# Patient Record
Sex: MALE | Race: OTHER | HISPANIC OR LATINO | Employment: FULL TIME | URBAN - METROPOLITAN AREA
[De-identification: names, ages, dates, MRNs, and addresses within clinical notes are randomized per-mention and may not be internally consistent; named-entity substitution may affect disease eponyms.]

---

## 2017-01-23 ENCOUNTER — ALLSCRIPTS OFFICE VISIT (OUTPATIENT)
Dept: OTHER | Facility: OTHER | Age: 52
End: 2017-01-23

## 2017-07-14 ENCOUNTER — ALLSCRIPTS OFFICE VISIT (OUTPATIENT)
Dept: OTHER | Facility: OTHER | Age: 52
End: 2017-07-14

## 2017-07-14 DIAGNOSIS — Z12.5 ENCOUNTER FOR SCREENING FOR MALIGNANT NEOPLASM OF PROSTATE: ICD-10-CM

## 2017-07-14 DIAGNOSIS — R73.09 OTHER ABNORMAL GLUCOSE: ICD-10-CM

## 2017-07-14 DIAGNOSIS — L82.1 OTHER SEBORRHEIC KERATOSIS: ICD-10-CM

## 2017-07-14 DIAGNOSIS — R06.83 SNORING: ICD-10-CM

## 2017-07-14 DIAGNOSIS — E78.5 HYPERLIPIDEMIA: ICD-10-CM

## 2017-07-14 DIAGNOSIS — E03.9 HYPOTHYROIDISM: ICD-10-CM

## 2017-07-14 DIAGNOSIS — Z00.00 ENCOUNTER FOR GENERAL ADULT MEDICAL EXAMINATION WITHOUT ABNORMAL FINDINGS: ICD-10-CM

## 2017-07-14 DIAGNOSIS — Z12.11 ENCOUNTER FOR SCREENING FOR MALIGNANT NEOPLASM OF COLON: ICD-10-CM

## 2017-07-25 ENCOUNTER — TRANSCRIBE ORDERS (OUTPATIENT)
Dept: ADMINISTRATIVE | Age: 52
End: 2017-07-25

## 2017-07-25 ENCOUNTER — APPOINTMENT (OUTPATIENT)
Dept: LAB | Age: 52
End: 2017-07-25
Payer: COMMERCIAL

## 2017-07-25 DIAGNOSIS — R06.83 SNORING: ICD-10-CM

## 2017-07-25 DIAGNOSIS — E78.5 HYPERLIPIDEMIA: ICD-10-CM

## 2017-07-25 DIAGNOSIS — R73.09 OTHER ABNORMAL GLUCOSE: ICD-10-CM

## 2017-07-25 DIAGNOSIS — Z12.5 ENCOUNTER FOR SCREENING FOR MALIGNANT NEOPLASM OF PROSTATE: ICD-10-CM

## 2017-07-25 DIAGNOSIS — E03.9 HYPOTHYROIDISM: ICD-10-CM

## 2017-07-25 DIAGNOSIS — Z12.11 ENCOUNTER FOR SCREENING FOR MALIGNANT NEOPLASM OF COLON: ICD-10-CM

## 2017-07-25 DIAGNOSIS — Z00.00 ENCOUNTER FOR GENERAL ADULT MEDICAL EXAMINATION WITHOUT ABNORMAL FINDINGS: ICD-10-CM

## 2017-07-25 DIAGNOSIS — L82.1 OTHER SEBORRHEIC KERATOSIS: ICD-10-CM

## 2017-07-25 LAB
ALBUMIN SERPL BCP-MCNC: 3.9 G/DL (ref 3.5–5)
ALP SERPL-CCNC: 60 U/L (ref 46–116)
ALT SERPL W P-5'-P-CCNC: 79 U/L (ref 12–78)
ANION GAP SERPL CALCULATED.3IONS-SCNC: 5 MMOL/L (ref 4–13)
AST SERPL W P-5'-P-CCNC: 102 U/L (ref 5–45)
BASOPHILS # BLD AUTO: 0.02 THOUSANDS/ΜL (ref 0–0.1)
BASOPHILS NFR BLD AUTO: 0 % (ref 0–1)
BILIRUB SERPL-MCNC: 0.51 MG/DL (ref 0.2–1)
BUN SERPL-MCNC: 20 MG/DL (ref 5–25)
CALCIUM SERPL-MCNC: 9.2 MG/DL (ref 8.3–10.1)
CHLORIDE SERPL-SCNC: 104 MMOL/L (ref 100–108)
CHOLEST SERPL-MCNC: 210 MG/DL (ref 50–200)
CO2 SERPL-SCNC: 26 MMOL/L (ref 21–32)
CREAT SERPL-MCNC: 1.05 MG/DL (ref 0.6–1.3)
EOSINOPHIL # BLD AUTO: 0.09 THOUSAND/ΜL (ref 0–0.61)
EOSINOPHIL NFR BLD AUTO: 2 % (ref 0–6)
ERYTHROCYTE [DISTWIDTH] IN BLOOD BY AUTOMATED COUNT: 13.9 % (ref 11.6–15.1)
EST. AVERAGE GLUCOSE BLD GHB EST-MCNC: 123 MG/DL
GFR SERPL CREATININE-BSD FRML MDRD: 81 ML/MIN/1.73SQ M
GLUCOSE P FAST SERPL-MCNC: 121 MG/DL (ref 65–99)
HBA1C MFR BLD: 5.9 % (ref 4.2–6.3)
HCT VFR BLD AUTO: 44 % (ref 36.5–49.3)
HDLC SERPL-MCNC: 34 MG/DL (ref 40–60)
HGB BLD-MCNC: 15.3 G/DL (ref 12–17)
LDLC SERPL CALC-MCNC: 138 MG/DL (ref 0–100)
LYMPHOCYTES # BLD AUTO: 1.6 THOUSANDS/ΜL (ref 0.6–4.47)
LYMPHOCYTES NFR BLD AUTO: 33 % (ref 14–44)
MCH RBC QN AUTO: 30.8 PG (ref 26.8–34.3)
MCHC RBC AUTO-ENTMCNC: 34.8 G/DL (ref 31.4–37.4)
MCV RBC AUTO: 89 FL (ref 82–98)
MONOCYTES # BLD AUTO: 0.58 THOUSAND/ΜL (ref 0.17–1.22)
MONOCYTES NFR BLD AUTO: 12 % (ref 4–12)
NEUTROPHILS # BLD AUTO: 2.58 THOUSANDS/ΜL (ref 1.85–7.62)
NEUTS SEG NFR BLD AUTO: 53 % (ref 43–75)
NRBC BLD AUTO-RTO: 0 /100 WBCS
PLATELET # BLD AUTO: 294 THOUSANDS/UL (ref 149–390)
PMV BLD AUTO: 8.8 FL (ref 8.9–12.7)
POTASSIUM SERPL-SCNC: 4.8 MMOL/L (ref 3.5–5.3)
PROT SERPL-MCNC: 8.1 G/DL (ref 6.4–8.2)
RBC # BLD AUTO: 4.97 MILLION/UL (ref 3.88–5.62)
SODIUM SERPL-SCNC: 135 MMOL/L (ref 136–145)
TRIGL SERPL-MCNC: 191 MG/DL
WBC # BLD AUTO: 4.88 THOUSAND/UL (ref 4.31–10.16)

## 2017-07-25 PROCEDURE — 80061 LIPID PANEL: CPT

## 2017-07-25 PROCEDURE — 85025 COMPLETE CBC W/AUTO DIFF WBC: CPT

## 2017-07-25 PROCEDURE — 83036 HEMOGLOBIN GLYCOSYLATED A1C: CPT

## 2017-07-25 PROCEDURE — 80053 COMPREHEN METABOLIC PANEL: CPT

## 2017-07-25 PROCEDURE — 36415 COLL VENOUS BLD VENIPUNCTURE: CPT

## 2017-07-30 ENCOUNTER — GENERIC CONVERSION - ENCOUNTER (OUTPATIENT)
Dept: OTHER | Facility: OTHER | Age: 52
End: 2017-07-30

## 2017-08-31 ENCOUNTER — ALLSCRIPTS OFFICE VISIT (OUTPATIENT)
Dept: OTHER | Facility: OTHER | Age: 52
End: 2017-08-31

## 2017-08-31 DIAGNOSIS — L82.1 OTHER SEBORRHEIC KERATOSIS: ICD-10-CM

## 2017-08-31 PROCEDURE — 88305 TISSUE EXAM BY PATHOLOGIST: CPT | Performed by: FAMILY MEDICINE

## 2017-09-01 ENCOUNTER — LAB REQUISITION (OUTPATIENT)
Dept: LAB | Facility: HOSPITAL | Age: 52
End: 2017-09-01
Payer: COMMERCIAL

## 2017-09-01 DIAGNOSIS — L82.1 OTHER SEBORRHEIC KERATOSIS: ICD-10-CM

## 2017-09-10 ENCOUNTER — GENERIC CONVERSION - ENCOUNTER (OUTPATIENT)
Dept: OTHER | Facility: OTHER | Age: 52
End: 2017-09-10

## 2017-11-29 ENCOUNTER — TRANSCRIBE ORDERS (OUTPATIENT)
Dept: ADMINISTRATIVE | Facility: HOSPITAL | Age: 52
End: 2017-11-29

## 2017-11-29 DIAGNOSIS — G47.30 SLEEP APNEA IN ADULT: Primary | ICD-10-CM

## 2018-01-10 NOTE — MISCELLANEOUS
Message  Return to work or school:  04/25/2016   He is able to return to work on  04/26/2016       Dr Mercedes Goodman DO/ JIA  Signatures   Electronically signed by : Clara Clayton DO;  Apr 25 2016 10:38AM EST                       (Author)

## 2018-01-10 NOTE — PROGRESS NOTES
Chief Complaint  Patient presents to office for administration of an influenza vaccine  Active Problems    1  Anxiety disorder (300 00) (F41 9)   2  Colon cancer screening (V76 51) (Z12 11)   3  GERD (gastroesophageal reflux disease) (530 81) (K21 9)   4  Hyperlipidemia (272 4) (E78 5)   5  Hypothyroidism (244 9) (E03 9)   6  Insomnia (780 52) (G47 00)   7  Need for prophylactic vaccination and inoculation against influenza (V04 81) (Z23)   8  Need for vaccination for DTP (V06 1) (Z23)   9  Prediabetes (790 29) (R73 09)   10  Prostate cancer screening (V76 44) (Z12 5)   11  Snoring (786 09) (R06 83)   12  Umbilical hernia, recurrence not specified   13  Well adult on routine health check (V70 0) (Z00 00)    Current Meds   1  ALPRAZolam 0 25 MG Oral Tablet; 1 5 TABS QD; Therapy: 22LLH2294 to (Evaluate:75Atg7733); Last Rx:19Jan2017 Ordered   2  Aspirin 81 MG TABS; TAKE 1 TABLET DAILY; Therapy: 24DSM3067 to (Evaluate:31Owb2756) Recorded   3  Centrum Silver Oral Tablet; TAKE 1 TABLET DAILY; Therapy: 58GKW2360 to Recorded   4  Levothyroxine Sodium 50 MCG Oral Tablet; take 1 tablet by mouth once daily; Therapy: 89LPZ0751 to ((05) 2020-1054)  Requested for: 10AST7755; Last   Rx:03Nov2016 Ordered   5  Omeprazole 40 MG Oral Capsule Delayed Release; TAKE ONE CAPSULE BY MOUTH   ONCE DAILY; Therapy: 16Xck9862 to (Last Rx:19Jan2017)  Requested for: 34BSR2029 Ordered    Allergies    1  NexIUM 24HR CPDR    2  No Known Environmental Allergies   3   No Known Food Allergies    Plan  Need for prophylactic vaccination and inoculation against influenza    · Fluzone Quadrivalent 0 5 ML Intramuscular Suspension Prefilled Syringe    Future Appointments    Date/Time Provider Specialty Site   05/01/2017 10:00 AM Sarah Saldaña DO [facility] [address]   Electronically signed by : Janna Pierce DO; Jan 24 2017  7:43AM EST                       (Author)

## 2018-01-12 NOTE — PROGRESS NOTES
Assessment    1  Encounter for preventive health examination (V70 0) (Z00 00)   2  Colon cancer screening (V76 51) (Z12 11)   3  Prostate cancer screening (V76 44) (Z12 5)   4  Hyperlipidemia (272 4) (E78 5)   5  Hypothyroidism (244 9) (E03 9)   6  Abnormal glucose (790 29) (R73 09)   7  Snoring (786 09) (R06 83)   8  Seborrheic keratosis (702 19) (L82 1)    Plan  Abnormal glucose, Colon cancer screening, Health Maintenance, Hyperlipidemia,  Hypothyroidism, Prostate cancer screening, Seborrheic keratosis, Snoring    · (1) CBC/PLT/DIFF; Status:Active; Requested for:73Ncr0554;    · (1) COMPREHENSIVE METABOLIC PANEL; Status:Active; Requested for:27Wtu5057;    · (1) HEMOGLOBIN A1C; Status:Active; Requested for:48Dbj3439;    · (1) LIPID PANEL, FASTING; Status:Active; Requested for:11Pgy4340;    · COLONOSCOPY; Status:Active; Requested for:62Efd5629;    · 1 - Tin Horan MD (Gastroenterology) Co-Management  * 46 yo male with need of colon  cancer screening  Bladimir Jean-Baptiste DO  Status: Active  Requested for: [address] Summary provided  : Yes   · 2 - Teodora Lancaster MD  (Sleep Specialist) Co-Management  *  46 yo male with snoring  and daytime somnolence  Thanks  Bladimir Jean-Baptiste DO  Status: Hold For - Scheduling   Requested for: [address] Summary provided  : Yes   · Follow-up visit in 1 month Evaluation and Treatment  Follow-up  Status: Hold For -  Scheduling  Requested for: 58GEB9553  Hypothyroidism    · (1) TSH WITH FT4 REFLEX; Status:Active; Requested for:16Dwn9410;     Discussion/Summary  Impression: health maintenance visit, healthy adult male  Currently, he eats a healthy diet and has an adequate exercise regimen  Prostate cancer screening: the risks and benefits of prostate cancer screening were discussed, prostate cancer screening is current and PSA was ordered   Testicular cancer screening: the risks and benefits of testicular cancer screening were discussed, self testicular exam technique was taught, monthly self testicular exam was advised, testicular cancer screening is current and clinical testicular exam was done today  Colorectal cancer screening: the risks and benefits of colorectal cancer screening were discussed, colonoscopy has been ordered and Pt referred to Dr Ciara Rosario of GI  Screening lab work includes glucose, lipid profile and A1c, CBC, PSA, and TSH  The risks and benefits of immunizations were discussed and immunizations are up to date  He was advised to be evaluated by Pt was referred to Dr Alejandra Parrish of Sleep Medicine - likely will need sleep study  Advice and education were given regarding nutrition, aerobic exercise, tobacco cessation and We discussed the spot on the left anterior tibial region - likely a benign SK; given though that he just recently noticed it, and that it is not round, biopsy is indicated  Patient discussion: discussed with the patient, Valeriy Roche is healthy on exam  He is to f/u in 1 month for punch biopsy of the spot on the left leg, or sooner PRN  Chief Complaint  PT is being seen today for a  visit  PT would also like to have a script for sleep study, colonoscopy and bw for [employer]  History of Present Illness  HM, Adult Male: The patient is being seen for a health maintenance evaluation  The last health maintenance visit was 1 year(s) ago  General Health: The patient's health since the last visit is described as good   Needs coloscopy, BW, and concerns over snoring  Vaelriy Roche is prior [employer], with also over 36 year practicing [employer]  He has regular dental visits  He denies vision problems  He denies hearing loss  Immunizations status: up to date  Lifestyle:  He consumes a diverse and healthy diet  He does not have any weight concerns  He exercises regularly  Exercise includes running/jogging and strength training  He uses tobacco  The patient has never smoked cigarettes, is a current cigar smoker and Occ cigar  He consumes alcohol   He reports occasional alcohol use  He denies drug use  Reproductive health:  the patient is sexually active  He denies erectile dysfunction  Screening: cancer screening reviewed and current  metabolic screening reviewed and current  risk screening reviewed and current  Additional History:  +Snoring - significant; +/-Daytime somnolence  No CP/SOB/GIRALDO  No abd pain or rectal bleeding  Urine flow is fine  No ED  No anxiety or depression  HPI: As above  Review of Systems    Constitutional: as noted in HPI  Cardiovascular: as noted in HPI  Respiratory: as noted in HPI  Gastrointestinal: as noted in HPI  Genitourinary: as noted in HPI  Psychiatric: as noted in HPI  Active Problems    1  Abnormal glucose (790 29) (R73 09)   2  Anxiety disorder (300 00) (F41 9)   3  Colon cancer screening (V76 51) (Z12 11)   4  GERD (gastroesophageal reflux disease) (530 81) (K21 9)   5  Hyperlipidemia (272 4) (E78 5)   6  Hypothyroidism (244 9) (E03 9)   7  Insomnia (780 52) (G47 00)   8  Need for prophylactic vaccination and inoculation against influenza (V04 81) (Z23)   9  Need for vaccination for DTP (V06 1) (Z23)   10  Prostate cancer screening (V76 44) (Z12 5)   11  Snoring (786 09) (R06 83)   12  Umbilical hernia, recurrence not specified   13   Well adult on routine health check (V70 0) (Z00 00)    Past Medical History    · Need for prophylactic vaccination and inoculation against influenza (V04 81) (Z23)   · Need for vaccination for DTP (V06 1) (Z23)   · History of Otalgia, unspecified laterality (388 70) (H92 09)   · History of Pulmonary Embolism (V12 51)    Surgical History    · History of Tonsillectomy With Adenoidectomy    Family History  Mother    · Family history of Diabetes Mellitus (V18 0)   · Family history of hypothyroidism (V18 19) (Z83 49)   · Family history of Glaucoma   · Family history of Hypertension (V17 49)  Sister    · Family history of Alcohol Abuse   · Family history of Drug Dependence   · Family history of hypothyroidism (V18 19) (Z83 49)  Maternal Grandmother    · Family history of Lung Cancer (V16 1)  Family History    · Family history of Family Health Status 5  Children Living    Social History    · Being A Social Drinker   · OCC   · Caffeine Use   · 1 CUP PER DAY   · Former smoker (V15 82) (G21 027)   · QUIT 2007   · Marital History - Currently    · 3 children ages 11-16   · Occupation:   ·  IN A [employer]   · Recreational Activities [employer]ial Arts    Current Meds   1  ALPRAZolam 0 25 MG Oral Tablet; 1 5 TABS QD; Therapy: 87IBI4256 to (Evaluate:10Llt5985); Last Rx:19Jan2017 Ordered   2  Aspirin 81 MG TABS; TAKE 1 TABLET DAILY; Therapy: 52SHY2932 to (Evaluate:76Wbx5256) Recorded   3  Centrum Silver Oral Tablet; TAKE 1 TABLET DAILY; Therapy: 55TAR6730 to Recorded   4  Levothyroxine Sodium 50 MCG Oral Tablet; take 1 tablet by mouth once daily; Therapy: 56MDV8803 to ()  Requested for: 08JOZ8052; Last   Rx:03Nov2016 Ordered   5  Omeprazole 40 MG Oral Capsule Delayed Release; TAKE ONE CAPSULE BY MOUTH   ONCE DAILY; Therapy: 19Gib4518 to (Last Rx:19Jan2017)  Requested for: 24OFT8459 Ordered    Allergies    1  NexIUM 24HR CPDR    2  No Known Environmental Allergies   3  No Known Food Allergies    Vitals   Recorded: 21Ybl9315 08:41AM   Heart Rate 60   Respiration 16   Systolic 580   Diastolic 82   Height 5 ft 10 in   Weight 233 lb 4 oz   BMI Calculated 33 47   BSA Calculated 2 23     Physical Exam    Constitutional   General appearance: No acute distress, well appearing and well nourished  NAD; VSS; pleasant  Head and Face   Head and face: Normal     Eyes   Conjunctiva and lids: No erythema, swelling or discharge  Ears, Nose, Mouth, and Throat   External inspection of ears and nose: Normal     Otoscopic examination: Tympanic membranes translucent with normal light reflex  Canals patent without erythema      Hearing: Normal     Lips, teeth, and gums: Normal, good dentition  Oropharynx: Normal with no erythema, edema, exudate or lesions  Neck   Neck: Supple, symmetric, trachea midline, no masses  Pulmonary   Respiratory effort: No increased work of breathing or signs of respiratory distress  Auscultation of lungs: Clear to auscultation  Cardiovascular   Auscultation of heart: Normal rate and rhythm, normal S1 and S2, no murmurs  Examination of extremities for edema and/or varicosities: Normal     Abdomen   Abdomen: Non-tender, no masses  Liver and spleen: No hepatomegaly or splenomegaly  Examination for hernias: No hernias appreciated  Anus, perineum, and rectum: Normal sphincter tone, no masses, no prolapse  Stool sample for occult blood: Negative  The stool showed no gross blood  Genitourinary   Scrotal contents: Normal testes, no masses  Penis: Normal, no lesions  Digital rectal exam of prostate: Normal size, no masses  Lymphatic   Palpation of lymph nodes in neck: No lymphadenopathy  Musculoskeletal   Gait and station: Normal     Skin   Skin and subcutaneous tissue: Normal without rashes or lesions  Pt with a small - 0 75cm - raised slightly, dry, heterogeneously pigmented, almost triangular lesion to the mid-left anterior tibial region  Has more of a "stuck on" appearance - most c/w a benign seborrheic keratosis  Psychiatric   Judgment and insight: Normal     Orientation to person, place and time: Normal     Recent and remote memory: Intact  Mood and affect: Normal        Results/Data  PHQ-2 Adult Depression Screening 92GFT3753 08:44AM User, s     Test Name Result Flag Reference   PHQ-2 Adult Depression Score 0     Over the last two weeks, how often have you been bothered by any of the following problems?   Little interest or pleasure in doing things: Not at all - 0  Feeling down, depressed, or hopeless: Not at all - 0   PHQ-2 Adult Depression Screening Negative         Signatures Electronically signed by : Lauren Honeycutt DO; Jul 14 2017  9:24AM EST                       (Author)

## 2018-01-12 NOTE — RESULT NOTES
Verified Results  (1) TISSUE EXAM 89TNA9863 04:02PM Natividad Garcia     Test Name Result Flag Reference   LAB AP CASE REPORT (Report)     Surgical Pathology Report             Case: [id number]                   Authorizing Provider: Lala Martinez DO    Collected:      08/31/2017           Pathologist:      Isis Lo MD        Received:      09/02/2017 0913        Specimen:  Skin, Other, Left mid-anterior tibial region   LAB AP FINAL DIAGNOSIS (Report)     A  Skin, Left mid-anterior tibial region, punch biopsy:  - Consistent with pigmented keratosis; see note  Note: Sections show hyperpigmented basal keratinocytes  Interpretation performed at [facility], [address]        Electronically signed by Isis Lo MD on 9/8/2017 at 4:02 PM   LAB AP SURGICAL ADDITIONAL INFORMATION (Report)     All controls performed with the immunohistochemical stains reported above   reacted appropriately  These tests were developed and their performance   characteristics determined by [facility] or   [facility]  They may not be cleared or approved by the [place]  Food and Drug Administration  The FDA has determined that such clearance   or approval is not necessary  These tests are used for clinical purposes  They should not be regarded as investigational or for research  This   laboratory has been approved by [facility], designated as a high-complexity   laboratory and is qualified to perform these tests  LAB AP GROSS DESCRIPTION (Report)     A  The specimen is received in formalin, labeled with the patient's name   and hospital number, and is designated skin punch biopsy left mid   anterior tibial lesion  The specimen consists of a tan portion of skin   measuring 0 2 cm in diameter, excised to a depth of 0 1 cm  The skin   surface appears somewhat keratotic  The margin of resection is inked   green  The skin surface is inked red   Entirely submitted  One cassette  Note: The estimated total formalin fixation time based upon information   provided by the submitting clinician and the standard processing schedule   is over 72 hours       MAC

## 2018-01-13 VITALS
HEART RATE: 60 BPM | BODY MASS INDEX: 34.07 KG/M2 | RESPIRATION RATE: 16 BRPM | DIASTOLIC BLOOD PRESSURE: 86 MMHG | WEIGHT: 238 LBS | SYSTOLIC BLOOD PRESSURE: 120 MMHG | HEIGHT: 70 IN

## 2018-01-13 NOTE — RESULT NOTES
Verified Results  (1) CBC/PLT/DIFF 63BZK1280 06:40AM Asad Garcia     Test Name Result Flag Reference   WBC COUNT 4 88 Thousand/uL  4 31-10 16   RBC COUNT 4 97 Million/uL  3 88-5 62   HEMOGLOBIN 15 3 g/dL  12 0-17 0   HEMATOCRIT 44 0 %  36 5-49 3   MCV 89 fL  82-98   MCH 30 8 pg  26 8-34 3   MCHC 34 8 g/dL  31 4-37 4   RDW 13 9 %  11 6-15 1   MPV 8 8 fL L 8 9-12 7   PLATELET COUNT 134 Thousands/uL  149-390   nRBC AUTOMATED 0 /100 WBCs     NEUTROPHILS RELATIVE PERCENT 53 %  43-75   LYMPHOCYTES RELATIVE PERCENT 33 %  14-44   MONOCYTES RELATIVE PERCENT 12 %  4-12   EOSINOPHILS RELATIVE PERCENT 2 %  0-6   BASOPHILS RELATIVE PERCENT 0 %  0-1   NEUTROPHILS ABSOLUTE COUNT 2 58 Thousands/? ??L  1 85-7 62   LYMPHOCYTES ABSOLUTE COUNT 1 60 Thousands/? ??L  0 60-4 47   MONOCYTES ABSOLUTE COUNT 0 58 Thousand/? ??L  0 17-1 22   EOSINOPHILS ABSOLUTE COUNT 0 09 Thousand/? ??L  0 00-0 61   BASOPHILS ABSOLUTE COUNT 0 02 Thousands/? ??L  0 00-0 10     (1) COMPREHENSIVE METABOLIC PANEL 26EDK6081 24:33LS Asad Garcia     Test Name Result Flag Reference   SODIUM 135 mmol/L L 136-145   POTASSIUM 4 8 mmol/L  3 5-5 3   CHLORIDE 104 mmol/L  100-108   CARBON DIOXIDE 26 mmol/L  21-32   ANION GAP (CALC) 5 mmol/L  4-13   BLOOD UREA NITROGEN 20 mg/dL  5-25   CREATININE 1 05 mg/dL  0 60-1 30   Standardized to IDMS reference method   CALCIUM 9 2 mg/dL  8 3-10 1   BILI, TOTAL 0 51 mg/dL  0 20-1 00   ALK PHOSPHATAS 60 U/L     ALT (SGPT) 79 U/L H 12-78   AST(SGOT) 102 U/L H 5-45   ALBUMIN 3 9 g/dL  3 5-5 0   TOTAL PROTEIN 8 1 g/dL  6 4-8 2   eGFR 81 ml/min/1 73sq m     National Kidney Disease Education Program recommendations are as follows:  GFR calculation is accurate only with a steady state creatinine  Chronic Kidney disease less than 60 ml/min/1 73 sq  meters  Kidney failure less than 15 ml/min/1 73 sq  meters     GLUCOSE FASTING 121 mg/dL H 65-99     (1) HEMOGLOBIN A1C 30DNZ2076 06:40AM Asad Garcia     Test Name Result Flag Reference   HEMOGLOBIN A1C 5 9 %  4 2-6 3   EST  AVG  GLUCOSE 123 mg/dl       (1) LIPID PANEL, FASTING 82MTE0626 06:40AM Asad Garcia     Test Name Result Flag Reference   CHOLESTEROL 210 mg/dL H    HDL,DIRECT 34 mg/dL L 40-60   Specimen collection should occur prior to Metamizole administration due to the potential for falsely depressed results  LDL CHOLESTEROL CALCULATED 138 mg/dL H 0-100   Triglyceride:         Normal              <150 mg/dl       Borderline High    150-199 mg/dl       High               200-499 mg/dl       Very High          >499 mg/dl  Cholesterol:         Desirable        <200 mg/dl      Borderline High  200-239 mg/dl      High             >239 mg/dl  HDL Cholesterol:        High    >59 mg/dL      Low     <41 mg/dL  LDL CALCULATED:    This screening LDL is a calculated result  It does not have the accuracy of the Direct Measured LDL in the monitoring of patients with hyperlipidemia and/or statin therapy  Direct Measure LDL (QAQ076) must be ordered separately in these patients  TRIGLYCERIDES 191 mg/dL H <=150   Specimen collection should occur prior to N-Acetylcysteine or Metamizole administration due to the potential for falsely depressed results

## 2018-01-13 NOTE — RESULT NOTES
Verified Results  (1) HEMOGLOBIN A1C 02Jun2016 07:01AM [email or web address] Order Number: [id number]  TW Order Number: [id number]     Test Name Result Flag Reference   HEMOGLOBIN A1C 6 0 %  4 2-6 3   EST  AVG  GLUCOSE 126 mg/dl       (1) LIPID PANEL, FASTING 02Jun2016 07:01AM [email or web address] Order Number: [id number]  TW Order Number: [id number] Order Number: [id number] Order Number: [id number]     Test Name Result Flag Reference   CHOLESTEROL 202 mg/dL H    HDL,DIRECT 29 mg/dL L 40-60   Specimen collection should occur prior to Metamizole administration due to the potential for falsely depressed results  LDL CHOLESTEROL CALCULATED 109 mg/dL H 0-100   Triglyceride:         Normal              <150 mg/dl       Borderline High    150-199 mg/dl       High               200-499 mg/dl       Very High          >499 mg/dl  Cholesterol:         Desirable        <200 mg/dl      Borderline High  200-239 mg/dl      High             >239 mg/dl  HDL Cholesterol:        High    >59 mg/dL      Low     <41 mg/dL  LDL CALCULATED:    This screening LDL is a calculated result  It does not have the accuracy of the Direct Measured LDL in the monitoring of patients with hyperlipidemia and/or statin therapy  Direct Measure LDL (OTH973) must be ordered separately in these patients  TRIGLYCERIDES 321 mg/dL H <=150   Specimen collection should occur prior to N-Acetylcysteine or Metamizole administration due to the potential for falsely depressed results  (1) COMPREHENSIVE METABOLIC PANEL 63RGC9926 96:03TZ [email or web address] Order Number: [id number]  TW Order Number: [id number] Order Number: [id number] Order Number: [id number]     Test Name Result Flag Reference   GLUCOSE,RANDM 108 mg/dL     If the patient is fasting, the ADA then defines impaired fasting glucose as > 100 mg/dL and diabetes as > or equal to 123 mg/dL     SODIUM 138 mmol/L  136-145   POTASSIUM 3 9 mmol/L  3 5-5 3   CHLORIDE 104 mmol/L  100-108   CARBON DIOXIDE 28 mmol/L  21-32   ANION GAP (CALC) 6 mmol/L  4-13   BLOOD UREA NITROGEN 23 mg/dL  5-25   CREATININE 0 90 mg/dL  0 60-1 30   Standardized to IDMS reference method   CALCIUM 8 3 mg/dL  8 3-10 1   BILI, TOTAL 0 50 mg/dL  0 20-1 00   ALK PHOSPHATAS 63 U/L     ALT (SGPT) 39 U/L  12-78   AST(SGOT) 15 U/L  5-45   ALBUMIN 3 8 g/dL  3 5-5 0   TOTAL PROTEIN 7 5 g/dL  6 4-8 2   eGFR Non-African American      >60 0 ml/min/1 73sq [facility] Disease Education Program recommendations are as follows:  GFR calculation is accurate only with a steady state creatinine  Chronic Kidney disease less than 60 ml/min/1 73 sq  meters  Kidney failure less than 15 ml/min/1 73 sq  meters  (1) TSH WITH FT4 REFLEX 02Jun2016 07:01AM [employer] Order Number: [id number]  TW Order Number: [id number]     Test Name Result Flag Reference   TSH 13 600 uIU/mL H 0 358-3 740   T4,FREE 0 73 ng/dL L 0 76-1 46     (1) PSA (SCREEN) (Dx V76 44 Screen for Prostate Cancer) 02Jun2016 07:01AM [employer] Order Number: [id number]  TW Order Number: [id number]     Test Name Result Flag Reference   PROSTATE SPECIFIC ANTIGEN 1 2 ng/mL  0 0-4 0       Discussion/Summary   THYROID LEVEL IS ABNORMAL   CHOLESTEROL IS HIGHER    [facility]

## 2018-01-14 VITALS
DIASTOLIC BLOOD PRESSURE: 82 MMHG | RESPIRATION RATE: 16 BRPM | SYSTOLIC BLOOD PRESSURE: 118 MMHG | WEIGHT: 233.25 LBS | HEART RATE: 60 BPM | HEIGHT: 70 IN | BODY MASS INDEX: 33.39 KG/M2

## 2018-01-15 NOTE — RESULT NOTES
Verified Results  (1) TSH WITH FT4 REFLEX 20Oct2016 07:20AM Stacy Yadira    Order Number: [id number]     Test Name Result Flag Reference   TSH 6 130 uIU/mL H 0 358-3 740   Patients undergoing fluorescein dye angiography may retain small amounts of fluorescein in the body for 48-72 hours post procedure  Samples containing fluorescein can produce falsely depressed TSH values  If the patient had this procedure,a specimen should be resubmitted post fluorescein clearance  T4,FREE 0 95 ng/dL  0 76-1 46       Plan  Hypothyroidism    · From  Levothyroxine Sodium 25 MCG Oral Tablet TAKE 1 TABLET DAILY To  Levothyroxine Sodium 50 MCG Oral Tablet take 1 tablet by mouth once daily   · (1) TSH WITH FT4 REFLEX; Status:Active;  Requested for:35Mih6913;

## 2018-01-15 NOTE — PROGRESS NOTES
Assessment    1  Well adult on routine health check (V70 0) (Z00 00)   2  Prostate cancer screening (V76 44) (Z12 5)   3  Snoring (786 09) (R06 83)   4  Colon cancer screening (V76 51) (Z12 11)    Plan  Anxiety disorder    · ALPRAZolam 0 25 MG Oral Tablet (Xanax); 1 5 TABS QD  Colon cancer screening    · COLONOSCOPY; Status:Active; Requested for:77Bot6821;    · 2 - Kalia Nguyen MD, [facility]  Colorectal Surgery, Gastroenterology Physician Referral   Consult  Status: Hold For - Scheduling  Requested for: 16Kmr0214  Care Summary provided  : Yes  Hyperlipidemia, Prediabetes, Prostate cancer screening    · (1) COMPREHENSIVE METABOLIC PANEL; Status:Active; Requested for:02Lbg0785;    · (1) HEMOGLOBIN A1C; Status:Active; Requested for:04Syi7379;    · (1) LIPID PANEL, FASTING; Status:Active; Requested for:73Jlo7172;    · (1) TSH WITH FT4 REFLEX; Status:Active; Requested for:21Zbh6632;   Prostate cancer screening    · (1) PSA (SCREEN) (Dx V76 44 Screen for Prostate Cancer); Status:Active; Requested  for:15Vti9234;   Snoring    · *Polysomnography, Sleep Study, Diagnostic; Status:Need Information - Financial  Authorization; Requested for:[id number];   there any other medical conditions or medications that would explain these      symptoms? : No  is the sleep disturbance affecting the patient's ability to function? : NO  History/Symptoms: : SNORING, WITNESSED APNEW  Study Only or Consult : Sleep Study and Consult and F/U with Sleep Specialist    Discussion/Summary  Impression: health maintenance visit  Currently, he eats a healthy diet and has an adequate exercise regimen  Prostate cancer screening: PSA was ordered  Testicular cancer screening: monthly self testicular exam was advised  Colorectal cancer screening: colonoscopy has been ordered  The immunizations are up to date  Advice and education were given regarding aerobic exercise and vitamin D supplements  Patient discussion: discussed with the patient       WIFE VIDEO TAPED SLEEPING- PERIODS WHERE BREATHING STOPS AND LOUD SNORING  WILL ORDER SLEEP STUDY  Chief Complaint  Patient here for Annual Wellness exam      History of Present Illness  HM, Adult Male: The patient is being seen for a health maintenance evaluation  General Health: The patient's health since the last visit is described as good  He has regular dental visits  He denies vision problems  He denies hearing loss  Immunizations status: up to date  Lifestyle:  He consumes a diverse and healthy diet  He does not have any weight concerns  He exercises regularly  He does not use tobacco  He denies alcohol use  He denies drug use  Screening: cancer screening reviewed and updated  Prostate cancer screening includes prostate-specific antigen testing last year  Testicular cancer screening includes monthly self testicular examinations  Colorectal cancer screening includes no previous screening  metabolic screening reviewed and updated  Metabolic screening includes lipid profile performed within the past five years, glucose screening performed last year and thyroid function test performed last year  Review of Systems    Constitutional: No fever or chills, feels well, no tiredness, no recent weight gain or weight loss  Eyes: No complaints of eye pain, no red eyes, no discharge from eyes, no itchy eyes  ENT: no complaints of earache, no hearing loss, no nosebleeds, no nasal discharge, no sore throat, no hoarseness  Cardiovascular: No complaints of slow heart rate, no fast heart rate, no chest pain, no palpitations, no leg claudication, no lower extremity  Respiratory: No complaints of shortness of breath, no wheezing, no cough, no SOB on exertion, no orthopnea or PND  Gastrointestinal: No complaints of abdominal pain, no constipation, no nausea or vomiting, no diarrhea or bloody stools     Genitourinary: No complaints of dysuria, no incontinence, no hesitancy, no nocturia, no genital lesion, no testicular pain    Musculoskeletal: No complaints of arthralgia, no myalgias, no joint swelling or stiffness, no limb pain or swelling  Integumentary: No complaints of skin rash or skin lesions, no itching, no skin wound, no dry skin  Neurological: No compliants of headache, no confusion, no convulsions, no numbness or tingling, no dizziness or fainting, no limb weakness, no difficulty walking  Psychiatric: Is not suicidal, no sleep disturbances, no anxiety or depression, no change in personality, no emotional problems  Endocrine: No complaints of proptosis, no hot flashes, no muscle weakness, no erectile dysfunction, no deepening of the voice, no feelings of weakness  Hematologic/Lymphatic: No complaints of swollen glands, no swollen glands in the neck, does not bleed easily, no easy bruising  Active Problems    1  Anxiety disorder (300 00) (F41 9)   2  GERD (gastroesophageal reflux disease) (530 81) (K21 9)   3  Hyperlipidemia (272 4) (E78 5)   4  Insomnia (780 52) (G47 00)   5  Need for prophylactic vaccination and inoculation against influenza (V04 81) (Z23)   6  Need for vaccination for DTP (V06 1) (Z23)   7  Prediabetes (790 29) (R73 09)   8   Prostate cancer screening (V76 44) (Z12 5)    Past Medical History    · Need for prophylactic vaccination and inoculation against influenza (V04 81) (Z23)   · Need for vaccination for DTP (V06 1) (Z23)   · History of Otalgia, unspecified laterality (388 70) (H92 09)   · History of Pulmonary Embolism (V12 51)    Surgical History    · History of Tonsillectomy With Adenoidectomy    Family History  Mother    · Family history of Diabetes Mellitus (V18 0)   · Family history of Glaucoma   · Family history of Hypertension (V17 49)  Sister    · Family history of Alcohol Abuse   · Family history of Drug Dependence  Maternal Grandmother    · Family history of Lung Cancer (V16 1)  Family History    · Family history of Family Health Status 5  Children Living    Social History    · Being A Social Drinker   · OCC   · Caffeine Use   · 1 CUP PER DAY   · Former smoker (V15 82) (M66 675)   · QUIT 2007   · Marital History - Currently    · 3 children ages 11-16   · Occupation:   ·  IN A [employer]   · Recreational Activities Martial Arts    Current Meds   1  ALPRAZolam 0 25 MG Oral Tablet; 1 5 TABS QD; Therapy: 81IPK2450 to (Evaluate:01Apr2016); Last Rx:76Htw8620 Ordered   2  Aspirin 81 MG TABS; TAKE 1 TABLET DAILY; Therapy: 95RQY9009 to (Evaluate:62Fdx6522) Recorded   3  Centrum Silver Oral Tablet; TAKE 1 TABLET DAILY; Therapy: 50FLK2260 to Recorded   4  Omeprazole 40 MG Oral Capsule Delayed Release; TAKE ONE CAPSULE BY MOUTH   ONCE DAILY; Therapy: 50Sxr6436 to (Evaluate:24Eig4710)  Requested for: 12Jan2016; Last   Rx:12Jan2016 Ordered    Allergies    1  NexIUM 24HR CPDR    2  No Known Environmental Allergies   3  No Known Food Allergies    Vitals   Recorded: 25Apr2016 10:00AM   Heart Rate 74   Respiration 20   Systolic 453   Diastolic 72   Height 5 ft 10 2 in   Weight 231 lb 2 oz   BMI Calculated 32 97   BSA Calculated 2 22     Physical Exam    Constitutional   General appearance: No acute distress, well appearing and well nourished  Head and Face   Head and face: Normal     Eyes   Conjunctiva and lids: No erythema, swelling or discharge  Pupils and irises: Equal, round, reactive to light  Ears, Nose, Mouth, and Throat   External inspection of ears and nose: Normal     Otoscopic examination: Tympanic membranes translucent with normal light reflex  Canals patent without erythema  Hearing: Normal     Nasal mucosa, septum, and turbinates: Normal without edema or erythema  Lips, teeth, and gums: Normal, good dentition  Oropharynx: Normal with no erythema, edema, exudate or lesions  Neck   Neck: Supple, symmetric, trachea midline, no masses  Thyroid: Normal, no thyromegaly      Pulmonary   Respiratory effort: No increased work of breathing or signs of respiratory distress  Auscultation of lungs: Clear to auscultation  Cardiovascular   Auscultation of heart: Normal rate and rhythm, normal S1 and S2, no murmurs  Examination of extremities for edema and/or varicosities: Normal     Abdomen   Abdomen: Non-tender, no masses  Liver and spleen: No hepatomegaly or splenomegaly  Lymphatic   Palpation of lymph nodes in neck: No lymphadenopathy  Palpation of lymph nodes in axillae: No lymphadenopathy  Musculoskeletal   Gait and station: Normal     Inspection/palpation of digits and nails: Normal without clubbing or cyanosis  Inspection/palpation of joints, bones, and muscles: Normal     Range of motion: Normal     Stability: Normal     Muscle strength/tone: Normal     Skin   Skin and subcutaneous tissue: Normal without rashes or lesions  Palpation of skin and subcutaneous tissue: Normal turgor  Neurologic   Cranial nerves: Cranial nerves 2-12 intact  Cortical function: Normal mental status  Reflexes: 2+ and symmetric  Sensation: No sensory loss  Coordination: Normal finger to nose and heel to shin  Psychiatric   Judgment and insight: Normal     Orientation to person, place and time: Normal     Recent and remote memory: Intact  Mood and affect: Normal        Signatures   Electronically signed by : Dannielle Guzman DO;  Apr 25 2016 10:19AM EST                       (Author)

## 2018-01-17 NOTE — MISCELLANEOUS
Message  Return to work or school:   Oly Angeles is under my professional care  He was seen in my office on 07/14/2017   He is able to return to work on  07/17/2017       DR RUTHANN REDDY/RAINA        Signatures   Electronically signed by : Dom Ramos, ; Jul 14 2017  9:26AM EST                       (Author)

## 2018-01-18 ENCOUNTER — HOSPITAL ENCOUNTER (OUTPATIENT)
Dept: SLEEP CENTER | Facility: CLINIC | Age: 53
Discharge: HOME | End: 2018-01-18
Payer: COMMERCIAL

## 2018-01-18 ENCOUNTER — TRANSCRIBE ORDERS (OUTPATIENT)
Dept: SLEEP CENTER | Facility: CLINIC | Age: 53
End: 2018-01-18

## 2018-01-18 DIAGNOSIS — G47.30 SLEEP APNEA IN ADULT: ICD-10-CM

## 2018-01-18 DIAGNOSIS — G47.33 OSA (OBSTRUCTIVE SLEEP APNEA): Primary | ICD-10-CM

## 2018-01-18 NOTE — PROGRESS NOTES
Consultation - [address] [facility] [address] : 1965  MRN: [id number]      Assessment:  The patient's snoring and witnessed apneas are consistent with obstructive sleep apnea, despite his denying any significant daytime sleepiness  He has no history of cardiovascular disease  Plan:  I will obtain a home sleep test   If there is not a significant degree of obstructive sleep apnea, no further evaluation is required  If there is significant CRICKET, I will initiate APAP  Follow up: After testing    History of Present Illness:  47 y o male with a longstanding history of loud snoring and witnessed apneas according to his wife  His only past medical history is pulmonary embolism, which was of unclear cause  He denies any hypertension or other cardiovascular disease  He denies daytime sleepiness, but is too active to fall asleep  On occasion, when he does watch television, he will nod off  Review of Systems:  Headache, bruxism, lower extremity discomfort, anxiety  The patient denies nocturnal GERD, nocturia, nocturnal eating, abnormal behavior during sleep  Historical Information    Past Medical History:  Pulmonary emboli, GERD    Past Surgical History: Tonsillectomy (childhood)    Social History  History   Alcohol use: Not on file     History   Drug Use Not on file     History   Smoking Status    Not on file   Smokeless Tobacco    Not on file     Family History: non-contributory     Sleep History: See above     Sleep Schedule: The patient gets up at 3:00 a m  to go to work and sleeps in until 6:00 a m  on weekends  He averages 7 hours per night       Snoring/Apnea:  Snoring but no apnea    Medications/Allergies:  See chart    Objective:    Vital Signs:   See Chart    [place] Sleepiness Scale:  7    Physical Exam:    General: Alert, appropriate, cooperative, overweight    Head: NC/AT, mild retrognathia    Nose: No septal deviation, nares partially obstructed, mucosa normal    Throat: Airway lumen narrowed, tongue base thickened, no tonsils visualized    Neck: Normal, no thyromegaly or lymphadenopathy, no JVD    Heart: RR, normal S1 and S2, no murmurs    Chest: Clear bilaterally    Extremity: No clubbing, cyanosis, no edema    Skin: Warm, dry    Neuro: No motor abnormalities, cranial nerves appear intact        Counseling / Coordination of Care  Total clinic time spent today 25 minutes  Greater than 50% of total time was spent with the patient and / or family counseling and / or coordination of care  A description of the counseling / coordination of care: We discussed the pathophysiology of obstructive sleep apnea as well as the possible treatment options  We also discussed the rationale for positive airway pressure therapy  JILL Weathers    Board Certified Sleep Specialist

## 2018-01-19 ENCOUNTER — HOSPITAL ENCOUNTER (OUTPATIENT)
Dept: SLEEP CENTER | Facility: CLINIC | Age: 53
Discharge: HOME | End: 2018-01-19
Payer: COMMERCIAL

## 2018-01-19 DIAGNOSIS — G47.33 OSA (OBSTRUCTIVE SLEEP APNEA): ICD-10-CM

## 2018-01-19 PROCEDURE — G0399 HOME SLEEP TEST/TYPE 3 PORTA: HCPCS

## 2018-01-26 ENCOUNTER — TELEPHONE (OUTPATIENT)
Dept: FAMILY MEDICINE CLINIC | Facility: CLINIC | Age: 53
End: 2018-01-26

## 2018-01-26 DIAGNOSIS — E03.9 HYPOTHYROIDISM, UNSPECIFIED TYPE: Primary | ICD-10-CM

## 2018-01-26 RX ORDER — LEVOTHYROXINE SODIUM 0.05 MG/1
50 TABLET ORAL DAILY
Qty: 90 TABLET | Refills: 3 | Status: SHIPPED | OUTPATIENT
Start: 2018-01-26 | End: 2018-06-11 | Stop reason: SDUPTHER

## 2018-01-26 RX ORDER — LEVOTHYROXINE SODIUM 0.05 MG/1
1 TABLET ORAL DAILY
COMMUNITY
Start: 2016-07-07 | End: 2018-01-26 | Stop reason: SDUPTHER

## 2018-03-01 DIAGNOSIS — R12 HEARTBURN: Primary | ICD-10-CM

## 2018-03-01 DIAGNOSIS — F41.9 ANXIETY: ICD-10-CM

## 2018-03-01 RX ORDER — OMEPRAZOLE 40 MG/1
1 CAPSULE, DELAYED RELEASE ORAL DAILY
COMMUNITY
Start: 2014-09-12 | End: 2018-03-01 | Stop reason: SDUPTHER

## 2018-03-01 RX ORDER — ALPRAZOLAM 0.25 MG/1
TABLET ORAL
COMMUNITY
Start: 2013-01-10 | End: 2018-03-01 | Stop reason: SDUPTHER

## 2018-03-02 RX ORDER — ALPRAZOLAM 0.25 MG/1
0.25 TABLET ORAL 2 TIMES DAILY PRN
Qty: 30 TABLET | Refills: 0 | OUTPATIENT
Start: 2018-03-02 | End: 2018-04-20 | Stop reason: SDUPTHER

## 2018-03-02 RX ORDER — OMEPRAZOLE 40 MG/1
40 CAPSULE, DELAYED RELEASE ORAL DAILY
Qty: 30 CAPSULE | Refills: 5 | Status: SHIPPED | OUTPATIENT
Start: 2018-03-02 | End: 2018-06-11 | Stop reason: SDUPTHER

## 2018-04-20 DIAGNOSIS — F41.9 ANXIETY: ICD-10-CM

## 2018-04-20 RX ORDER — ALPRAZOLAM 0.25 MG/1
0.25 TABLET ORAL 2 TIMES DAILY PRN
Qty: 30 TABLET | Refills: 0 | OUTPATIENT
Start: 2018-04-20 | End: 2018-04-25 | Stop reason: SDUPTHER

## 2018-04-25 DIAGNOSIS — F41.9 ANXIETY: ICD-10-CM

## 2018-04-25 RX ORDER — ALPRAZOLAM 0.25 MG/1
TABLET ORAL
Qty: 45 TABLET | Refills: 5 | Status: SHIPPED | OUTPATIENT
Start: 2018-04-25 | End: 2018-10-30 | Stop reason: SDUPTHER

## 2018-06-11 DIAGNOSIS — R12 HEARTBURN: ICD-10-CM

## 2018-06-11 DIAGNOSIS — E03.9 HYPOTHYROIDISM, UNSPECIFIED TYPE: ICD-10-CM

## 2018-06-11 RX ORDER — LEVOTHYROXINE SODIUM 0.05 MG/1
50 TABLET ORAL DAILY
Qty: 90 TABLET | Refills: 3 | Status: SHIPPED | OUTPATIENT
Start: 2018-06-11 | End: 2018-07-30 | Stop reason: SDUPTHER

## 2018-06-11 RX ORDER — OMEPRAZOLE 40 MG/1
40 CAPSULE, DELAYED RELEASE ORAL DAILY
Qty: 90 CAPSULE | Refills: 3 | Status: SHIPPED | OUTPATIENT
Start: 2018-06-11 | End: 2019-07-18 | Stop reason: SDUPTHER

## 2018-07-06 PROBLEM — L82.1 SEBORRHEIC KERATOSIS: Status: ACTIVE | Noted: 2017-07-14

## 2018-07-10 ENCOUNTER — TELEPHONE (OUTPATIENT)
Dept: FAMILY MEDICINE CLINIC | Facility: CLINIC | Age: 53
End: 2018-07-10

## 2018-07-10 ENCOUNTER — OFFICE VISIT (OUTPATIENT)
Dept: FAMILY MEDICINE CLINIC | Facility: CLINIC | Age: 53
End: 2018-07-10
Payer: COMMERCIAL

## 2018-07-10 VITALS
BODY MASS INDEX: 32.16 KG/M2 | SYSTOLIC BLOOD PRESSURE: 102 MMHG | DIASTOLIC BLOOD PRESSURE: 70 MMHG | HEIGHT: 70 IN | RESPIRATION RATE: 14 BRPM | WEIGHT: 224.6 LBS | HEART RATE: 64 BPM

## 2018-07-10 DIAGNOSIS — Z13.1 SCREENING FOR DIABETES MELLITUS: ICD-10-CM

## 2018-07-10 DIAGNOSIS — Z13.6 SCREENING FOR CARDIOVASCULAR CONDITION: ICD-10-CM

## 2018-07-10 DIAGNOSIS — E78.2 MIXED HYPERLIPIDEMIA: ICD-10-CM

## 2018-07-10 DIAGNOSIS — Z12.5 SCREENING FOR PROSTATE CANCER: ICD-10-CM

## 2018-07-10 DIAGNOSIS — E03.9 HYPOTHYROIDISM, UNSPECIFIED TYPE: ICD-10-CM

## 2018-07-10 DIAGNOSIS — R73.09 ABNORMAL GLUCOSE: ICD-10-CM

## 2018-07-10 DIAGNOSIS — Z00.00 WELL ADULT EXAM: Primary | ICD-10-CM

## 2018-07-10 DIAGNOSIS — Z12.11 SCREENING FOR COLON CANCER: ICD-10-CM

## 2018-07-10 PROCEDURE — 99396 PREV VISIT EST AGE 40-64: CPT | Performed by: FAMILY MEDICINE

## 2018-07-10 NOTE — PROGRESS NOTES
FAMILY [facility] HEALTH MAINTENANCE OFFICE VISIT  [facility] [place]    NAME: Aminata Morel  AGE: 46 y o  SEX: male  : 1965     DATE: 7/10/2018    Assessment and Plan     Problem List Items Addressed This Visit     Abnormal glucose    Relevant Orders    Hemoglobin A1C    Hyperlipidemia    Relevant Orders    Comprehensive metabolic panel    Lipid Panel with Direct LDL reflex    Hypothyroidism    Relevant Orders    TSH, 3rd generation with Free T4 reflex    Well adult exam - Primary     Needs colonoscopy         Screening for cardiovascular condition    Relevant Orders    Lipid Panel with Direct LDL reflex    Screening for diabetes mellitus    Relevant Orders    Hemoglobin A1C    Screening for colon cancer    Relevant Orders    Ambulatory referral to Gastroenterology    Screening for prostate cancer    Relevant Orders    PSA, Total Screen            · Patient Counseling:   · Nutrition: Stressed importance of a well balanced diet, moderation of sodium/saturated fat, caloric balance and sufficient intake of fiber  · Exercise: Stressed the importance of regular exercise with a goal of 150 minutes per week  · Dental Health: Discussed daily flossing and brushing and regular dental visits   · Alcohol Use:  Recommended moderation of alcohol intake  · Injury Prevention: Discussed Safety Belts, Safety Helmets, and Smoke Detectors    · Immunizations reviewed up to date  · Discussed benefits of screening up to date  · Discussed the patient's BMI with him  The BMI is in the acceptable range     Return in 1 year (on 7/10/2019)          Chief Complaint     Chief Complaint   Patient presents with    Physical Exam     Patient here for annual wellness exam        History of Present Illness     Here for wellness  Recent job change stressful        Well Adult Physical   Patient here for a comprehensive physical exam       Diet and Physical Activity  Diet: well balanced diet  Weight concerns: None, patient's BMI is between 18 5-24 9  Exercise: daily      Depression Screen  PHQ-9 Depression Screening    PHQ-9:    Frequency of the following problems over the past two weeks:       Little interest or pleasure in doing things:  0 - not at all  Feeling down, depressed, or hopeless:  0 - not at all  PHQ-2 Score:  0          General Health  Hearing: Normal:  bilateral  Vision: goes for regular eye exams and wears glasses  Dental: regular dental visits and brushes teeth twice daily    Reproductive Health  Up to date  Needs colonoscopy      The following portions of the patient's history were reviewed and updated as appropriate: allergies, current medications, past family history, past medical history, past social history, past surgical history and problem list     Review of Systems     Review of Systems   Constitutional: Negative  HENT: Negative  Eyes: Negative  Respiratory: Negative  Cardiovascular: Negative  Gastrointestinal: Negative  Endocrine: Negative  Genitourinary: Negative  Musculoskeletal: Negative  Skin: Negative  Allergic/Immunologic: Negative  Neurological: Negative  Hematological: Negative  Psychiatric/Behavioral: Negative          Past Medical History     Past Medical History:   Diagnosis Date    [facility] )     last assessed 1/10/2013       Past Surgical History     Past Surgical History:   Procedure Laterality Date    TONSILLECTOMY AND ADENOIDECTOMY         Social History     Social History     Social History    Marital status: /Civil Union     Spouse name: N/A    Number of children: 3    Years of education: N/A     Occupational History          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      Social History Main Topics    Smoking status: Former Smoker     Quit date: 2007    Smokeless tobacco: Never Used    Alcohol use Yes      Comment: occ    Drug use: No    Sexual activity: Yes     Partners: Female Other Topics Concern    None     Social History Narrative    Recreational activities martial arts        Family History     Family History   Problem Relation Age of Onset    Diabetes Mother         DM    Hypothyroidism Mother    Evonne Akins Glaucoma Mother     Hypertension Mother     Alcohol abuse Sister     Drug abuse Sister     Hypothyroidism Sister     Lung cancer Maternal Grandmother        Current Medications       Current Outpatient Prescriptions:     ALPRAZolam (XANAX) 0 25 mg tablet, Take 1 5 tablets daily at bedtime as needed for anxiety  , Disp: 45 tablet, Rfl: 5    aspirin 81 MG tablet, Take 1 tablet by mouth daily, Disp: , Rfl:     levothyroxine 50 mcg tablet, Take 1 tablet (50 mcg total) by mouth daily, Disp: 90 tablet, Rfl: 3    Multiple Vitamins-Minerals (CENTRUM SILVER 50+MEN PO), Take 1 tablet by mouth daily, Disp: , Rfl:     omeprazole (PriLOSEC) 40 MG capsule, Take 1 capsule (40 mg total) by mouth daily, Disp: 90 capsule, Rfl: 3     Allergies     Allergies   Allergen Reactions    Esomeprazole        Objective     /70   Pulse 64   Resp 14   Ht 5' 10 12" (1 781 m)   Wt 102 kg (224 lb 9 6 oz)   BMI 32 12 kg/m²      Physical Exam   Constitutional: He is oriented to person, place, and time  Vital signs are normal  He appears well-developed and well-nourished  HENT:   Head: Normocephalic and atraumatic  Right Ear: External ear normal    Left Ear: External ear normal    Nose: Nose normal    Mouth/Throat: Oropharynx is clear and moist    Eyes: Conjunctivae, EOM and lids are normal  Pupils are equal, round, and reactive to light  Neck: Normal range of motion  Neck supple  Cardiovascular: Normal rate, regular rhythm, S1 normal, S2 normal, normal heart sounds, intact distal pulses and normal pulses  Pulmonary/Chest: Effort normal and breath sounds normal    Abdominal: Soft  Normal appearance and bowel sounds are normal    Musculoskeletal: Normal range of motion     Neurological: He is alert and oriented to person, place, and time  He has normal strength and normal reflexes  Skin: Skin is warm and dry  Psychiatric: He has a normal mood and affect  His speech is normal and behavior is normal  Judgment and thought content normal  Cognition and memory are normal    Nursing note and vitals reviewed          No exam data present    Health Maintenance     Health Maintenance   Topic Date Due    HIV SCREENING  1965    Hepatitis C Screening  1965    CRC Screening: Colonoscopy  1965    PNEUMOCOCCAL POLYSACCHARIDE VACCINE X 2 AGE 11-64 YEARS IMMUNOCOMPROMISED (1) 02/24/1975    INFLUENZA VACCINE  09/01/2018    Depression Screening PHQ-9  07/10/2019    DTaP,Tdap,and Td Vaccines (3 - Td) 03/12/2024     Immunization History   Administered Date(s) Administered    Influenza 10/31/2014, 01/23/2017, 12/04/2017    Influenza Quadrivalent Preservative Free 3 years and older IM 10/30/2015, 01/23/2017, 12/04/2017    Influenza TIV (IM) 12/12/2013    Tdap 09/27/2010, 03/12/2014       Waylan Lung, DO  [address] [facility]

## 2018-07-10 NOTE — TELEPHONE ENCOUNTER
Patient forgot to mention he needs a doctor's note for today's visit, patient is able to come back to work tomorrow  Please advise if I can write that up for him  Thank you!

## 2018-07-10 NOTE — PATIENT INSTRUCTIONS
Thank you for enrolling in [facility]  Please follow the instructions below to securely access your online medical record  [employer] allows you to send messages to your doctor, view your test results, renew your prescriptions, schedule appointments, and more  [address] uses Single Sign on (SSO) Technology for you to log in and access our [facility] SPECIALTY [facility], including [employer]  No more remembering multiple user names and passwords! We are going to guide you through, step by step, to help you set up your [employer] account which will provide access to your [employer] account  How Do I Sign Up? 1  In your Internet browser, go to [email or web address]       2  Click on the [facility] patient account and then click Dont have an                 Account? Create one now      3  Enter your demographic information and chose a user name (email address) and password  Think of one that is secure and easy to remember  Enter a Referral code if you have one (this is not your [employer] code ) Accept the Terms and Conditions and the Privacy Policy  4  Select your security questions that you will use to reset your password should you forget it  Click Submit  5  Enter your [employer] Activation Code exactly as it appears below  You will not need to use this code after you have completed the sign-up process  If you do not sign up before the expiration date, you must request a new code  [employer] Activation Code: [email or web address]  Expires: 7/24/2018  1:37 PM    6  Confirm your email address  An email confirmation was sent to you  Please open that email and click Confirm your Email   You should then be redirected to our [employer] Single sign on page, where you will log on with the user name and password you created! Proceed to the [employer] Icon to view your personal health information          Additional Information  If you have questions, you can e-mail patient  [email or web address] or call [phone] to talk to our customer support staff  Remember, MyChart is NOT to be used for urgent needs  For medical emergencies, dial 911

## 2018-07-24 ENCOUNTER — APPOINTMENT (OUTPATIENT)
Dept: LAB | Age: 53
End: 2018-07-24
Payer: COMMERCIAL

## 2018-07-24 DIAGNOSIS — E78.2 MIXED HYPERLIPIDEMIA: ICD-10-CM

## 2018-07-24 DIAGNOSIS — E03.9 HYPOTHYROIDISM, UNSPECIFIED TYPE: ICD-10-CM

## 2018-07-24 DIAGNOSIS — R73.09 ABNORMAL GLUCOSE: ICD-10-CM

## 2018-07-24 DIAGNOSIS — Z12.5 SCREENING FOR PROSTATE CANCER: ICD-10-CM

## 2018-07-24 DIAGNOSIS — Z13.1 SCREENING FOR DIABETES MELLITUS: ICD-10-CM

## 2018-07-24 DIAGNOSIS — Z13.6 SCREENING FOR CARDIOVASCULAR CONDITION: ICD-10-CM

## 2018-07-24 LAB
ALBUMIN SERPL BCP-MCNC: 4 G/DL (ref 3.5–5)
ALP SERPL-CCNC: 62 U/L (ref 46–116)
ALT SERPL W P-5'-P-CCNC: 45 U/L (ref 12–78)
ANION GAP SERPL CALCULATED.3IONS-SCNC: 4 MMOL/L (ref 4–13)
AST SERPL W P-5'-P-CCNC: 37 U/L (ref 5–45)
BILIRUB SERPL-MCNC: 0.71 MG/DL (ref 0.2–1)
BUN SERPL-MCNC: 21 MG/DL (ref 5–25)
CALCIUM SERPL-MCNC: 8.5 MG/DL (ref 8.3–10.1)
CHLORIDE SERPL-SCNC: 105 MMOL/L (ref 100–108)
CHOLEST SERPL-MCNC: 205 MG/DL (ref 50–200)
CO2 SERPL-SCNC: 26 MMOL/L (ref 21–32)
CREAT SERPL-MCNC: 1 MG/DL (ref 0.6–1.3)
EST. AVERAGE GLUCOSE BLD GHB EST-MCNC: 126 MG/DL
GFR SERPL CREATININE-BSD FRML MDRD: 85 ML/MIN/1.73SQ M
GLUCOSE P FAST SERPL-MCNC: 109 MG/DL (ref 65–99)
HBA1C MFR BLD: 6 % (ref 4.2–6.3)
HDLC SERPL-MCNC: 36 MG/DL (ref 40–60)
LDLC SERPL CALC-MCNC: 126 MG/DL (ref 0–100)
POTASSIUM SERPL-SCNC: 3.9 MMOL/L (ref 3.5–5.3)
PROT SERPL-MCNC: 7.8 G/DL (ref 6.4–8.2)
PSA SERPL-MCNC: 1.6 NG/ML (ref 0–4)
SODIUM SERPL-SCNC: 135 MMOL/L (ref 136–145)
T4 FREE SERPL-MCNC: 1.1 NG/DL (ref 0.76–1.46)
TRIGL SERPL-MCNC: 213 MG/DL
TSH SERPL DL<=0.05 MIU/L-ACNC: 4.2 UIU/ML (ref 0.36–3.74)

## 2018-07-24 PROCEDURE — 80053 COMPREHEN METABOLIC PANEL: CPT

## 2018-07-24 PROCEDURE — 36415 COLL VENOUS BLD VENIPUNCTURE: CPT

## 2018-07-24 PROCEDURE — 83036 HEMOGLOBIN GLYCOSYLATED A1C: CPT

## 2018-07-24 PROCEDURE — 80061 LIPID PANEL: CPT

## 2018-07-24 PROCEDURE — 84439 ASSAY OF FREE THYROXINE: CPT

## 2018-07-24 PROCEDURE — G0103 PSA SCREENING: HCPCS

## 2018-07-24 PROCEDURE — 84443 ASSAY THYROID STIM HORMONE: CPT

## 2018-07-30 DIAGNOSIS — E03.9 HYPOTHYROIDISM, UNSPECIFIED TYPE: ICD-10-CM

## 2018-07-30 RX ORDER — LEVOTHYROXINE SODIUM 0.07 MG/1
75 TABLET ORAL DAILY
Qty: 90 TABLET | Refills: 3 | Status: SHIPPED | OUTPATIENT
Start: 2018-07-30 | End: 2019-08-22 | Stop reason: SDUPTHER

## 2018-10-06 ENCOUNTER — IMMUNIZATION (OUTPATIENT)
Dept: FAMILY MEDICINE CLINIC | Facility: CLINIC | Age: 53
End: 2018-10-06
Payer: COMMERCIAL

## 2018-10-06 DIAGNOSIS — Z23 ENCOUNTER FOR IMMUNIZATION: ICD-10-CM

## 2018-10-06 PROCEDURE — 90471 IMMUNIZATION ADMIN: CPT

## 2018-10-06 PROCEDURE — 90686 IIV4 VACC NO PRSV 0.5 ML IM: CPT

## 2018-10-30 DIAGNOSIS — F41.9 ANXIETY: Primary | ICD-10-CM

## 2018-10-30 RX ORDER — ALPRAZOLAM 0.25 MG/1
TABLET ORAL
Qty: 45 TABLET | Refills: 0 | Status: SHIPPED | OUTPATIENT
Start: 2018-10-30 | End: 2018-12-09 | Stop reason: SDUPTHER

## 2018-12-09 DIAGNOSIS — F41.9 ANXIETY: ICD-10-CM

## 2018-12-10 RX ORDER — ALPRAZOLAM 0.25 MG/1
TABLET ORAL
Qty: 45 TABLET | Refills: 3 | Status: SHIPPED | OUTPATIENT
Start: 2018-12-10 | End: 2019-07-18 | Stop reason: SDUPTHER

## 2019-07-18 DIAGNOSIS — F41.9 ANXIETY: ICD-10-CM

## 2019-07-18 DIAGNOSIS — R12 HEARTBURN: ICD-10-CM

## 2019-07-18 RX ORDER — ALPRAZOLAM 0.25 MG/1
TABLET ORAL
Qty: 45 TABLET | Refills: 3 | Status: SHIPPED | OUTPATIENT
Start: 2019-07-18 | End: 2020-02-14

## 2019-07-18 RX ORDER — OMEPRAZOLE 40 MG/1
40 CAPSULE, DELAYED RELEASE ORAL DAILY
Qty: 90 CAPSULE | Refills: 3 | Status: SHIPPED | OUTPATIENT
Start: 2019-07-18 | End: 2020-08-24

## 2019-08-06 ENCOUNTER — OFFICE VISIT (OUTPATIENT)
Dept: FAMILY MEDICINE CLINIC | Facility: CLINIC | Age: 54
End: 2019-08-06
Payer: COMMERCIAL

## 2019-08-06 VITALS
BODY MASS INDEX: 33.84 KG/M2 | HEART RATE: 74 BPM | HEIGHT: 70 IN | DIASTOLIC BLOOD PRESSURE: 80 MMHG | WEIGHT: 236.4 LBS | TEMPERATURE: 97.8 F | RESPIRATION RATE: 13 BRPM | OXYGEN SATURATION: 97 % | SYSTOLIC BLOOD PRESSURE: 114 MMHG

## 2019-08-06 DIAGNOSIS — K21.9 GASTROESOPHAGEAL REFLUX DISEASE WITHOUT ESOPHAGITIS: ICD-10-CM

## 2019-08-06 DIAGNOSIS — Z11.59 ENCOUNTER FOR HEPATITIS C SCREENING TEST FOR LOW RISK PATIENT: ICD-10-CM

## 2019-08-06 DIAGNOSIS — Z12.5 SCREENING FOR PROSTATE CANCER: ICD-10-CM

## 2019-08-06 DIAGNOSIS — E78.2 MIXED HYPERLIPIDEMIA: Primary | ICD-10-CM

## 2019-08-06 DIAGNOSIS — Z12.11 SCREENING FOR COLON CANCER: ICD-10-CM

## 2019-08-06 DIAGNOSIS — E03.9 ACQUIRED HYPOTHYROIDISM: ICD-10-CM

## 2019-08-06 DIAGNOSIS — R73.09 ABNORMAL GLUCOSE: ICD-10-CM

## 2019-08-06 PROCEDURE — 99214 OFFICE O/P EST MOD 30 MIN: CPT | Performed by: FAMILY MEDICINE

## 2019-08-06 NOTE — PROGRESS NOTES
Assessment/Plan:    Problem List Items Addressed This Visit        Digestive    GERD (gastroesophageal reflux disease)     Failed pepcid  Stable on omeprazole         Relevant Orders    Ambulatory referral to Gastroenterology       Endocrine    Hypothyroidism     Stable on levothyroxine         Relevant Orders    TSH, 3rd generation with Free T4 reflex       Other    Abnormal glucose    Relevant Orders    Hemoglobin A1C    Hyperlipidemia - Primary     Check lipids         Relevant Orders    CBC    Comprehensive metabolic panel    Lipid Panel with Direct LDL reflex    Screening for colon cancer    Relevant Orders    Ambulatory referral to Gastroenterology    Screening for prostate cancer    Relevant Orders    PSA, Total Screen      Other Visit Diagnoses     Encounter for hepatitis C screening test for low risk patient        Relevant Orders    Hepatitis C antibody          BMI Counseling: Body mass index is 33 84 kg/m²  Discussed the patient's BMI with him  The BMI is above average  BMI counseling and education was provided to the patient  Nutrition recommendations include 3-5 servings of fruits/vegetables daily  Exercise recommendations include exercising 3-5 times per week  There are no Patient Instructions on file for this visit  Return in about 6 months (around 2/6/2020)  Subjective:      Patient ID: Shawna Burch is a 53 y o  male  Chief Complaint   Patient presents with    Follow-up     Patient here for 6 month follow up on Hypothyroidism, GERD, Anxiety       Here for follow up  Feeling good  Needs colonoscopy    Thyroid Problem   Presents for follow-up visit  Symptoms include anxiety  The symptoms have been stable  Heartburn   He complains of abdominal pain  This is a chronic problem  The current episode started more than 1 year ago  The problem occurs constantly  Nothing aggravates the symptoms  There are no known risk factors  He has tried a PPI for the symptoms   The treatment provided no relief  The following portions of the patient's history were reviewed and updated as appropriate: allergies, current medications, past family history, past medical history, past social history, past surgical history and problem list     Review of Systems   Constitutional: Negative  HENT: Negative  Eyes: Negative  Respiratory: Negative  Cardiovascular: Negative  Gastrointestinal: Positive for abdominal pain  Endocrine: Negative  Genitourinary: Negative  Musculoskeletal: Negative  Skin: Negative  Allergic/Immunologic: Negative  Neurological: Negative  Hematological: Negative  Psychiatric/Behavioral: The patient is nervous/anxious  Current Outpatient Medications   Medication Sig Dispense Refill    ALPRAZolam (XANAX) 0 25 mg tablet Take one and half tablets by mouth before bedtime 45 tablet 3    aspirin 81 MG tablet Take 1 tablet by mouth daily      levothyroxine 75 mcg tablet Take 1 tablet (75 mcg total) by mouth daily 90 tablet 3    Multiple Vitamins-Minerals (CENTRUM SILVER 50+MEN PO) Take 1 tablet by mouth daily      omeprazole (PriLOSEC) 40 MG capsule Take 1 capsule (40 mg total) by mouth daily 90 capsule 3     No current facility-administered medications for this visit  Objective:    /80   Pulse 74   Temp 97 8 °F (36 6 °C)   Resp 13   Ht 5' 10 08" (1 78 m)   Wt 107 kg (236 lb 6 4 oz)   SpO2 97%   BMI 33 84 kg/m²        Physical Exam   Constitutional: He appears well-developed and well-nourished  HENT:   Head: Normocephalic and atraumatic  Eyes: Pupils are equal, round, and reactive to light  EOM are normal    Neck: Normal range of motion  Neck supple  Cardiovascular: Normal rate, regular rhythm, normal heart sounds and intact distal pulses  Pulmonary/Chest: Effort normal and breath sounds normal    Abdominal: Soft  Bowel sounds are normal    Musculoskeletal: Normal range of motion  Neurological: He is alert     Skin: Skin is warm  Capillary refill takes less than 2 seconds  Psychiatric: He has a normal mood and affect  His behavior is normal    Nursing note and vitals reviewed               Jeremías Gamble,

## 2019-08-21 ENCOUNTER — APPOINTMENT (OUTPATIENT)
Dept: LAB | Age: 54
End: 2019-08-21
Payer: COMMERCIAL

## 2019-08-21 ENCOUNTER — TRANSCRIBE ORDERS (OUTPATIENT)
Dept: ADMINISTRATIVE | Age: 54
End: 2019-08-21

## 2019-08-21 DIAGNOSIS — E03.9 ACQUIRED HYPOTHYROIDISM: ICD-10-CM

## 2019-08-21 DIAGNOSIS — Z11.59 ENCOUNTER FOR HEPATITIS C SCREENING TEST FOR LOW RISK PATIENT: ICD-10-CM

## 2019-08-21 DIAGNOSIS — E78.2 MIXED HYPERLIPIDEMIA: ICD-10-CM

## 2019-08-21 DIAGNOSIS — Z12.5 SCREENING FOR PROSTATE CANCER: ICD-10-CM

## 2019-08-21 DIAGNOSIS — R73.09 ABNORMAL GLUCOSE: ICD-10-CM

## 2019-08-21 LAB
ALBUMIN SERPL BCP-MCNC: 3.9 G/DL (ref 3.5–5)
ALP SERPL-CCNC: 60 U/L (ref 46–116)
ALT SERPL W P-5'-P-CCNC: 51 U/L (ref 12–78)
ANION GAP SERPL CALCULATED.3IONS-SCNC: 9 MMOL/L (ref 4–13)
AST SERPL W P-5'-P-CCNC: 40 U/L (ref 5–45)
BILIRUB SERPL-MCNC: 0.65 MG/DL (ref 0.2–1)
BUN SERPL-MCNC: 23 MG/DL (ref 5–25)
CALCIUM SERPL-MCNC: 9.1 MG/DL (ref 8.3–10.1)
CHLORIDE SERPL-SCNC: 103 MMOL/L (ref 100–108)
CHOLEST SERPL-MCNC: 411 MG/DL (ref 50–200)
CO2 SERPL-SCNC: 28 MMOL/L (ref 21–32)
CREAT SERPL-MCNC: 1.07 MG/DL (ref 0.6–1.3)
ERYTHROCYTE [DISTWIDTH] IN BLOOD BY AUTOMATED COUNT: 16.7 % (ref 11.6–15.1)
EST. AVERAGE GLUCOSE BLD GHB EST-MCNC: 120 MG/DL
GFR SERPL CREATININE-BSD FRML MDRD: 78 ML/MIN/1.73SQ M
GLUCOSE P FAST SERPL-MCNC: 97 MG/DL (ref 65–99)
HBA1C MFR BLD: 5.8 % (ref 4.2–6.3)
HCT VFR BLD AUTO: 49.9 % (ref 36.5–49.3)
HCV AB SER QL: NORMAL
HDLC SERPL-MCNC: 11 MG/DL (ref 40–60)
HGB BLD-MCNC: 16.2 G/DL (ref 12–17)
LDLC SERPL CALC-MCNC: 362 MG/DL (ref 0–100)
MCH RBC QN AUTO: 27.5 PG (ref 26.8–34.3)
MCHC RBC AUTO-ENTMCNC: 32.5 G/DL (ref 31.4–37.4)
MCV RBC AUTO: 85 FL (ref 82–98)
PLATELET # BLD AUTO: 354 THOUSANDS/UL (ref 149–390)
PMV BLD AUTO: 8.5 FL (ref 8.9–12.7)
POTASSIUM SERPL-SCNC: 4.3 MMOL/L (ref 3.5–5.3)
PROT SERPL-MCNC: 8.1 G/DL (ref 6.4–8.2)
PSA SERPL-MCNC: 0.9 NG/ML (ref 0–4)
RBC # BLD AUTO: 5.9 MILLION/UL (ref 3.88–5.62)
SODIUM SERPL-SCNC: 140 MMOL/L (ref 136–145)
T4 FREE SERPL-MCNC: 0.92 NG/DL (ref 0.76–1.46)
TRIGL SERPL-MCNC: 191 MG/DL
TSH SERPL DL<=0.05 MIU/L-ACNC: 4.7 UIU/ML (ref 0.36–3.74)
WBC # BLD AUTO: 4.83 THOUSAND/UL (ref 4.31–10.16)

## 2019-08-21 PROCEDURE — 84439 ASSAY OF FREE THYROXINE: CPT

## 2019-08-21 PROCEDURE — 83036 HEMOGLOBIN GLYCOSYLATED A1C: CPT

## 2019-08-21 PROCEDURE — 80061 LIPID PANEL: CPT

## 2019-08-21 PROCEDURE — 86803 HEPATITIS C AB TEST: CPT

## 2019-08-21 PROCEDURE — 84443 ASSAY THYROID STIM HORMONE: CPT

## 2019-08-21 PROCEDURE — 36415 COLL VENOUS BLD VENIPUNCTURE: CPT

## 2019-08-21 PROCEDURE — 80053 COMPREHEN METABOLIC PANEL: CPT

## 2019-08-21 PROCEDURE — G0103 PSA SCREENING: HCPCS

## 2019-08-21 PROCEDURE — 85027 COMPLETE CBC AUTOMATED: CPT

## 2019-08-22 ENCOUNTER — OFFICE VISIT (OUTPATIENT)
Dept: FAMILY MEDICINE CLINIC | Facility: CLINIC | Age: 54
End: 2019-08-22
Payer: COMMERCIAL

## 2019-08-22 VITALS
RESPIRATION RATE: 14 BRPM | OXYGEN SATURATION: 96 % | TEMPERATURE: 98.1 F | HEART RATE: 62 BPM | HEIGHT: 70 IN | BODY MASS INDEX: 33.04 KG/M2 | DIASTOLIC BLOOD PRESSURE: 78 MMHG | WEIGHT: 230.8 LBS | SYSTOLIC BLOOD PRESSURE: 120 MMHG

## 2019-08-22 DIAGNOSIS — E03.9 ACQUIRED HYPOTHYROIDISM: Primary | ICD-10-CM

## 2019-08-22 DIAGNOSIS — R73.09 ABNORMAL GLUCOSE: ICD-10-CM

## 2019-08-22 DIAGNOSIS — E03.9 HYPOTHYROIDISM, UNSPECIFIED TYPE: ICD-10-CM

## 2019-08-22 DIAGNOSIS — E78.2 MIXED HYPERLIPIDEMIA: ICD-10-CM

## 2019-08-22 PROCEDURE — 99214 OFFICE O/P EST MOD 30 MIN: CPT | Performed by: FAMILY MEDICINE

## 2019-08-22 PROCEDURE — 3008F BODY MASS INDEX DOCD: CPT | Performed by: FAMILY MEDICINE

## 2019-08-22 PROCEDURE — 1036F TOBACCO NON-USER: CPT | Performed by: FAMILY MEDICINE

## 2019-08-22 RX ORDER — ATORVASTATIN CALCIUM 10 MG/1
10 TABLET, FILM COATED ORAL DAILY
Qty: 90 TABLET | Refills: 3 | Status: SHIPPED | OUTPATIENT
Start: 2019-08-22 | End: 2019-12-04 | Stop reason: SDUPTHER

## 2019-08-22 RX ORDER — LEVOTHYROXINE SODIUM 0.1 MG/1
100 TABLET ORAL
Qty: 90 TABLET | Refills: 3 | Status: SHIPPED | OUTPATIENT
Start: 2019-08-22 | End: 2019-10-21 | Stop reason: SDUPTHER

## 2019-08-22 NOTE — PROGRESS NOTES
Assessment/Plan:    Problem List Items Addressed This Visit        Endocrine    Hypothyroidism - Primary    Relevant Medications    levothyroxine 100 mcg tablet    Other Relevant Orders    TSH, 3rd generation with Free T4 reflex       Other    Abnormal glucose     Improved a1c         Hyperlipidemia     Start lipitor  Check in 4 months         Relevant Medications    atorvastatin (LIPITOR) 10 mg tablet    Other Relevant Orders    Comprehensive metabolic panel    Lipid Panel with Direct LDL reflex          BMI Counseling: Body mass index is 33 04 kg/m²  Discussed the patient's BMI with him  The BMI is above average  BMI counseling and education was provided to the patient  Nutrition recommendations include reducing portion sizes and 3-5 servings of fruits/vegetables daily  Exercise recommendations include exercising 3-5 times per week  There are no Patient Instructions on file for this visit  Return in about 4 months (around 12/22/2019)  Subjective:      Patient ID: Eileen Gonzalez is a 53 y o  male  Chief Complaint   Patient presents with    Follow-up     Patient here to discuss lab results       Here for follow up of lab work   Diet can be better  Diet high in red meat  Coffee now with cream  [id number]    Hyperlipidemia   This is a chronic problem  The current episode started more than 1 year ago  The problem is uncontrolled  He is currently on no antihyperlipidemic treatment  The current treatment provides no improvement of lipids  The following portions of the patient's history were reviewed and updated as appropriate: allergies, current medications, past family history, past medical history, past social history, past surgical history and problem list     Review of Systems   Constitutional: Negative  HENT: Negative  Eyes: Negative  Respiratory: Negative  Cardiovascular: Negative  Gastrointestinal: Negative  Endocrine: Negative  Genitourinary: Negative  Musculoskeletal: Negative  Skin: Negative  Allergic/Immunologic: Negative  Neurological: Negative  Hematological: Negative  Psychiatric/Behavioral: Negative  Current Outpatient Medications   Medication Sig Dispense Refill    ALPRAZolam (XANAX) 0 25 mg tablet Take one and half tablets by mouth before bedtime 45 tablet 3    aspirin 81 MG tablet Take 1 tablet by mouth daily      levothyroxine 100 mcg tablet Take 1 tablet (100 mcg total) by mouth daily in the early morning 90 tablet 3    Multiple Vitamins-Minerals (CENTRUM SILVER 50+MEN PO) Take 1 tablet by mouth daily      omeprazole (PriLOSEC) 40 MG capsule Take 1 capsule (40 mg total) by mouth daily 90 capsule 3    atorvastatin (LIPITOR) 10 mg tablet Take 1 tablet (10 mg total) by mouth daily 90 tablet 3     No current facility-administered medications for this visit  Objective:    /78   Pulse 62   Temp 98 1 °F (36 7 °C)   Resp 14   Ht 5' 10 08" (1 78 m)   Wt 105 kg (230 lb 12 8 oz)   SpO2 96%   BMI 33 04 kg/m²        Physical Exam   Constitutional: He appears well-developed and well-nourished  HENT:   Head: Normocephalic and atraumatic  Eyes: Pupils are equal, round, and reactive to light  Neck: Normal range of motion  Neck supple  Cardiovascular: Normal rate, regular rhythm, normal heart sounds and intact distal pulses  Pulmonary/Chest: Effort normal and breath sounds normal    Abdominal: Soft  Bowel sounds are normal    Musculoskeletal: Normal range of motion  Neurological: He is alert  Skin: Skin is warm  Nursing note and vitals reviewed               Tiana Ruth DO

## 2019-10-19 ENCOUNTER — IMMUNIZATIONS (OUTPATIENT)
Dept: FAMILY MEDICINE CLINIC | Facility: CLINIC | Age: 54
End: 2019-10-19
Payer: COMMERCIAL

## 2019-10-19 DIAGNOSIS — Z23 ENCOUNTER FOR IMMUNIZATION: ICD-10-CM

## 2019-10-19 PROCEDURE — 90471 IMMUNIZATION ADMIN: CPT | Performed by: NURSE PRACTITIONER

## 2019-10-19 PROCEDURE — 90682 RIV4 VACC RECOMBINANT DNA IM: CPT | Performed by: NURSE PRACTITIONER

## 2019-10-21 ENCOUNTER — OFFICE VISIT (OUTPATIENT)
Dept: GASTROENTEROLOGY | Facility: AMBULARY SURGERY CENTER | Age: 54
End: 2019-10-21
Payer: COMMERCIAL

## 2019-10-21 ENCOUNTER — TRANSCRIBE ORDERS (OUTPATIENT)
Dept: LAB | Facility: CLINIC | Age: 54
End: 2019-10-21

## 2019-10-21 ENCOUNTER — APPOINTMENT (OUTPATIENT)
Dept: LAB | Facility: CLINIC | Age: 54
End: 2019-10-21
Payer: COMMERCIAL

## 2019-10-21 VITALS
DIASTOLIC BLOOD PRESSURE: 80 MMHG | SYSTOLIC BLOOD PRESSURE: 130 MMHG | RESPIRATION RATE: 14 BRPM | HEART RATE: 68 BPM | WEIGHT: 236.4 LBS | HEIGHT: 70 IN | TEMPERATURE: 98 F | BODY MASS INDEX: 33.84 KG/M2

## 2019-10-21 DIAGNOSIS — E03.9 HYPOTHYROIDISM, UNSPECIFIED TYPE: ICD-10-CM

## 2019-10-21 DIAGNOSIS — Z12.11 SCREENING FOR COLON CANCER: ICD-10-CM

## 2019-10-21 DIAGNOSIS — R12 HEARTBURN: ICD-10-CM

## 2019-10-21 DIAGNOSIS — K21.9 GASTROESOPHAGEAL REFLUX DISEASE WITHOUT ESOPHAGITIS: ICD-10-CM

## 2019-10-21 LAB
T4 FREE SERPL-MCNC: 0.73 NG/DL (ref 0.76–1.46)
TSH SERPL DL<=0.05 MIU/L-ACNC: 4.65 UIU/ML (ref 0.36–3.74)

## 2019-10-21 PROCEDURE — 84439 ASSAY OF FREE THYROXINE: CPT

## 2019-10-21 PROCEDURE — 84443 ASSAY THYROID STIM HORMONE: CPT

## 2019-10-21 PROCEDURE — 36415 COLL VENOUS BLD VENIPUNCTURE: CPT

## 2019-10-21 PROCEDURE — 99244 OFF/OP CNSLTJ NEW/EST MOD 40: CPT | Performed by: INTERNAL MEDICINE

## 2019-10-21 RX ORDER — LEVOTHYROXINE SODIUM 112 UG/1
112 TABLET ORAL
Qty: 90 TABLET | Refills: 3 | Status: SHIPPED | OUTPATIENT
Start: 2019-10-21 | End: 2020-12-08 | Stop reason: SDUPTHER

## 2019-10-21 NOTE — PROGRESS NOTES
Consultation - [facility]  Alonso Rinne 53 y o  male MRN: [id number]  Unit/Bed#:  Encounter: [id number]        Consults    ASSESSMENT/PLAN:     1  Colon cancer screening-average risk, no previous colonoscopy  No family history of colon cancer   -will schedule average risk screening colonoscopy at this time  Patient was explained about  the risks and benefits of the procedure  Risks including but not limited to bleeding, infection, perforation were explained in detail  Also explained about less than 100% sensitivity with the exam and other alternatives  -he is not on any antiplatelets or anticoagulants  2  GERD-likely has underlying hiatal hernia or decreased LES pressure  Given chronicity of symptoms, has had symptoms for at least 5 years, will schedule EGD to assess for Laureano's esophagus  -will continue omeprazole 40 mg on daily basis at this time  Patient is instructed to take PPI 30 minutes before dinner given symptoms are predominantly at nighttime   - Patient was explained about the lifestyle and dietary modifications  Advised to avoid fatty foods, chocolates, caffeine, alcohol and any other triggering foods  Avoid eating for at least 3 hours before going to bed  -hemoglobin 16 2, platelets are 239, LFTs are normal   TSH was 4 7  3  Loose stools-likely IBS versus less likely malabsorption versus less likely inflammatory bowel disease   -will biopsy for celiac disease at the time of the EGD  -will assess for IBD at the time of the colonoscopy   -biopsy for microscopic colitis       ______________________________________________________________________    Reason for Consult / Principal Problem: [unfilled]    HPI: Alonso Rinne is a 53y o  year old male with history of hypothyroidism, anxiety, hyperlipidemia, longstanding symptoms of GERD, presents for colon cancer screening evaluation  Patient states that he has never undergone a colonoscopy    He does endorse occasional symptoms of loose stools which are typically associated with intake of beer and stress  Denies unintentional weight loss, hematochezia, melena, dysphagia, loss of appetite or nausea and vomiting  Denies any recent travel, no sick contacts, or antibiotic use  He states that he also has a daily acid reflux symptoms which are well controlled with omeprazole 40 mg daily  Patient states that he has taken omeprazole over-the-counter in the past but over the past 1 year has been on daily dose which he takes at nighttime  Denies family history of colon cancer  No previous colonoscopy  Patient is a former smoker and quit smoking   He drinks socially  Review of Systems: The remainder of the review of systems was negative except for the pertinent positives noted in HPI  Historical Information   Past Medical History:   Diagnosis Date    Pulmonary embolism ([address] )     last assessed 1/10/2013    Snoring 2016     Past Surgical History:   Procedure Laterality Date    TONSILLECTOMY AND ADENOIDECTOMY       Social History   Social History     Substance and Sexual Activity   Alcohol Use Yes    Comment: occ     Social History     Substance and Sexual Activity   Drug Use No     Social History     Tobacco Use   Smoking Status Former Smoker    Last attempt to quit:     Years since quittin 8   Smokeless Tobacco Never Used     Family History   Problem Relation Age of Onset    Diabetes Mother         DM    Hypothyroidism Mother    [facility] Glaucoma Mother     Hypertension Mother     Alcohol abuse Sister     Drug abuse Sister     Hypothyroidism Sister     Lung cancer Maternal Grandmother        Meds/Allergies       (Not in a hospital admission)  No current facility-administered medications for this visit  Allergies   Allergen Reactions    Esomeprazole        Objective     Blood pressure 130/80, pulse 68, temperature 98 °F (36 7 °C), temperature source Tympanic, resp   rate 14, height 5' 10" (1 778 m), weight 107 kg (236 lb 6 4 oz)  [unfilled]    PHYSICAL EXAM     GEN: well nourished, well developed, no acute distress  HEENT: anicteric, MMM, no cervical or supraclavicular lymphadenopathy  CV: RRR, no m/r/g  CHEST: CTA b/l, no WRR  ABD: +BS, soft, NT/ND, no hepatosplenomegaly  EXT: no c/c/e  SKIN: no rashes,  NEURO: aaox3    Lab Results:   No visits with results within 1 Day(s) from this visit     Latest known visit with results is:   Appointment on 08/21/2019   Component Date Value    WBC 08/21/2019 4 83     RBC 08/21/2019 5 90*    Hemoglobin 08/21/2019 16 2     Hematocrit 08/21/2019 49 9*    MCV 08/21/2019 85     MCH 08/21/2019 27 5     MCHC 08/21/2019 32 5     RDW 08/21/2019 16 7*    Platelets 27/70/9555 354     MPV 08/21/2019 8 5*    Sodium 08/21/2019 140     Potassium 08/21/2019 4 3     Chloride 08/21/2019 103     CO2 08/21/2019 28     ANION GAP 08/21/2019 9     BUN 08/21/2019 23     Creatinine 08/21/2019 1 07     Glucose, Fasting 08/21/2019 97     Calcium 08/21/2019 9 1     AST 08/21/2019 40     ALT 08/21/2019 51     Alkaline Phosphatase 08/21/2019 60     Total Protein 08/21/2019 8 1     Albumin 08/21/2019 3 9     Total Bilirubin 08/21/2019 0 65     eGFR 08/21/2019 78     Cholesterol 08/21/2019 411*    Triglycerides 08/21/2019 191*    HDL, Direct 08/21/2019 11*    LDL Calculated 08/21/2019 362*    TSH 3RD GENERATON 08/21/2019 4 700*    PSA 08/21/2019 0 9     Hepatitis C Ab 08/21/2019 Non-reactive     Hemoglobin A1C 08/21/2019 5 8     EAG 08/21/2019 120     Free T4 08/21/2019 0 92      Imaging Studies: I have personally reviewed pertinent films in PACS

## 2019-10-21 NOTE — LETTER
October 21, 2019     Arnold Lindo, [address] [facility]  [address] [place] [address]    Patient: Nirmala Warren   YOB: 1965   Date of Visit: 10/21/2019       Dear Dr Kecia Nunez:    Thank you for referring Ashley Yuan to me for evaluation  Below are my notes for this consultation  If you have questions, please do not hesitate to call me  I look forward to following your patient along with you  Sincerely,        Dominique Overton MD        CC: No Recipients  Dominique Overton MD  10/21/2019 10:19 AM  Sign at close encounter  Consultation - [facility]  Nirmala Warren 53 y o  male MRN: [id number]  Unit/Bed#:  Encounter: [id number]        Consults    ASSESSMENT/PLAN:     1  Colon cancer screening-average risk, no previous colonoscopy  No family history of colon cancer   -will schedule average risk screening colonoscopy at this time  Patient was explained about  the risks and benefits of the procedure  Risks including but not limited to bleeding, infection, perforation were explained in detail  Also explained about less than 100% sensitivity with the exam and other alternatives  -he is not on any antiplatelets or anticoagulants  2  GERD-likely has underlying hiatal hernia or decreased LES pressure  Given chronicity of symptoms, has had symptoms for at least 5 years, will schedule EGD to assess for Laureano's esophagus  -will continue omeprazole 40 mg on daily basis at this time  Patient is instructed to take PPI 30 minutes before dinner given symptoms are predominantly at nighttime   - Patient was explained about the lifestyle and dietary modifications  Advised to avoid fatty foods, chocolates, caffeine, alcohol and any other triggering foods  Avoid eating for at least 3 hours before going to bed  -hemoglobin 16 2, platelets are 942, LFTs are normal   TSH was 4 7      3  Loose stools-likely IBS versus less likely malabsorption versus less likely inflammatory bowel disease   -will biopsy for celiac disease at the time of the EGD  -will assess for IBD at the time of the colonoscopy   -biopsy for microscopic colitis       ______________________________________________________________________    Reason for Consult / Principal Problem: [unfilled]    HPI: Tamy Newton is a 53y o  year old male with history of hypothyroidism, anxiety, hyperlipidemia, longstanding symptoms of GERD, presents for colon cancer screening evaluation  Patient states that he has never undergone a colonoscopy  He does endorse occasional symptoms of loose stools which are typically associated with intake of beer and stress  Denies unintentional weight loss, hematochezia, melena, dysphagia, loss of appetite or nausea and vomiting  Denies any recent travel, no sick contacts, or antibiotic use  He states that he also has a daily acid reflux symptoms which are well controlled with omeprazole 40 mg daily  Patient states that he has taken omeprazole over-the-counter in the past but over the past 1 year has been on daily dose which he takes at nighttime  Denies family history of colon cancer  No previous colonoscopy  Patient is a former smoker and quit smoking   He drinks socially  Review of Systems: The remainder of the review of systems was negative except for the pertinent positives noted in HPI       Historical Information   Past Medical History:   Diagnosis Date    Pulmonary embolism ([address] )     last assessed 1/10/2013    Snoring 2016     Past Surgical History:   Procedure Laterality Date    TONSILLECTOMY AND ADENOIDECTOMY       Social History   Social History     Substance and Sexual Activity   Alcohol Use Yes    Comment: occ     Social History     Substance and Sexual Activity   Drug Use No     Social History     Tobacco Use   Smoking Status Former Smoker    Last attempt to quit:     Years since quittin 8   Smokeless Tobacco Never Used Family History   Problem Relation Age of Onset   Randee Molina Diabetes Mother         DM    Hypothyroidism Mother    [facility] Glaucoma Mother     Hypertension Mother     Alcohol abuse Sister     Drug abuse Sister     Hypothyroidism Sister     Lung cancer Maternal Grandmother        Meds/Allergies       (Not in a hospital admission)  No current facility-administered medications for this visit  Allergies   Allergen Reactions    Esomeprazole        Objective     Blood pressure 130/80, pulse 68, temperature 98 °F (36 7 °C), temperature source Tympanic, resp  rate 14, height 5' 10" (1 778 m), weight 107 kg (236 lb 6 4 oz)  [unfilled]    PHYSICAL EXAM     GEN: well nourished, well developed, no acute distress  HEENT: anicteric, MMM, no cervical or supraclavicular lymphadenopathy  CV: RRR, no m/r/g  CHEST: CTA b/l, no WRR  ABD: +BS, soft, NT/ND, no hepatosplenomegaly  EXT: no c/c/e  SKIN: no rashes,  NEURO: aaox3    Lab Results:   No visits with results within 1 Day(s) from this visit     Latest known visit with results is:   Appointment on 08/21/2019   Component Date Value    WBC 08/21/2019 4 83     RBC 08/21/2019 5 90*    Hemoglobin 08/21/2019 16 2     Hematocrit 08/21/2019 49 9*    MCV 08/21/2019 85     MCH 08/21/2019 27 5     MCHC 08/21/2019 32 5     RDW 08/21/2019 16 7*    Platelets 61/63/2374 354     MPV 08/21/2019 8 5*    Sodium 08/21/2019 140     Potassium 08/21/2019 4 3     Chloride 08/21/2019 103     CO2 08/21/2019 28     ANION GAP 08/21/2019 9     BUN 08/21/2019 23     Creatinine 08/21/2019 1 07     Glucose, Fasting 08/21/2019 97     Calcium 08/21/2019 9 1     AST 08/21/2019 40     ALT 08/21/2019 51     Alkaline Phosphatase 08/21/2019 60     Total Protein 08/21/2019 8 1     Albumin 08/21/2019 3 9     Total Bilirubin 08/21/2019 0 65     eGFR 08/21/2019 78     Cholesterol 08/21/2019 411*    Triglycerides 08/21/2019 191*    HDL, Direct 08/21/2019 11*    LDL Calculated 08/21/2019 362*  TSH 3RD GENERATON 08/21/2019 4 700*    PSA 08/21/2019 0 9     Hepatitis C Ab 08/21/2019 Non-reactive     Hemoglobin A1C 08/21/2019 5 8     EAG 08/21/2019 120     Free T4 08/21/2019 0 92      Imaging Studies: I have personally reviewed pertinent films in PACS

## 2019-12-04 DIAGNOSIS — E78.2 MIXED HYPERLIPIDEMIA: ICD-10-CM

## 2019-12-04 RX ORDER — ATORVASTATIN CALCIUM 10 MG/1
10 TABLET, FILM COATED ORAL DAILY
Qty: 90 TABLET | Refills: 3 | Status: SHIPPED | OUTPATIENT
Start: 2019-12-04 | End: 2020-03-09 | Stop reason: SDUPTHER

## 2019-12-04 NOTE — TELEPHONE ENCOUNTER
Medication:  atorvastatin  Dosage: 10mg  How Often: 1 tab daily  Quantity:  90  Last Office Visit:   08/22/2019  Next Office Visit:   02/12/2020  Last refilled:  08/22/  How many pills left:    Pharmacy:   [facility] #[id number], [address] [facility], [place], [address]  Phone: [phone] Fax: [phone] had to cancel his appt on 12/19/19, however, he will keep his Physical on 02/12/20 & will get his labwork done soon    Thank you!

## 2019-12-18 ENCOUNTER — ANESTHESIA (OUTPATIENT)
Dept: GASTROENTEROLOGY | Facility: AMBULARY SURGERY CENTER | Age: 54
End: 2019-12-18

## 2019-12-18 ENCOUNTER — ANESTHESIA EVENT (OUTPATIENT)
Dept: GASTROENTEROLOGY | Facility: AMBULARY SURGERY CENTER | Age: 54
End: 2019-12-18

## 2019-12-18 ENCOUNTER — HOSPITAL ENCOUNTER (OUTPATIENT)
Dept: GASTROENTEROLOGY | Facility: AMBULARY SURGERY CENTER | Age: 54
Setting detail: OUTPATIENT SURGERY
Discharge: HOME | End: 2019-12-18
Attending: INTERNAL MEDICINE | Admitting: INTERNAL MEDICINE
Payer: COMMERCIAL

## 2019-12-18 VITALS
OXYGEN SATURATION: 95 % | SYSTOLIC BLOOD PRESSURE: 141 MMHG | RESPIRATION RATE: 18 BRPM | WEIGHT: 223 LBS | BODY MASS INDEX: 31.22 KG/M2 | HEIGHT: 71 IN | HEART RATE: 60 BPM | DIASTOLIC BLOOD PRESSURE: 80 MMHG | TEMPERATURE: 96.9 F

## 2019-12-18 DIAGNOSIS — Z12.11 SCREENING FOR COLON CANCER: ICD-10-CM

## 2019-12-18 DIAGNOSIS — R12 HEARTBURN: ICD-10-CM

## 2019-12-18 DIAGNOSIS — K21.9 GASTROESOPHAGEAL REFLUX DISEASE WITHOUT ESOPHAGITIS: ICD-10-CM

## 2019-12-18 PROCEDURE — 88342 IMHCHEM/IMCYTCHM 1ST ANTB: CPT | Performed by: PATHOLOGY

## 2019-12-18 PROCEDURE — 88341 IMHCHEM/IMCYTCHM EA ADD ANTB: CPT | Performed by: PATHOLOGY

## 2019-12-18 PROCEDURE — 88305 TISSUE EXAM BY PATHOLOGIST: CPT | Performed by: PATHOLOGY

## 2019-12-18 PROCEDURE — 43239 EGD BIOPSY SINGLE/MULTIPLE: CPT | Performed by: INTERNAL MEDICINE

## 2019-12-18 PROCEDURE — 45385 COLONOSCOPY W/LESION REMOVAL: CPT | Performed by: INTERNAL MEDICINE

## 2019-12-18 RX ORDER — PROPOFOL 10 MG/ML
INJECTION, EMULSION INTRAVENOUS AS NEEDED
Status: DISCONTINUED | OUTPATIENT
Start: 2019-12-18 | End: 2019-12-18 | Stop reason: SURG

## 2019-12-18 RX ORDER — SODIUM CHLORIDE, SODIUM LACTATE, POTASSIUM CHLORIDE, CALCIUM CHLORIDE 600; 310; 30; 20 MG/100ML; MG/100ML; MG/100ML; MG/100ML
125 INJECTION, SOLUTION INTRAVENOUS CONTINUOUS
Status: DISCONTINUED | OUTPATIENT
Start: 2019-12-18 | End: 2019-12-22 | Stop reason: HOSPADM

## 2019-12-18 RX ADMIN — PROPOFOL 50 MG: 10 INJECTION, EMULSION INTRAVENOUS at 11:38

## 2019-12-18 RX ADMIN — PROPOFOL 200 MG: 10 INJECTION, EMULSION INTRAVENOUS at 11:13

## 2019-12-18 RX ADMIN — Medication 40 MG: at 11:48

## 2019-12-18 RX ADMIN — SODIUM CHLORIDE, SODIUM LACTATE, POTASSIUM CHLORIDE, AND CALCIUM CHLORIDE: .6; .31; .03; .02 INJECTION, SOLUTION INTRAVENOUS at 11:10

## 2019-12-18 RX ADMIN — PROPOFOL 50 MG: 10 INJECTION, EMULSION INTRAVENOUS at 11:45

## 2019-12-18 RX ADMIN — PROPOFOL 50 MG: 10 INJECTION, EMULSION INTRAVENOUS at 11:23

## 2019-12-18 RX ADMIN — PROPOFOL 50 MG: 10 INJECTION, EMULSION INTRAVENOUS at 11:41

## 2019-12-18 RX ADMIN — PROPOFOL 50 MG: 10 INJECTION, EMULSION INTRAVENOUS at 11:47

## 2019-12-18 RX ADMIN — SODIUM CHLORIDE, SODIUM LACTATE, POTASSIUM CHLORIDE, AND CALCIUM CHLORIDE 125 ML/HR: .6; .31; .03; .02 INJECTION, SOLUTION INTRAVENOUS at 10:50

## 2019-12-18 RX ADMIN — PROPOFOL 30 MG: 10 INJECTION, EMULSION INTRAVENOUS at 11:21

## 2019-12-18 RX ADMIN — PROPOFOL 50 MG: 10 INJECTION, EMULSION INTRAVENOUS at 11:29

## 2019-12-18 RX ADMIN — PROPOFOL 50 MG: 10 INJECTION, EMULSION INTRAVENOUS at 11:27

## 2019-12-18 RX ADMIN — PROPOFOL 20 MG: 10 INJECTION, EMULSION INTRAVENOUS at 11:43

## 2019-12-18 RX ADMIN — PROPOFOL 50 MG: 10 INJECTION, EMULSION INTRAVENOUS at 11:35

## 2019-12-18 RX ADMIN — PROPOFOL 50 MG: 10 INJECTION, EMULSION INTRAVENOUS at 11:17

## 2019-12-18 RX ADMIN — PROPOFOL 50 MG: 10 INJECTION, EMULSION INTRAVENOUS at 11:32

## 2019-12-18 RX ADMIN — PROPOFOL 50 MG: 10 INJECTION, EMULSION INTRAVENOUS at 11:15

## 2019-12-18 NOTE — ANESTHESIA PREPROCEDURE EVALUATION
Review of Systems/Medical History  Patient summary reviewed    No history of anesthetic complications     Cardiovascular  Exercise tolerance (METS): >4,  Hyperlipidemia, No angina ,    Pulmonary  Smoker ex-smoker  , No shortness of breath, No recent URI ,        GI/Hepatic    GERD well controlled,        Negative  ROS        Endo/Other  History of thyroid disease , hypothyroidism,      GYN       Hematology   Musculoskeletal  Negative musculoskeletal ROS        Neurology  Negative neurology ROS      Psychology   Anxiety,              Physical Exam    Airway    Mallampati score: II         Dental   No notable dental hx     Cardiovascular      Pulmonary      Other Findings        Anesthesia Plan  ASA Score- 2     Anesthesia Type- IV sedation with anesthesia with ASA Monitors  Additional Monitors:   Airway Plan:         Plan Factors-    Induction- intravenous  Postoperative Plan-     Informed Consent- Anesthetic plan and risks discussed with patient  I personally reviewed this patient with the CRNA  Discussed and agreed on the Anesthesia Plan with the CRNA  Teo Riley

## 2019-12-18 NOTE — H&P
History and Physical -  [facility]  Dellis Scheuermann 53 y o  male MRN: [id number]    HPI: Dellis Scheuermann is a 53y o  year old male who presents for evaluation of colon cancer screening GERD symptoms  Review of Systems    Historical Information   Past Medical History:   Diagnosis Date    GERD (gastroesophageal reflux disease)     Pulmonary embolism ([address] )     last assessed 1/10/2013    Snoring 2016     Past Surgical History:   Procedure Laterality Date    TONSILLECTOMY AND ADENOIDECTOMY       Social History   Social History     Substance and Sexual Activity   Alcohol Use Yes    Comment: occ     Social History     Substance and Sexual Activity   Drug Use No     Social History     Tobacco Use   Smoking Status Former Smoker    Last attempt to quit:     Years since quittin 9   Smokeless Tobacco Never Used     Family History   Problem Relation Age of Onset    Diabetes Mother         DM    Hypothyroidism Mother    [facility] Glaucoma Mother     Hypertension Mother     Alcohol abuse Sister     Drug abuse Sister     Hypothyroidism Sister     Lung cancer Maternal Grandmother        Meds/Allergies       (Not in a hospital admission)    Allergies   Allergen Reactions    Esomeprazole        Objective     /87   Pulse 59   Temp (!) 97 3 °F (36 3 °C) (Temporal)   Resp 16   Ht 5' 11" (1 803 m)   Wt 101 kg (223 lb)   SpO2 97%   BMI 31 10 kg/m²       PHYSICAL EXAM    Gen: NAD  CV: RRR  CHEST: Clear  ABD: soft, NT/ND  EXT: no edema  Neuro: AAO      ASSESSMENT/PLAN:  This is a 53y o  year old male here for GERD symptoms and evaluation of colon cancer screening  PLAN:   Procedure:  Colonoscopy and EGD

## 2019-12-18 NOTE — ANESTHESIA POSTPROCEDURE EVALUATION
Post-Op Assessment Note    CV Status:  Stable  Pain Score: 0    Pain management: adequate     Mental Status:  Alert and awake   Hydration Status:  Euvolemic   PONV Controlled:  Controlled   Airway Patency:  Patent   Post Op Vitals Reviewed: Yes      Staff: AnesthesiologistAMERICO           /94 (12/18/19 1159)    Temp (!) 96 9 °F (36 1 °C) (12/18/19 1159)    Pulse 60 (12/18/19 1159)   Resp 18 (12/18/19 1159)    SpO2 95 % (12/18/19 1159)

## 2020-01-06 ENCOUNTER — TELEPHONE (OUTPATIENT)
Dept: GASTROENTEROLOGY | Facility: CLINIC | Age: 55
End: 2020-01-06

## 2020-01-06 NOTE — TELEPHONE ENCOUNTER
Recall for EGD set   Recall for Colon and HM set     Patient unable to go over results will go over when he's not driving

## 2020-01-06 NOTE — TELEPHONE ENCOUNTER
----- Message from Megan Terry MD sent at 1/3/2020  6:20 PM EST -----  Please inform the patient that the duodenal biopsies are negative for celiac disease, no evidence of H pylori organism on gastric biopsies  Esophageal biopsies show chronic inflammation, while there is no evidence of Laureano's on esophageal biopsies, given endoscopic appearance , would recommend omeprazole 40 mg on daily basis  Would recommend repeat EGD in 2 years  Colonoscopy was negative for microscopic colitis  Colon polyp was benign    Would recommend repeat colonoscopy in 5 years

## 2020-02-13 DIAGNOSIS — F41.9 ANXIETY: ICD-10-CM

## 2020-02-14 RX ORDER — ALPRAZOLAM 0.25 MG/1
TABLET ORAL
Qty: 45 TABLET | Refills: 3 | Status: SHIPPED | OUTPATIENT
Start: 2020-02-14 | End: 2020-08-24

## 2020-03-09 ENCOUNTER — OFFICE VISIT (OUTPATIENT)
Dept: FAMILY MEDICINE CLINIC | Facility: CLINIC | Age: 55
End: 2020-03-09
Payer: COMMERCIAL

## 2020-03-09 ENCOUNTER — TRANSCRIBE ORDERS (OUTPATIENT)
Dept: ADMINISTRATIVE | Age: 55
End: 2020-03-09

## 2020-03-09 ENCOUNTER — APPOINTMENT (OUTPATIENT)
Dept: LAB | Age: 55
End: 2020-03-09
Payer: COMMERCIAL

## 2020-03-09 VITALS
WEIGHT: 240.8 LBS | HEART RATE: 86 BPM | BODY MASS INDEX: 33.71 KG/M2 | OXYGEN SATURATION: 96 % | DIASTOLIC BLOOD PRESSURE: 70 MMHG | TEMPERATURE: 98.4 F | SYSTOLIC BLOOD PRESSURE: 120 MMHG | HEIGHT: 71 IN | RESPIRATION RATE: 16 BRPM

## 2020-03-09 DIAGNOSIS — R73.09 ABNORMAL GLUCOSE: ICD-10-CM

## 2020-03-09 DIAGNOSIS — E78.2 MIXED HYPERLIPIDEMIA: ICD-10-CM

## 2020-03-09 DIAGNOSIS — E03.9 ACQUIRED HYPOTHYROIDISM: ICD-10-CM

## 2020-03-09 DIAGNOSIS — E03.9 HYPOTHYROIDISM, UNSPECIFIED TYPE: ICD-10-CM

## 2020-03-09 DIAGNOSIS — Z00.00 ANNUAL PHYSICAL EXAM: Primary | ICD-10-CM

## 2020-03-09 LAB
ALBUMIN SERPL BCP-MCNC: 3.5 G/DL (ref 3.5–5)
ALP SERPL-CCNC: 62 U/L (ref 46–116)
ALT SERPL W P-5'-P-CCNC: 52 U/L (ref 12–78)
ANION GAP SERPL CALCULATED.3IONS-SCNC: 7 MMOL/L (ref 4–13)
AST SERPL W P-5'-P-CCNC: 30 U/L (ref 5–45)
BILIRUB SERPL-MCNC: 0.64 MG/DL (ref 0.2–1)
BUN SERPL-MCNC: 17 MG/DL (ref 5–25)
CALCIUM SERPL-MCNC: 8.8 MG/DL (ref 8.3–10.1)
CHLORIDE SERPL-SCNC: 105 MMOL/L (ref 100–108)
CHOLEST SERPL-MCNC: 222 MG/DL (ref 50–200)
CO2 SERPL-SCNC: 27 MMOL/L (ref 21–32)
CREAT SERPL-MCNC: 1.04 MG/DL (ref 0.6–1.3)
GFR SERPL CREATININE-BSD FRML MDRD: 80 ML/MIN/1.73SQ M
GLUCOSE P FAST SERPL-MCNC: 110 MG/DL (ref 65–99)
HDLC SERPL-MCNC: 9 MG/DL
LDLC SERPL CALC-MCNC: 185 MG/DL (ref 0–100)
POTASSIUM SERPL-SCNC: 4 MMOL/L (ref 3.5–5.3)
PROT SERPL-MCNC: 7.4 G/DL (ref 6.4–8.2)
SODIUM SERPL-SCNC: 139 MMOL/L (ref 136–145)
TRIGL SERPL-MCNC: 139 MG/DL
TSH SERPL DL<=0.05 MIU/L-ACNC: 0.67 UIU/ML (ref 0.36–3.74)

## 2020-03-09 PROCEDURE — 36415 COLL VENOUS BLD VENIPUNCTURE: CPT

## 2020-03-09 PROCEDURE — 84443 ASSAY THYROID STIM HORMONE: CPT

## 2020-03-09 PROCEDURE — 80053 COMPREHEN METABOLIC PANEL: CPT

## 2020-03-09 PROCEDURE — 80061 LIPID PANEL: CPT

## 2020-03-09 PROCEDURE — 99396 PREV VISIT EST AGE 40-64: CPT | Performed by: FAMILY MEDICINE

## 2020-03-09 RX ORDER — ATORVASTATIN CALCIUM 20 MG/1
20 TABLET, FILM COATED ORAL DAILY
Qty: 90 TABLET | Refills: 3 | Status: SHIPPED | OUTPATIENT
Start: 2020-03-09 | End: 2020-07-13 | Stop reason: ALTCHOICE

## 2020-03-09 NOTE — PROGRESS NOTES
ADULT ANNUAL [address]    NAME: Ilia Richey  AGE: 63 y o  SEX: male  : 1965     DATE: 3/9/2020     Assessment and Plan:     Problem List Items Addressed This Visit        Endocrine    Hypothyroidism     Stable on levo  Overall in good range         Relevant Orders    TSH, 3rd generation with Free T4 reflex       Other    Abnormal glucose     Check a1c          Relevant Orders    Hemoglobin A1C    Hyperlipidemia     Increase lipitor to 20mg   Recheck lipids in 4 months         Relevant Medications    atorvastatin (LIPITOR) 20 mg tablet    Other Relevant Orders    Comprehensive metabolic panel    Lipid Panel with Direct LDL reflex      Other Visit Diagnoses     Annual physical exam    -  Primary          Immunizations and preventive care screenings were discussed with patient today  Appropriate education was printed on patient's after visit summary  Counseling:  · Dental Health: discussed importance of regular tooth brushing, flossing, and dental visits  BMI Counseling: Body mass index is 33 58 kg/m²  The BMI is above normal  Nutrition recommendations include encouraging healthy choices of fruits and vegetables  Exercise recommendations include exercising 3-5 times per week  Return in 1 year (on 3/9/2021)  Chief Complaint:     Chief Complaint   Patient presents with    Annual Exam     Patient here for annual wellness exam      History of Present Illness:     Adult Annual Physical   Patient here for a comprehensive physical exam  The patient reports problems - didn't work out since injured arm- now better  Diet and Physical Activity  · Diet/Nutrition: well balanced diet  · Exercise: no formal exercise  Depression Screening  PHQ-9 Depression Screening    PHQ-9:    Frequency of the following problems over the past two weeks:            General Health  · Sleep: sleeps well     · Hearing: normal - bilateral   · Vision: no vision problems and most recent eye exam <1 year ago  · Dental: regular dental visits and brushes teeth twice daily   Health  · Symptoms include: none     Review of Systems:     Review of Systems   Constitutional: Negative  HENT: Negative  Eyes: Negative  Respiratory: Negative  Cardiovascular: Negative  Gastrointestinal: Positive for abdominal pain  Endocrine: Negative  Genitourinary: Negative  Musculoskeletal: Positive for arthralgias (knee pain)  Allergic/Immunologic: Negative  Neurological: Negative  Hematological: Negative  Psychiatric/Behavioral: The patient is nervous/anxious         Past Medical History:     Past Medical History:   Diagnosis Date    GERD (gastroesophageal reflux disease)     Pulmonary embolism ([facility] [address] )     last assessed 1/10/2013    Snoring 2016      Past Surgical History:     Past Surgical History:   Procedure Laterality Date    TONSILLECTOMY AND ADENOIDECTOMY        Family History:     Family History   Problem Relation Age of Onset   Valaria Reil Diabetes Mother         DM    Hypothyroidism Mother    Valaria Reil Glaucoma Mother     Hypertension Mother     Alcohol abuse Sister     Drug abuse Sister    Valaria Reil Hypothyroidism Sister     Lung cancer Maternal Grandmother       Social History:        Social History     Socioeconomic History    Marital status: /Civil Union     Spouse name: Not on file    Number of children: 3    Years of education: Not on file    Highest education level: Not on file   Occupational History    Occupation:      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    Social Needs    Financial resource strain: Not on file    Food insecurity:     Worry: Not on file     Inability: Not on file    Transportation needs:     Medical: Not on file     Non-medical: Not on file   Tobacco Use    Smoking status: Former Smoker     Last attempt to quit:      Years since quittin 1    Smokeless tobacco: Never Used   Substance and Sexual Activity    Alcohol use: Yes     Comment: occ    Drug use: No    Sexual activity: Yes     Partners: Female   Lifestyle    Physical activity:     Days per week: Not on file     Minutes per session: Not on file    Stress: Not on file   Relationships    Social connections:     Talks on phone: Not on file     Gets together: Not on file     Attends Spiritism service: Not on file     Active member of club or organization: Not on file     Attends meetings of clubs or organizations: Not on file     Relationship status: Not on file    Intimate partner violence:     Fear of current or ex partner: Not on file     Emotionally abused: Not on file     Physically abused: Not on file     Forced sexual activity: Not on file   Other Topics Concern    Not on file   Social History Narrative    Recreational activities martial arts       Current Medications:     Current Outpatient Medications   Medication Sig Dispense Refill    ALPRAZolam (XANAX) 0 25 mg tablet Take one and half tablets by mouth before bedtime 45 tablet 3    aspirin 81 MG tablet Take 1 tablet by mouth daily      atorvastatin (LIPITOR) 20 mg tablet Take 1 tablet (20 mg total) by mouth daily 90 tablet 3    levothyroxine 112 mcg tablet Take 1 tablet (112 mcg total) by mouth daily in the early morning 90 tablet 3    Multiple Vitamins-Minerals (CENTRUM SILVER 50+MEN PO) Take 1 tablet by mouth daily      omeprazole (PriLOSEC) 40 MG capsule Take 1 capsule (40 mg total) by mouth daily 90 capsule 3     No current facility-administered medications for this visit  Allergies: Allergies   Allergen Reactions    Esomeprazole       Physical Exam:     /70   Pulse 86   Temp 98 4 °F (36 9 °C)   Resp 16   Ht 5' 11" (1 803 m)   Wt 109 kg (240 lb 12 8 oz)   SpO2 96%   BMI 33 58 kg/m²     Physical Exam   Constitutional: He is oriented to person, place, and time   Vital signs are normal  He appears well-developed and well-nourished  HENT:   Head: Normocephalic and atraumatic  Right Ear: External ear normal    Left Ear: External ear normal    Nose: Nose normal    Mouth/Throat: Oropharynx is clear and moist    Eyes: Pupils are equal, round, and reactive to light  Conjunctivae, EOM and lids are normal    Neck: Normal range of motion  Neck supple  Cardiovascular: Normal rate, regular rhythm, S1 normal, S2 normal, normal heart sounds, intact distal pulses and normal pulses  Pulmonary/Chest: Effort normal and breath sounds normal    Abdominal: Soft  Normal appearance and bowel sounds are normal    Musculoskeletal: Normal range of motion  Neurological: He is alert and oriented to person, place, and time  He has normal strength and normal reflexes  Skin: Skin is warm and dry  Psychiatric: He has a normal mood and affect  His speech is normal and behavior is normal  Judgment and thought content normal  Cognition and memory are normal    Nursing note and vitals reviewed        Dre Cote DO  [id number] [facility]

## 2020-03-09 NOTE — PATIENT INSTRUCTIONS

## 2020-07-10 ENCOUNTER — APPOINTMENT (OUTPATIENT)
Dept: LAB | Age: 55
End: 2020-07-10
Payer: COMMERCIAL

## 2020-07-10 DIAGNOSIS — E78.2 MIXED HYPERLIPIDEMIA: ICD-10-CM

## 2020-07-10 DIAGNOSIS — E03.9 ACQUIRED HYPOTHYROIDISM: ICD-10-CM

## 2020-07-10 DIAGNOSIS — R73.09 ABNORMAL GLUCOSE: ICD-10-CM

## 2020-07-10 LAB
ALBUMIN SERPL BCP-MCNC: 3.5 G/DL (ref 3.5–5)
ALP SERPL-CCNC: 63 U/L (ref 46–116)
ALT SERPL W P-5'-P-CCNC: 52 U/L (ref 12–78)
ANION GAP SERPL CALCULATED.3IONS-SCNC: 4 MMOL/L (ref 4–13)
AST SERPL W P-5'-P-CCNC: 52 U/L (ref 5–45)
BILIRUB SERPL-MCNC: 0.95 MG/DL (ref 0.2–1)
BUN SERPL-MCNC: 22 MG/DL (ref 5–25)
CALCIUM SERPL-MCNC: 9.4 MG/DL (ref 8.3–10.1)
CHLORIDE SERPL-SCNC: 105 MMOL/L (ref 100–108)
CHOLEST SERPL-MCNC: 241 MG/DL (ref 50–200)
CO2 SERPL-SCNC: 28 MMOL/L (ref 21–32)
CREAT SERPL-MCNC: 1.09 MG/DL (ref 0.6–1.3)
EST. AVERAGE GLUCOSE BLD GHB EST-MCNC: 126 MG/DL
GFR SERPL CREATININE-BSD FRML MDRD: 75 ML/MIN/1.73SQ M
GLUCOSE P FAST SERPL-MCNC: 86 MG/DL (ref 65–99)
HBA1C MFR BLD: 6 %
HDLC SERPL-MCNC: 7 MG/DL
LDLC SERPL CALC-MCNC: 198 MG/DL (ref 0–100)
POTASSIUM SERPL-SCNC: 4.3 MMOL/L (ref 3.5–5.3)
PROT SERPL-MCNC: 7.9 G/DL (ref 6.4–8.2)
SODIUM SERPL-SCNC: 137 MMOL/L (ref 136–145)
TRIGL SERPL-MCNC: 181 MG/DL
TSH SERPL DL<=0.05 MIU/L-ACNC: 0.43 UIU/ML (ref 0.36–3.74)

## 2020-07-10 PROCEDURE — 80053 COMPREHEN METABOLIC PANEL: CPT

## 2020-07-10 PROCEDURE — 80061 LIPID PANEL: CPT

## 2020-07-10 PROCEDURE — 83036 HEMOGLOBIN GLYCOSYLATED A1C: CPT

## 2020-07-10 PROCEDURE — 36415 COLL VENOUS BLD VENIPUNCTURE: CPT

## 2020-07-10 PROCEDURE — 84443 ASSAY THYROID STIM HORMONE: CPT

## 2020-07-13 ENCOUNTER — OFFICE VISIT (OUTPATIENT)
Dept: FAMILY MEDICINE CLINIC | Facility: CLINIC | Age: 55
End: 2020-07-13
Payer: COMMERCIAL

## 2020-07-13 VITALS
HEART RATE: 78 BPM | RESPIRATION RATE: 12 BRPM | TEMPERATURE: 98.8 F | DIASTOLIC BLOOD PRESSURE: 82 MMHG | WEIGHT: 231.6 LBS | OXYGEN SATURATION: 98 % | HEIGHT: 70 IN | BODY MASS INDEX: 33.16 KG/M2 | SYSTOLIC BLOOD PRESSURE: 138 MMHG

## 2020-07-13 DIAGNOSIS — E78.2 MIXED HYPERLIPIDEMIA: Primary | ICD-10-CM

## 2020-07-13 DIAGNOSIS — R73.09 ABNORMAL GLUCOSE: ICD-10-CM

## 2020-07-13 DIAGNOSIS — E03.9 ACQUIRED HYPOTHYROIDISM: ICD-10-CM

## 2020-07-13 PROCEDURE — 99214 OFFICE O/P EST MOD 30 MIN: CPT | Performed by: FAMILY MEDICINE

## 2020-07-13 PROCEDURE — 3008F BODY MASS INDEX DOCD: CPT | Performed by: FAMILY MEDICINE

## 2020-07-13 PROCEDURE — 1036F TOBACCO NON-USER: CPT | Performed by: FAMILY MEDICINE

## 2020-07-13 RX ORDER — ROSUVASTATIN CALCIUM 10 MG/1
10 TABLET, COATED ORAL DAILY
Qty: 90 TABLET | Refills: 3 | Status: SHIPPED | OUTPATIENT
Start: 2020-07-13 | End: 2021-07-08

## 2020-07-13 NOTE — PROGRESS NOTES
Assessment/Plan:    1  Mixed hyperlipidemia  Assessment & Plan:  Worse levels  Side effects  Switch to crestor  rechec in 4 months  Add coQ10    Orders:  -     rosuvastatin (CRESTOR) 10 MG tablet; Take 1 tablet (10 mg total) by mouth daily  -     Comprehensive metabolic panel; Future; Expected date: 11/09/2020  -     Hemoglobin A1C; Future; Expected date: 11/09/2020  -     Lipid Panel with Direct LDL reflex; Future; Expected date: 11/09/2020    2  Acquired hypothyroidism  -     TSH, 3rd generation with Free T4 reflex; Future; Expected date: 11/09/2020    3  Abnormal glucose  Assessment & Plan:  Check a1c in 4 months            There are no Patient Instructions on file for this visit  Return in about 4 months (around 11/13/2020)  Subjective:      Patient ID: Peña Hardy is a 63 y o  male  Chief Complaint   Patient presents with    Follow-up     4 month f/u       Here for follow up  Overall feeling good  Has lost weight  Joints have been hurting since starting lipitor      The following portions of the patient's history were reviewed and updated as appropriate: allergies, current medications, past family history, past medical history, past social history, past surgical history and problem list     Review of Systems   Constitutional: Negative  HENT: Negative  Eyes: Negative  Respiratory: Negative  Cardiovascular: Negative  Gastrointestinal: Negative  Endocrine: Negative  Genitourinary: Negative  Musculoskeletal: Positive for myalgias  Skin: Negative  Allergic/Immunologic: Negative  Neurological: Negative  Hematological: Negative  Psychiatric/Behavioral: Negative            Current Outpatient Medications   Medication Sig Dispense Refill    ALPRAZolam (XANAX) 0 25 mg tablet Take one and half tablets by mouth before bedtime 45 tablet 3    aspirin 81 MG tablet Take 1 tablet by mouth daily      levothyroxine 112 mcg tablet Take 1 tablet (112 mcg total) by mouth daily in the early morning 90 tablet 3    Multiple Vitamins-Minerals (CENTRUM SILVER 50+MEN PO) Take 1 tablet by mouth daily      omeprazole (PriLOSEC) 40 MG capsule Take 1 capsule (40 mg total) by mouth daily 90 capsule 3    rosuvastatin (CRESTOR) 10 MG tablet Take 1 tablet (10 mg total) by mouth daily 90 tablet 3     No current facility-administered medications for this visit  Objective:    /82   Pulse 78   Temp 98 8 °F (37 1 °C) (Tympanic)   Resp 12   Ht 5' 10" (1 778 m)   Wt 105 kg (231 lb 9 6 oz)   SpO2 98%   BMI 33 23 kg/m²        Physical Exam   Constitutional: He is oriented to person, place, and time  He appears well-developed and well-nourished  HENT:   Head: Normocephalic and atraumatic  Eyes: Pupils are equal, round, and reactive to light  EOM are normal    Neck: Normal range of motion  Neck supple  Cardiovascular: Normal rate, regular rhythm, normal heart sounds and intact distal pulses  Pulmonary/Chest: Effort normal and breath sounds normal    Abdominal: Soft  Bowel sounds are normal    Musculoskeletal: Normal range of motion  Neurological: He is alert and oriented to person, place, and time  Skin: Skin is warm  Capillary refill takes less than 2 seconds  Psychiatric: He has a normal mood and affect  His behavior is normal  Judgment and thought content normal    Nursing note and vitals reviewed               Judd Zapien DO

## 2020-08-23 DIAGNOSIS — R12 HEARTBURN: ICD-10-CM

## 2020-08-23 DIAGNOSIS — F41.9 ANXIETY: ICD-10-CM

## 2020-08-24 RX ORDER — OMEPRAZOLE 40 MG/1
40 CAPSULE, DELAYED RELEASE ORAL DAILY
Qty: 90 CAPSULE | Refills: 3 | Status: SHIPPED | OUTPATIENT
Start: 2020-08-24 | End: 2021-09-08

## 2020-08-24 RX ORDER — ALPRAZOLAM 0.25 MG/1
TABLET ORAL
Qty: 45 TABLET | Refills: 0 | Status: SHIPPED | OUTPATIENT
Start: 2020-08-24 | End: 2020-10-19 | Stop reason: SDUPTHER

## 2020-08-24 NOTE — TELEPHONE ENCOUNTER
Medication:Alprazolam  PDMP   06/19/2020  1  02/14/2020  ALPRAZOLAM 0 25 MG TABLET  45 0  30  LO CIM        Active agreement on file -No

## 2020-10-19 DIAGNOSIS — F41.9 ANXIETY: ICD-10-CM

## 2020-10-19 RX ORDER — ALPRAZOLAM 0.25 MG/1
TABLET ORAL
Qty: 45 TABLET | Refills: 0 | Status: SHIPPED | OUTPATIENT
Start: 2020-10-19 | End: 2020-12-08 | Stop reason: SDUPTHER

## 2020-10-21 ENCOUNTER — IMMUNIZATIONS (OUTPATIENT)
Dept: FAMILY MEDICINE CLINIC | Facility: CLINIC | Age: 55
End: 2020-10-21
Payer: COMMERCIAL

## 2020-10-21 DIAGNOSIS — Z23 ENCOUNTER FOR IMMUNIZATION: ICD-10-CM

## 2020-10-21 PROCEDURE — 90471 IMMUNIZATION ADMIN: CPT

## 2020-10-21 PROCEDURE — 90682 RIV4 VACC RECOMBINANT DNA IM: CPT

## 2020-11-16 ENCOUNTER — OFFICE VISIT (OUTPATIENT)
Dept: FAMILY MEDICINE CLINIC | Facility: CLINIC | Age: 55
End: 2020-11-16
Payer: COMMERCIAL

## 2020-11-16 VITALS
BODY MASS INDEX: 33.41 KG/M2 | RESPIRATION RATE: 16 BRPM | SYSTOLIC BLOOD PRESSURE: 146 MMHG | DIASTOLIC BLOOD PRESSURE: 72 MMHG | WEIGHT: 233.4 LBS | TEMPERATURE: 97.6 F | HEIGHT: 70 IN | HEART RATE: 76 BPM | OXYGEN SATURATION: 96 %

## 2020-11-16 DIAGNOSIS — R73.09 ABNORMAL GLUCOSE: ICD-10-CM

## 2020-11-16 DIAGNOSIS — K21.9 GASTROESOPHAGEAL REFLUX DISEASE WITHOUT ESOPHAGITIS: ICD-10-CM

## 2020-11-16 DIAGNOSIS — E78.2 MIXED HYPERLIPIDEMIA: ICD-10-CM

## 2020-11-16 DIAGNOSIS — E03.9 ACQUIRED HYPOTHYROIDISM: Primary | ICD-10-CM

## 2020-11-16 PROCEDURE — 1036F TOBACCO NON-USER: CPT | Performed by: FAMILY MEDICINE

## 2020-11-16 PROCEDURE — 99214 OFFICE O/P EST MOD 30 MIN: CPT | Performed by: FAMILY MEDICINE

## 2020-11-16 PROCEDURE — 3008F BODY MASS INDEX DOCD: CPT | Performed by: FAMILY MEDICINE

## 2020-12-08 DIAGNOSIS — F41.9 ANXIETY: ICD-10-CM

## 2020-12-08 DIAGNOSIS — E03.9 HYPOTHYROIDISM, UNSPECIFIED TYPE: ICD-10-CM

## 2020-12-08 RX ORDER — ALPRAZOLAM 0.25 MG/1
TABLET ORAL
Qty: 45 TABLET | Refills: 3 | Status: SHIPPED | OUTPATIENT
Start: 2020-12-08 | End: 2021-06-03

## 2020-12-08 RX ORDER — LEVOTHYROXINE SODIUM 112 UG/1
112 TABLET ORAL
Qty: 90 TABLET | Refills: 3 | Status: SHIPPED | OUTPATIENT
Start: 2020-12-08 | End: 2022-01-23

## 2021-01-28 ENCOUNTER — OFFICE VISIT (OUTPATIENT)
Dept: FAMILY MEDICINE CLINIC | Facility: CLINIC | Age: 56
End: 2021-01-28
Payer: COMMERCIAL

## 2021-01-28 VITALS
SYSTOLIC BLOOD PRESSURE: 146 MMHG | OXYGEN SATURATION: 98 % | HEART RATE: 54 BPM | WEIGHT: 226 LBS | TEMPERATURE: 98.5 F | DIASTOLIC BLOOD PRESSURE: 76 MMHG | BODY MASS INDEX: 32.35 KG/M2 | RESPIRATION RATE: 16 BRPM | HEIGHT: 70 IN

## 2021-01-28 DIAGNOSIS — M25.512 ACUTE PAIN OF LEFT SHOULDER: ICD-10-CM

## 2021-01-28 DIAGNOSIS — M62.838 MUSCLE SPASM: Primary | ICD-10-CM

## 2021-01-28 PROCEDURE — 99213 OFFICE O/P EST LOW 20 MIN: CPT | Performed by: NURSE PRACTITIONER

## 2021-01-28 RX ORDER — CYCLOBENZAPRINE HCL 10 MG
10 TABLET ORAL 3 TIMES DAILY PRN
Qty: 30 TABLET | Refills: 0 | Status: SHIPPED | OUTPATIENT
Start: 2021-01-28

## 2021-01-28 NOTE — LETTER
January 28, 2021     Patient: Snow Rowell   YOB: 1965   Date of Visit: 1/28/2021       To Whom it May Concern:    Irais Rm is under my professional care  He was seen in my office on 1/28/2021  He may return to work on 1/29/21  Patient left work early today for doctors appoinment  If you have any questions or concerns, please don't hesitate to call           Sincerely,          MISSY Neumann        CC: No Recipients

## 2021-01-28 NOTE — PROGRESS NOTES
FAMILY PRACTICE OFFICE VISIT       NAME: Nidia Schilling  AGE: 63 y o  SEX: male       : 1965        MRN: [id number]    DATE: 2021  TIME: 4:53 PM    Assessment and Plan   1  Muscle spasm  -     cyclobenzaprine (FLEXERIL) 10 mg tablet; Take 1 tablet (10 mg total) by mouth 3 (three) times a day as needed for muscle spasms  -     XR shoulder 2+ vw left; Future; Expected date: 2021  -     Ambulatory referral to Orthopedic Surgery; Future    2  Acute pain of left shoulder  -     XR shoulder 2+ vw left; Future; Expected date: 2021  -     Ambulatory referral to Orthopedic Surgery; Future     cont ibuprofen prn for pain              Chief Complaint     Chief Complaint   Patient presents with    Shoulder Pain     Left neck and shoulder pain    Neck Pain       History of Present Illness   Carlos Steen is a 63y o -year-old male who is here due to left shoulder pain and left neck discomfort  Gets muscle spasm in left shoulder and neck and causes headache on left side of head and eye  Taking ibuprofen with some relief  Started one to two months ago  Not improving, worsening  Decreased rom left shoulder  Patient does exercise and lift weights, has not lifted weights in the past two weeks due to discomfort      Review of Systems   Review of Systems   Constitutional: Negative for fatigue and fever  HENT: Negative for congestion and postnasal drip  Respiratory: Negative for cough and shortness of breath  Cardiovascular: Negative for chest pain and palpitations  Gastrointestinal: Negative for constipation and diarrhea  Musculoskeletal: Positive for arthralgias and myalgias  Skin: Negative for rash  Neurological: Negative for dizziness and headaches  Hematological: Negative for adenopathy  Psychiatric/Behavioral: Negative for dysphoric mood and sleep disturbance  The patient is not nervous/anxious          Active Problem List     Patient Active Problem List   Diagnosis    Abnormal glucose    Anxiety disorder    GERD (gastroesophageal reflux disease)    Hyperlipidemia    Hypothyroidism    Insomnia    Seborrheic keratosis    Umbilical hernia    Well adult exam    Screening for cardiovascular condition    Screening for diabetes mellitus    Screening for colon cancer    Screening for prostate cancer    Muscle spasm    Acute pain of left shoulder         Past Medical History:  Past Medical History:   Diagnosis Date    GERD (gastroesophageal reflux disease)     Pulmonary embolism (HCC)     last assessed 1/10/2013    Snoring 2016       Past Surgical History:  Past Surgical History:   Procedure Laterality Date    TONSILLECTOMY AND ADENOIDECTOMY         Family History:  Family History   Problem Relation Age of Onset   [facility] Diabetes Mother         DM    Hypothyroidism Mother    [facility] Glaucoma Mother     Hypertension Mother     Alcohol abuse Sister     Drug abuse Sister    [facility] Hypothyroidism Sister     Lung cancer Maternal Grandmother        Social History:  Social History     Socioeconomic History    Marital status: /Civil Union     Spouse name: Not on file    Number of children: 3    Years of education: Not on file    Highest education level: Not on file   Occupational History    Occupation:      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    Social Needs    Financial resource strain: Not on file    Food insecurity     Worry: Not on file     Inability: Not on file    Transportation needs     Medical: Not on file     Non-medical: Not on file   Tobacco Use    Smoking status: Former Smoker     Quit date:      Years since quittin 0    Smokeless tobacco: Never Used   Substance and Sexual Activity    Alcohol use: Yes     Comment: occ    Drug use: No    Sexual activity: Yes     Partners: Female   Lifestyle    Physical activity     Days per week: Not on file     Minutes per session: Not on file    Stress: Not on file   Relationships    Social connections     Talks on phone: Not on file     Gets together: Not on file     Attends Rastafarian service: Not on file     Active member of club or organization: Not on file     Attends meetings of clubs or organizations: Not on file     Relationship status: Not on file    Intimate partner violence     Fear of current or ex partner: Not on file     Emotionally abused: Not on file     Physically abused: Not on file     Forced sexual activity: Not on file   Other Topics Concern    Not on file   Social History Narrative    Recreational activities martial arts        Objective     Vitals:    01/28/21 1621   BP: 146/76   Pulse: (!) 54   Resp: 16   Temp: 98 5 °F (36 9 °C)   SpO2: 98%     Wt Readings from Last 3 Encounters:   01/28/21 103 kg (226 lb)   11/16/20 106 kg (233 lb 6 4 oz)   07/13/20 105 kg (231 lb 9 6 oz)       Physical Exam  Vitals signs and nursing note reviewed  Constitutional:       Appearance: Normal appearance  HENT:      Head: Normocephalic  Eyes:      Conjunctiva/sclera: Conjunctivae normal    Cardiovascular:      Rate and Rhythm: Normal rate and regular rhythm  Heart sounds: Normal heart sounds  Pulmonary:      Effort: Pulmonary effort is normal    Musculoskeletal:      Left shoulder: He exhibits decreased range of motion, tenderness, pain and spasm  Arms:       Comments: Positive arc at 110 degrees     Skin:     General: Skin is warm and dry  Capillary Refill: Capillary refill takes less than 2 seconds  Neurological:      Mental Status: He is alert and oriented to person, place, and time  Psychiatric:         Mood and Affect: Mood normal          Behavior: Behavior normal          Thought Content:  Thought content normal          Pertinent Laboratory/Diagnostic Studies:  Lab Results   Component Value Date    GLUCOSE 112 04/13/2015    BUN 22 07/10/2020    CREATININE 1 09 07/10/2020    CALCIUM 9 4 07/10/2020     04/13/2015    K 4 3 07/10/2020    CO2 28 07/10/2020     07/10/2020     Lab Results   Component Value Date    ALT 52 07/10/2020    AST 52 (H) 07/10/2020    ALKPHOS 63 07/10/2020    BILITOT 0 45 04/13/2015       Lab Results   Component Value Date    WBC 4 83 08/21/2019    HGB 16 2 08/21/2019    HCT 49 9 (H) 08/21/2019    MCV 85 08/21/2019     08/21/2019       No results found for: TSH    Lab Results   Component Value Date    CHOL 180 04/13/2015     Lab Results   Component Value Date    TRIG 181 (H) 07/10/2020     Lab Results   Component Value Date    HDL 7 (L) 07/10/2020     Lab Results   Component Value Date    LDLCALC 198 (H) 07/10/2020     Lab Results   Component Value Date    HGBA1C 6 0 (H) 07/10/2020       Results for orders placed or performed in visit on 07/10/20   Comprehensive metabolic panel   Result Value Ref Range    Sodium 137 136 - 145 mmol/L    Potassium 4 3 3 5 - 5 3 mmol/L    Chloride 105 100 - 108 mmol/L    CO2 28 21 - 32 mmol/L    ANION GAP 4 4 - 13 mmol/L    BUN 22 5 - 25 mg/dL    Creatinine 1 09 0 60 - 1 30 mg/dL    Glucose, Fasting 86 65 - 99 mg/dL    Calcium 9 4 8 3 - 10 1 mg/dL    AST 52 (H) 5 - 45 U/L    ALT 52 12 - 78 U/L    Alkaline Phosphatase 63 46 - 116 U/L    Total Protein 7 9 6 4 - 8 2 g/dL    Albumin 3 5 3 5 - 5 0 g/dL    Total Bilirubin 0 95 0 20 - 1 00 mg/dL    eGFR 75 ml/min/1 73sq m   Hemoglobin A1C   Result Value Ref Range    Hemoglobin A1C 6 0 (H) Normal 3 8-5 6%; PreDiabetic 5 7-6 4%;  Diabetic >=6 5%; Glycemic control for adults with diabetes <7 0% %     mg/dl   Lipid Panel with Direct LDL reflex   Result Value Ref Range    Cholesterol 241 (H) 50 - 200 mg/dL    Triglycerides 181 (H) <=150 mg/dL    HDL, Direct 7 (L) >=40 mg/dL    LDL Calculated 198 (H) 0 - 100 mg/dL   TSH, 3rd generation with Free T4 reflex   Result Value Ref Range    TSH 3RD GENERATON 0 432 0 358 - 3 740 uIU/mL       Orders Placed This Encounter   Procedures    XR shoulder 2+ vw left    Ambulatory referral to Orthopedic Surgery ALLERGIES:  Allergies   Allergen Reactions    Esomeprazole        Current Medications     Current Outpatient Medications   Medication Sig Dispense Refill    ALPRAZolam (XANAX) 0 25 mg tablet TAKE 1 1/2 TABS BY MOUTH AT BEDTIME 45 tablet 3    aspirin 81 MG tablet Take 1 tablet by mouth daily      levothyroxine 112 mcg tablet Take 1 tablet (112 mcg total) by mouth daily in the early morning 90 tablet 3    Multiple Vitamins-Minerals (CENTRUM SILVER 50+MEN PO) Take 1 tablet by mouth daily      omeprazole (PriLOSEC) 40 MG capsule Take 1 capsule (40 mg total) by mouth daily 90 capsule 3    rosuvastatin (CRESTOR) 10 MG tablet Take 1 tablet (10 mg total) by mouth daily 90 tablet 3    cyclobenzaprine (FLEXERIL) 10 mg tablet Take 1 tablet (10 mg total) by mouth 3 (three) times a day as needed for muscle spasms 30 tablet 0     No current facility-administered medications for this visit            Health Maintenance     Health Maintenance   Topic Date Due    HIV Screening  02/24/1979    BMI: Followup Plan  03/09/2021    Annual Physical  03/09/2021    Depression Screening PHQ  07/13/2021    BMI: Adult  01/28/2022    DTaP,Tdap,and Td Vaccines (3 - Td) 03/12/2024    Colonoscopy Surveillance  12/18/2024    Colorectal Cancer Screening  12/18/2029    Hepatitis C Screening  Completed    Influenza Vaccine  Completed    Pneumococcal Vaccine: Pediatrics (0 to 5 Years) and At-Risk Patients (6 to 59 Years)  Aged Out    HIB Vaccine  Aged Out    Hepatitis B Vaccine  Aged Out    IPV Vaccine  Aged Out    Hepatitis A Vaccine  Aged Out    Meningococcal ACWY Vaccine  Aged Out    HPV Vaccine  Aged [employer] History   Administered Date(s) Administered    INFLUENZA 10/31/2014, 01/23/2017, 12/04/2017    Influenza Quadrivalent Preservative Free 3 years and older IM 10/30/2015, 01/23/2017, 12/04/2017    Influenza, injectable, quadrivalent, preservative free 0 5 mL 10/06/2018    Influenza, recombinant, quadrivalent,injectable, preservative free 10/19/2019, 10/21/2020    Influenza, seasonal, injectable 12/12/2013    Tdap 09/27/2010, 03/12/2014     BMI Counseling: Body mass index is 32 43 kg/m²  The BMI is above normal  Nutrition recommendations include decreasing portion sizes, encouraging healthy choices of fruits and vegetables, decreasing fast food intake, consuming healthier snacks, limiting drinks that contain sugar, moderation in carbohydrate intake, increasing intake of lean protein, reducing intake of saturated and trans fat and reducing intake of cholesterol  Exercise recommendations include moderate physical activity 150 minutes/week, exercising 3-5 times per week and strength training exercises  No pharmacotherapy was ordered             MISSY Drake

## 2021-02-04 ENCOUNTER — APPOINTMENT (OUTPATIENT)
Dept: RADIOLOGY | Age: 56
End: 2021-02-04
Payer: COMMERCIAL

## 2021-02-04 DIAGNOSIS — M25.512 ACUTE PAIN OF LEFT SHOULDER: ICD-10-CM

## 2021-02-04 DIAGNOSIS — M62.838 MUSCLE SPASM: ICD-10-CM

## 2021-02-04 PROCEDURE — 73030 X-RAY EXAM OF SHOULDER: CPT

## 2021-02-08 ENCOUNTER — CONSULT (OUTPATIENT)
Dept: OBGYN CLINIC | Facility: OTHER | Age: 56
End: 2021-02-08
Payer: COMMERCIAL

## 2021-02-08 VITALS
DIASTOLIC BLOOD PRESSURE: 90 MMHG | BODY MASS INDEX: 31.78 KG/M2 | HEART RATE: 67 BPM | HEIGHT: 70 IN | SYSTOLIC BLOOD PRESSURE: 145 MMHG | WEIGHT: 222 LBS

## 2021-02-08 DIAGNOSIS — M75.42 SUBACROMIAL IMPINGEMENT OF LEFT SHOULDER: Primary | ICD-10-CM

## 2021-02-08 DIAGNOSIS — M25.512 ACUTE PAIN OF LEFT SHOULDER: ICD-10-CM

## 2021-02-08 PROCEDURE — 99243 OFF/OP CNSLTJ NEW/EST LOW 30: CPT | Performed by: ORTHOPAEDIC SURGERY

## 2021-02-08 PROCEDURE — 20610 DRAIN/INJ JOINT/BURSA W/O US: CPT | Performed by: ORTHOPAEDIC SURGERY

## 2021-02-08 PROCEDURE — 3008F BODY MASS INDEX DOCD: CPT | Performed by: ORTHOPAEDIC SURGERY

## 2021-02-08 PROCEDURE — 1036F TOBACCO NON-USER: CPT | Performed by: ORTHOPAEDIC SURGERY

## 2021-02-08 RX ORDER — BUPIVACAINE HYDROCHLORIDE 2.5 MG/ML
2 INJECTION, SOLUTION INFILTRATION; PERINEURAL
Status: COMPLETED | OUTPATIENT
Start: 2021-02-08 | End: 2021-02-08

## 2021-02-08 RX ORDER — BETAMETHASONE SODIUM PHOSPHATE AND BETAMETHASONE ACETATE 3; 3 MG/ML; MG/ML
6 INJECTION, SUSPENSION INTRA-ARTICULAR; INTRALESIONAL; INTRAMUSCULAR; SOFT TISSUE
Status: COMPLETED | OUTPATIENT
Start: 2021-02-08 | End: 2021-02-08

## 2021-02-08 RX ADMIN — BUPIVACAINE HYDROCHLORIDE 2 ML: 2.5 INJECTION, SOLUTION INFILTRATION; PERINEURAL at 10:14

## 2021-02-08 RX ADMIN — BETAMETHASONE SODIUM PHOSPHATE AND BETAMETHASONE ACETATE 6 MG: 3; 3 INJECTION, SUSPENSION INTRA-ARTICULAR; INTRALESIONAL; INTRAMUSCULAR; SOFT TISSUE at 10:14

## 2021-02-08 NOTE — LETTER
February 8, 2021     Patient: Glory Sawyer   YOB: 1965   Date of Visit: 2/8/2021       To Whom it May Concern:    Sebastian Pearson is under my professional care  He was seen in my office on 2/8/2021  He is to remain out of work today (2/8/2021)  May return to work tomorrow (2/9/2021) without restrictions or limitations  If you have any questions or concerns, please don't hesitate to call           Sincerely,          Lola Enriquez MD        CC: No Recipients

## 2021-02-08 NOTE — PROGRESS NOTES
Assessment  Diagnoses and all orders for this visit:    Subacromial impingement of left shoulder    Acute pain of left shoulder        Discussion and Plan:    · The patient has an examination consistent with subacromial impingement syndrome of the left shoulder  I have discussed with the patient the pathophysiology of this diagnosis and reviewed how the examination correlates with this diagnosis  Treatment options were discussed at length and after discussing these treatment options, the patient elected for and received a subacromial injection of corticosteroid (as described in the procedure note) with a prescription for referral to physical therapy  We will reevaluate the patient in 6-8 weeks with Petrona Dixon PA-C  If the symptoms fail to improve with this treatment the patient would be indicated for further imaging in the form of an MRI scan of the shoulder  Subjective:   Patient ID: Jay Nowak is a 63 y o  male      The patient presents today for an orthopedic surgery consultation visit at the request of Almond Harada, CRNP on 1/28/2021 with a chief complaint of left shoulder pain  The pain began 2 month(s) ago and is not associated with an acute injury  The patient describes the pain as aching and dull in intensity,  intermittent, occurring with increasing frequency in timing, and localizes the pain to the  left subacromial joint, deltoid  The pain is worse with overhead work, overuse and raising arm over head and relieved by rest, ice, avoiding the painful activities  The pain is not associated with numbness and tingling  The pain is not associated with constitutional symptoms  The patient is not awoken at night by the pain  The patient has had no prior treatment                 The following portions of the patient's history were reviewed and updated as appropriate: allergies, current medications, past family history, past medical history, past social history, past surgical history and problem list     Review of Systems   Constitutional: Negative for chills, fatigue, fever and unexpected weight change  HENT: Negative for hearing loss, nosebleeds and sore throat  Eyes: Negative for pain, redness and visual disturbance  Respiratory: Negative for cough, shortness of breath and wheezing  Cardiovascular: Negative for chest pain, palpitations and leg swelling  Gastrointestinal: Negative for abdominal pain, nausea and vomiting  Endocrine: Negative for polydipsia and polyuria  Genitourinary: Negative for frequency and urgency  Skin: Negative for color change, rash and wound  Neurological: Negative for dizziness, weakness, numbness and headaches  Psychiatric/Behavioral: Negative for behavioral problems, self-injury and suicidal ideas  Objective:  /90   Pulse 67   Ht 5' 10" (1 778 m)   Wt 101 kg (222 lb)   BMI 31 85 kg/m²       Left Shoulder Exam     Tenderness   The patient is experiencing no tenderness  Range of Motion   External rotation: 60   Forward flexion: 160   Internal rotation 0 degrees: Lumbar     Muscle Strength   Abduction: 5/5   External rotation: 5/5     Tests   Graf test: positive  Impingement: positive  Drop arm: negative    Other   Erythema: absent  Sensation: normal  Pulse: present             Physical Exam  Constitutional:       General: He is not in acute distress  Appearance: He is well-developed  Eyes:      Conjunctiva/sclera: Conjunctivae normal       Pupils: Pupils are equal, round, and reactive to light  Neck:      Musculoskeletal: Normal range of motion and neck supple  Cardiovascular:      Rate and Rhythm: Normal rate and regular rhythm  Pulmonary:      Effort: Pulmonary effort is normal       Breath sounds: Normal breath sounds  Abdominal:      General: Bowel sounds are normal       Palpations: Abdomen is soft  Skin:     General: Skin is warm and dry  Findings: No erythema or rash     Neurological:      Mental Status: He is alert and oriented to person, place, and time  Deep Tendon Reflexes: Reflexes are normal and symmetric  Psychiatric:         Behavior: Behavior normal        Large joint arthrocentesis: L subacromial bursa  Universal Protocol:  Consent: Verbal consent obtained  Risks and benefits: risks, benefits and alternatives were discussed  Consent given by: patient  Time out: Immediately prior to procedure a "time out" was called to verify the correct patient, procedure, equipment, support staff and site/side marked as required  Site marked: the operative site was marked  Supporting Documentation  Indications: pain and diagnostic evaluation   Procedure Details  Location: shoulder - L subacromial bursa  Preparation: Patient was prepped and draped in the usual sterile fashion  Needle size: 22 G  Ultrasound guidance: no  Approach: lateral  Medications administered: 2 mL bupivacaine 0 25 %; 6 mg betamethasone acetate-betamethasone sodium phosphate 6 (3-3) mg/mL    Patient tolerance: patient tolerated the procedure well with no immediate complications  Dressing:  Sterile dressing applied              I have personally reviewed pertinent films in PACS and my interpretation is as follows  X Ray Left Shoulder 2/4/2021: No acute osseous abnormality or degenerative changes       Scribe Attestation    I,:  Shara Mari am acting as a scribe while in the presence of the attending physician :       I,:  Julianne Marcelino MD personally performed the services described in this documentation    as scribed in my presence :

## 2021-02-08 NOTE — PATIENT INSTRUCTIONS

## 2021-02-11 ENCOUNTER — EVALUATION (OUTPATIENT)
Dept: PHYSICAL THERAPY | Facility: OTHER | Age: 56
End: 2021-02-11
Payer: COMMERCIAL

## 2021-02-11 DIAGNOSIS — M25.512 ACUTE PAIN OF LEFT SHOULDER: ICD-10-CM

## 2021-02-11 DIAGNOSIS — M75.42 SUBACROMIAL IMPINGEMENT OF LEFT SHOULDER: ICD-10-CM

## 2021-02-11 PROCEDURE — 97162 PT EVAL MOD COMPLEX 30 MIN: CPT | Performed by: PHYSICAL THERAPIST

## 2021-02-11 NOTE — PROGRESS NOTES
PT Evaluation     Today's date: 2021  Patient name: Jose Aquino  : 1965  MRN: [id number]  Referring provider: Rosa Cardona MD  Dx:   Encounter Diagnosis     ICD-10-CM    1  Subacromial impingement of left shoulder  M75 42 Ambulatory referral to Physical Therapy     PT plan of care cert/re-cert   2  Acute pain of left shoulder  M25 512 Ambulatory referral to Physical Therapy     PT plan of care cert/re-cert       Start Time:   Stop Time: 161  Total time in clinic (min): 45 minutes    Assessment  Assessment details: Jose Aquino is a pleasant 63 y o  male who presents with L sided shoulder pain, decreased scap stabilizer strength, poor flexibility, poor posture, leading to subacromial impingement of the L shoulder  The patient's greatest concerns are concern at no signs of improvement and fear of not being able to keep active  The primary movement impairment diagnosis is L shoulder pain with movement coordination impairments limiting his ability to carry, drive, exercise or recreation, lift and work overhead  No further referral appears necessary at this time based upon examination results  Primary Impairments:  1) decreased shoulder ROM   2) decreased scap stabilizer strength  3) poor flexibility of pec, upper trap  4) decreased tspine mobility  5) shoulder pain          Impairments: abnormal or restricted ROM, abnormal movement, impaired physical strength, lacks appropriate home exercise program, pain with function and scapular dyskinesis    Symptom irritability: moderateUnderstanding of Dx/Px/POC: good   Prognosis: good  Prognosis details: Positive prognostic indicators include positive attitude toward recovery  Negative prognostic indicators include chronicity of symptoms, high symptom irritability  Goals  STG's to be achieved in 4 weeks:  1) Patient will have normal pain free AROM of L shoulder  2) Patient will improve scap strength by 1/2 muscle grade     3) Patient will have no greater than 2/10 shoulder pain with prolonged work overhead >5 hours  LTG's to be achieved in8 weeks:  1) Patient will be independent and compliant with HEP  2) Patient will be able to lift 50 #'s or greater with no greater than 2/10 shoulder pain  3) Patient will score 75 or greater on FOTO  Plan  Plan details: Access Code: [email or web address]   URL: [email or web address]   Date: 02/11/2021   Prepared by: Alin Gutierrez      Exercises  · Seated Cervical Sidebending Stretch - 10 reps - 10 hold - 1x daily - 7x weekly  · Doorway Pec Stretch at 90 Degrees Abduction - 4 reps - 30 hold - 1x daily - 7x weekly  · Seated Scapular Retraction - 10 reps - 3 sets - 1x daily - 7x weekly  · Prone Triple Extension with W - 10 reps - 3 sets - 1x daily - 7x weekly      Patient would benefit from: skilled physical therapy  Planned modality interventions: thermotherapy: hydrocollator packs  Planned therapy interventions: activity modification, joint mobilization, manual therapy, motor coordination training, neuromuscular re-education, patient education, self care, therapeutic activities, therapeutic exercise, graded activity, home exercise program and behavior modification  Frequency: 2x week  Duration in weeks: 8  Treatment plan discussed with: patient        Subjective Evaluation    History of Present Illness  Date of onset: 11/11/2020  Mechanism of injury: Jefe Dey reports a chronic history of L shoulder pain beginning 2 months ago  The pain is worse with overhead work, overuse and raising arm over head and relieved by rest, ice, avoiding the painful activities  Patient reports that he received a CSI a few weeks ago which significantly improved his pain  Patient states prior to the injection he was getting migraines and severe upper trap spasm  Patient reports most difficulty with work related tasks including working overhead  States he works overhead for 90% of his day   He also notes that 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  Pain  Current pain ratin  At best pain ratin  At worst pain ratin          Objective     Active Range of Motion   Left Shoulder   Normal active range of motion    Right Shoulder   Normal active range of motion  Flexion: with pain  Abduction: with pain  External rotation BTH: with pain  Internal rotation BTB: with pain    Additional Active Range of Motion Details  Noted scap winging b/l     Strength/Myotome Testing     Left Shoulder     Planes of Motion   Flexion: 5   Extension: 5   Abduction: 5   External rotation at 0°: 4+   External rotation at 90°: 4   Internal rotation at 0°: 4+     Isolated Muscles   Lower trapezius: 3+   Middle trapezius: 3+   Serratus anterior: 3+     Right Shoulder     Planes of Motion   Flexion: 5   Extension: 5   Abduction: 5   External rotation at 0°: 5   External rotation at 90°: 4+   Internal rotation at 0°: 5     Isolated Muscles   Lower trapezius: 3+   Middle trapezius: 3+   Serratus anterior: 3+     Tests   Cervical   Negative vertical compression and lumbar distraction  Left   Positive Spurling's Test B  Left Shoulder   Positive Hawkin's, Neer's, painful arc and Conner's  Negative ULTT1, ULTT2, ULTT3 and bicep load   Lumbar   Negative vertical compression       General Comments:      Cervical/Thoracic Comments  AROM-WNL      Flowsheet Rows      Most Recent Value   PT/OT G-Codes   Current Score  63   Projected Score  75             Precautions: none      Manuals             Post/inf mobs EB                                                   Neuro Re-Ed             Prone W 3 x 10, 5"            scap squeeze 3 x 10, 5"            PBALL YTI             [place] LPD,row, triceps ext             Wall slide                                       Ther Ex             ube             Upper trap stretch 10 x 10"            pec stretch 10x 10" Ther Activity                                       Gait Training                                       Modalities

## 2021-02-15 ENCOUNTER — OFFICE VISIT (OUTPATIENT)
Dept: PHYSICAL THERAPY | Facility: OTHER | Age: 56
End: 2021-02-15
Payer: COMMERCIAL

## 2021-02-15 DIAGNOSIS — M25.512 ACUTE PAIN OF LEFT SHOULDER: ICD-10-CM

## 2021-02-15 DIAGNOSIS — M75.42 SUBACROMIAL IMPINGEMENT OF LEFT SHOULDER: Primary | ICD-10-CM

## 2021-02-15 PROCEDURE — 97110 THERAPEUTIC EXERCISES: CPT

## 2021-02-15 PROCEDURE — 97112 NEUROMUSCULAR REEDUCATION: CPT

## 2021-02-15 PROCEDURE — 97140 MANUAL THERAPY 1/> REGIONS: CPT

## 2021-02-15 NOTE — PROGRESS NOTES
Daily Note     Today's date: 2/15/2021  Patient name: Rossi Marina  : 1965  MRN: [id number]  Referring provider: Mya Alicea MD  Dx:   Encounter Diagnosis     ICD-10-CM    1  Subacromial impingement of left shoulder  M75 42    2  Acute pain of left shoulder  M25 512         1 on 1 with PT JRS 4:45- 5:45        Subjective: Patient reports his shoulder is feeling better today  He reports compliance with HEP and denies having any questions  He states his left cervical pain continues but reports no increase in cervical-related symptoms  Objective: See treatment diary below      Assessment: Tolerated treatment well  Patient was able to progress strengthening without difficulty  Plan: Continue plan of care, progress strengthening as tolerated          Precautions: none      Manuals 2/11 2/15/21           Post/inf mobs EB JRS                                                  Neuro Re-Ed  2/15/21           Prone W 3 x 10, 5" PB 2 x 10 2#           scap squeeze 3 x 10, 5" 3 x 10 x 5"           PBALL YTI  PB 2 x 10 1#           [place] LPD,row, triceps ext  25# 3 x 10 each LPD & Row           Wall slide  NV           Ladders  Flex Only 10 x 10"                        Ther Ex  2/15/21           ube  3' / 3'           Upper trap stretch 10 x 10" 3 x 30"           pec stretch 10x 10" 3 x 30"                                                                            Ther Activity                                       Gait Training                                       Modalities

## 2021-02-17 ENCOUNTER — OFFICE VISIT (OUTPATIENT)
Dept: PHYSICAL THERAPY | Facility: OTHER | Age: 56
End: 2021-02-17
Payer: COMMERCIAL

## 2021-02-17 DIAGNOSIS — M25.512 ACUTE PAIN OF LEFT SHOULDER: ICD-10-CM

## 2021-02-17 DIAGNOSIS — M75.42 SUBACROMIAL IMPINGEMENT OF LEFT SHOULDER: Primary | ICD-10-CM

## 2021-02-17 PROCEDURE — 97112 NEUROMUSCULAR REEDUCATION: CPT | Performed by: PHYSICAL THERAPIST

## 2021-02-17 PROCEDURE — 97140 MANUAL THERAPY 1/> REGIONS: CPT | Performed by: PHYSICAL THERAPIST

## 2021-02-17 NOTE — PROGRESS NOTES
Daily Note     Today's date: 2021  Patient name: Glory Sawyer  : 1965  MRN: [id number]  Referring provider: Caden Gutierrez MD  Dx:   Encounter Diagnosis     ICD-10-CM    1  Subacromial impingement of left shoulder  M75 42    2  Acute pain of left shoulder  M25 512        Start Time: 86429 on 1 with PT from 315-350, not billed remainder  Stop Time: 1550  Total time in clinic (min): 50 minutes  Subjective: Patient reports he loaded his shoulder reaching today and had mild increase in pain  However, reports pain has now dissipated  Reports continued compliance with HEP  Notes tightness in upper trap as primary complaint today  Objective: See treatment diary below      Assessment: Tolerated treatment well  Patient noting mild impingement with attempt to increase resistance with rows and pull downs  Therefore, did not increase today  Patient pain free throughout session  Plan: Continue plan of care, progress strengthening as tolerated          Precautions: none      Manuals 2/11 2/15/21 2/17          Post/inf mobs EB JRS EB          Cupping upper trap   EB                                    Neuro Re-Ed  2/15/21           Prone W 3 x 10, 5" PB 2 x 10 2# PB 3 x 10, 4#          scap squeeze 3 x 10, 5" 3 x 10 x 5"           PBALL YTI  PB 2 x 10 1# PB 3 x 10, 4#          [place] LPD,row, triceps ext  25# 3 x 10 each LPD & Row 25# 3 x 10 each LPD & Row          scap punch, sh' flex with er iso   5# 3x 10           Wall slide  NV           Cork screw pres   7 5# KB 3 x 10           Box walk over                          Ther Ex  2/15/21           ube  3' / 3' 3/3, L2          Upper trap stretch 10 x 10" 3 x 30" 3 x 30"          pec stretch 10x 10" 3 x 30" 3 x 30"                                                                           Ther Activity                                       Gait Training                                       Modalities

## 2021-02-22 ENCOUNTER — OFFICE VISIT (OUTPATIENT)
Dept: PHYSICAL THERAPY | Facility: OTHER | Age: 56
End: 2021-02-22
Payer: COMMERCIAL

## 2021-02-22 DIAGNOSIS — M25.512 ACUTE PAIN OF LEFT SHOULDER: ICD-10-CM

## 2021-02-22 DIAGNOSIS — M75.42 SUBACROMIAL IMPINGEMENT OF LEFT SHOULDER: Primary | ICD-10-CM

## 2021-02-22 PROCEDURE — 97110 THERAPEUTIC EXERCISES: CPT | Performed by: PHYSICAL THERAPIST

## 2021-02-22 PROCEDURE — 97112 NEUROMUSCULAR REEDUCATION: CPT | Performed by: PHYSICAL THERAPIST

## 2021-02-22 PROCEDURE — 97140 MANUAL THERAPY 1/> REGIONS: CPT | Performed by: PHYSICAL THERAPIST

## 2021-02-22 NOTE — PROGRESS NOTES
Daily Note     Today's date: 2021  Patient name: Yun Moser  : 1965  MRN: [id number]  Referring provider: Sierra Woods MD  Dx:   Encounter Diagnosis     ICD-10-CM    1  Subacromial impingement of left shoulder  M75 42    2  Acute pain of left shoulder  M25 512        Start Time: 61717 on 1 with PT for entirety  Stop Time: 1430  Total time in clinic (min): 50 minutes  Subjective: Patient reports significant improvement in upper trap flexibility with added cupping last visit  States he was able to help his wife carry in groceries this weekend  States this was the first time he has been able to do this since starting PT  Objective: See treatment diary below      Assessment: Tolerated treatment well  Continued with cupping, noting further improvement in upper trap flexibility  Patient with good tolerance to current program  Patient continues to be easily fatigued with current scap stabilizer program        Plan: Continue plan of care, progress strengthening as tolerated          Precautions: none      Manuals 2/11 2/15/21 2/17 2/22         Post/inf mobs EB JRS EB EB         Cupping upper trap   EB EB                                   Neuro Re-Ed  2/15/21           Prone W 3 x 10, 5" PB 2 x 10 2# PB 3 x 10, 4# PB 3 x 10, 4#         scap squeeze 3 x 10, 5" 3 x 10 x 5"           PBALL YTI  PB 2 x 10 1# PB 3 x 10, 4# PB 3 x 10, 4#         [place] LPD,row, triceps ext  25# 3 x 10 each LPD & Row 25# 3 x 10 each LPD & Row 25# 3 x 10 each LPD & Row         scap punch, sh' flex with er iso   5# 3x 10  5# 3x 10          Wall slide, w/ ER isometric  NV  3 x 10", OTB         Cork screw pres   7 5# KB 3 x 10  7 5# KB 3 x 10          Box walk over    NV         BOSU ball push up    NV                      Ther Ex  2/15/21           ube  3' / 3' 3/3, L2 3/3, L2         Upper trap stretch 10 x 10" 3 x 30" 3 x 30" 5 x 30"         pec stretch 10x 10" 3 x 30" 3 x 30" 4 x 30" Ther Activity                                       Gait Training                                       Modalities

## 2021-02-25 ENCOUNTER — APPOINTMENT (OUTPATIENT)
Dept: PHYSICAL THERAPY | Facility: OTHER | Age: 56
End: 2021-02-25
Payer: COMMERCIAL

## 2021-03-01 ENCOUNTER — OFFICE VISIT (OUTPATIENT)
Dept: PHYSICAL THERAPY | Facility: OTHER | Age: 56
End: 2021-03-01
Payer: COMMERCIAL

## 2021-03-01 DIAGNOSIS — M75.42 SUBACROMIAL IMPINGEMENT OF LEFT SHOULDER: Primary | ICD-10-CM

## 2021-03-01 DIAGNOSIS — M25.512 ACUTE PAIN OF LEFT SHOULDER: ICD-10-CM

## 2021-03-01 PROCEDURE — 97110 THERAPEUTIC EXERCISES: CPT

## 2021-03-01 PROCEDURE — 97112 NEUROMUSCULAR REEDUCATION: CPT

## 2021-03-01 PROCEDURE — 97140 MANUAL THERAPY 1/> REGIONS: CPT

## 2021-03-01 NOTE — PROGRESS NOTES
Daily Note     Today's date: 3/1/2021  Patient name: Angel Steele  : 1965  MRN: [id number]  Referring provider: Nancy Lee MD  Dx:   Encounter Diagnosis     ICD-10-CM    1  Subacromial impingement of left shoulder  M75 42    2  Acute pain of left shoulder  M25 512         1 on 1 with PT JRS 3:20-3:50, IEP 3:50-4:10     Subjective: Patient reports significant improvement in shoulder symptoms  However, as a result of driving this past weekend, he has increased left cervical spine pain and HA  Patient reported significant relief from manual therapy and stretching today  Objective: See treatment diary below  Instructed patient in cervical retractions and self sub-occipital release when he experiences HA and cervical pain secondary to prolonged sitting  Added these exercises to patient's HEP to be performed as needed  Assessment: Tolerated treatment well  Patient benefited from mobilization, counterstrain, and cervical stretching today  Patient continues to progress left shoulder strengthening and will continue to benefit from PT  Plan: Continue plan of care, progress strengthening and functional shoulder activity as tolerated          Precautions: none      Manuals 2/11 2/15/21 2/17 2/22 3/1/21        Post/inf mobs EB JRS EB EB NP        Cupping upper trap   EB EB NP        Thoracic spine rotation mob     G4 T7-T2  JRS        Cervical spine stretching     3 x 30" each UT & LS        Counter-strain for left UT and rhomboids     JRS                                  Neuro Re-Ed  2/15/21   3/1/21        Prone W 3 x 10, 5" PB 2 x 10 2# PB 3 x 10, 4# PB 3 x 10, 4# PB 4# x 20        scap squeeze 3 x 10, 5" 3 x 10 x 5"   NP        PBALL YTI  PB 2 x 10 1# PB 3 x 10, 4# PB 3 x 10, 4# PB 4# x 20        [place] LPD,row, triceps ext  25# 3 x 10 each LPD & Row 25# 3 x 10 each LPD & Row 25# 3 x 10 each LPD & Row 30# x 20 each LPD, Rows        scap punch, sh' flex with er iso   5# 3x 10  5# 3x 10  8# x 30        Wall slide, w/ ER isometric  NV  3 x 10", OTB NV        Cork screw pres   7 5# KB 3 x 10  7 5# KB 3 x 10  7 5# KB 3 x 10         Box walk over    NV Box walks 3 x 30"        BOSU ball push up    NV 2 x 10                     Ther Ex  2/15/21   3/1/21        ube  3' / 3' 3/3, L2 3/3, L2 3/3 L2        Upper trap stretch 10 x 10" 3 x 30" 3 x 30" 5 x 30" 3 x 30"        pec stretch 10x 10" 3 x 30" 3 x 30" 4 x 30" 3 x 30"                                                                         Ther Activity                                       Gait Training                                       Modalities

## 2021-03-04 ENCOUNTER — OFFICE VISIT (OUTPATIENT)
Dept: PHYSICAL THERAPY | Facility: OTHER | Age: 56
End: 2021-03-04
Payer: COMMERCIAL

## 2021-03-04 DIAGNOSIS — M25.512 ACUTE PAIN OF LEFT SHOULDER: ICD-10-CM

## 2021-03-04 DIAGNOSIS — M75.42 SUBACROMIAL IMPINGEMENT OF LEFT SHOULDER: Primary | ICD-10-CM

## 2021-03-04 PROCEDURE — 97110 THERAPEUTIC EXERCISES: CPT | Performed by: PHYSICAL THERAPIST

## 2021-03-04 PROCEDURE — 97140 MANUAL THERAPY 1/> REGIONS: CPT | Performed by: PHYSICAL THERAPIST

## 2021-03-04 PROCEDURE — 97112 NEUROMUSCULAR REEDUCATION: CPT | Performed by: PHYSICAL THERAPIST

## 2021-03-04 NOTE — PROGRESS NOTES
Daily Note     Today's date: 3/4/2021  Patient name: Mani So  : 1965  MRN: [id number]  Referring provider: Stephanie Diaz MD  Dx:   Encounter Diagnosis     ICD-10-CM    1  Subacromial impingement of left shoulder  M75 42    2  Acute pain of left shoulder  M25 512        Start Time: 14266 on 1 with -330, not billed remainder  Total time in clinic (min): 60 minutes  Subjective: Patient reports continued improvement in shoulder and neck symptoms  Reports compliance with home soft tissue releases which significantly improved his ability to complete long drives  Objective: See treatment diary below  Instructed patient in cervical retractions and self sub-occipital release when he experiences HA and cervical pain secondary to prolonged sitting  Added these exercises to patient's HEP to be performed as needed  Assessment: Tolerated treatment well  Patient benefited from mobilization, counterstrain, and cervical stretching today  Patient continues to progress left shoulder strengthening and will continue to benefit from PT  Plan: Continue plan of care, progress strengthening and functional shoulder activity as tolerated          Precautions: none      Manuals 2/11 2/15/21 2/17 2/22 3/1/21 3/4       Post/inf mobs EB JRS EB EB NP        Cupping upper trap   EB EB NP        Thoracic spine rotation mob     G4 T7-T2  JRS GR 5 supine       Cervical spine stretching     3 x 30" each UT & LS EB       Counter-strain for left UT and rhomboids     JRS                                  Neuro Re-Ed  2/15/21   3/1/21        Prone W 3 x 10, 5" PB 2 x 10 2# PB 3 x 10, 4# PB 3 x 10, 4# PB 4# x 20 PB 4# x 30       scap squeeze 3 x 10, 5" 3 x 10 x 5"   NP        PBALL YTI  PB 2 x 10 1# PB 3 x 10, 4# PB 3 x 10, 4# PB 4# x 20 PB 4# x 30       [place] LPD,row, triceps ext  25# 3 x 10 each LPD & Row 25# 3 x 10 each LPD & Row 25# 3 x 10 each LPD & Row 30# x 20 each LPD, Rows 30# x 20 each LPD, Rows scap punch, sh' flex with er iso   5# 3x 10  5# 3x 10  8# x 30 8# x 30       Wall slide, w/ ER isometric  NV  3 x 10", OTB NV 3 x 10, blue       Cork screw pres   7 5# KB 3 x 10  7 5# KB 3 x 10  7 5# KB 3 x 10  10# 3 x 10       Box walk over    NV Box walks 3 x 30" Box walks 3 x 30"       BOSU ball push up    NV 2 x 10 2 x 10                    Ther Ex  2/15/21   3/1/21        ube  3' / 3' 3/3, L2 3/3, L2 3/3 L2 3/3 L2       Upper trap stretch 10 x 10" 3 x 30" 3 x 30" 5 x 30" 3 x 30" 4 x 30"       pec stretch 10x 10" 3 x 30" 3 x 30" 4 x 30" 3 x 30" 4 x 30"                                                                        Ther Activity                                       Gait Training                                       Modalities

## 2021-03-08 ENCOUNTER — OFFICE VISIT (OUTPATIENT)
Dept: PHYSICAL THERAPY | Facility: OTHER | Age: 56
End: 2021-03-08
Payer: COMMERCIAL

## 2021-03-08 DIAGNOSIS — M25.512 ACUTE PAIN OF LEFT SHOULDER: ICD-10-CM

## 2021-03-08 DIAGNOSIS — M75.42 SUBACROMIAL IMPINGEMENT OF LEFT SHOULDER: Primary | ICD-10-CM

## 2021-03-08 PROCEDURE — 97112 NEUROMUSCULAR REEDUCATION: CPT | Performed by: PHYSICAL THERAPIST

## 2021-03-08 PROCEDURE — 97140 MANUAL THERAPY 1/> REGIONS: CPT | Performed by: PHYSICAL THERAPIST

## 2021-03-08 PROCEDURE — 97110 THERAPEUTIC EXERCISES: CPT | Performed by: PHYSICAL THERAPIST

## 2021-03-08 NOTE — PROGRESS NOTES
Daily Note     Today's date: 3/8/2021  Patient name: Smith Miller  : 1965  MRN: [id number]  Referring provider: Adán Jenkins MD  Dx:   Encounter Diagnosis     ICD-10-CM    1  Subacromial impingement of left shoulder  M75 42    2  Acute pain of left shoulder  M25 512        Start Time: 59869 on 1 with -415, not billed remainder  Total time in clinic (min): 50 minutes  Subjective: Patient pain free since last treatment session  Was able to complete long drives for travel for work 4+ hours multiple times last week without headache or neck or shoulder symptoms  Reports compliance with home program and independence with current program      Objective: See treatment diary below  Assessment: Tolerated treatment well  Patient with good tolerance to today's session  Patient independent with current program  Patient pain free throughout today's session and has met all functional goals  Angelita Canas is discharged from formal PT at this time  I will happily see Mr Pj Varma in the future should any future problems arise  Plan: Continue plan of care, progress strengthening and functional shoulder activity as tolerated          Precautions: none      Manuals 2/11 2/15/21 2/17 2/22 3/1/21 3/4 3/8      Post/inf mobs EB JRS EB EB NP        Cupping upper trap   EB EB NP  EB      Thoracic spine rotation mob     G4 T7-T2  JRS GR 5 supine       Cervical spine stretching     3 x 30" each UT & LS EB       Counter-strain for left UT and rhomboids     JRS                                  Neuro Re-Ed  2/15/21   3/1/21        Prone W 3 x 10, 5" PB 2 x 10 2# PB 3 x 10, 4# PB 3 x 10, 4# PB 4# x 20 PB 4# x 30 PB 5# x 30      scap squeeze 3 x 10, 5" 3 x 10 x 5"   NP        PBALL YTI  PB 2 x 10 1# PB 3 x 10, 4# PB 3 x 10, 4# PB 4# x 20 PB 4# x 30 PB 5# x 30      [place] LPD,row, triceps ext  25# 3 x 10 each LPD & Row 25# 3 x 10 each LPD & Row 25# 3 x 10 each LPD & Row 30# x 20 each LPD, Rows 30# x 20 each LPD, Rows 30# x 20 each LPD, Rows      scap punch, sh' flex with er iso   5# 3x 10  5# 3x 10  8# x 30 8# x 30 8# x 30      Wall slide, w/ ER isometric  NV  3 x 10", OTB NV 3 x 10, blue 3 x 10, plum      Cork screw pres   7 5# KB 3 x 10  7 5# KB 3 x 10  7 5# KB 3 x 10  10# 3 x 10 10# 3 x 10      Box walk over    NV Box walks 3 x 30" Box walks 3 x 30" Box walks 3 x 30"      BOSU ball push up    NV 2 x 10 2 x 10 3 x 10                   Ther Ex  2/15/21   3/1/21        ube  3' / 3' 3/3, L2 3/3, L2 3/3 L2 3/3 L2 3/3, L2      Upper trap stretch 10 x 10" 3 x 30" 3 x 30" 5 x 30" 3 x 30" 4 x 30" 4 x 30"      pec stretch 10x 10" 3 x 30" 3 x 30" 4 x 30" 3 x 30" 4 x 30" 4 x 30"                                                                       Ther Activity                                       Gait Training                                       Modalities

## 2021-03-10 DIAGNOSIS — Z23 ENCOUNTER FOR IMMUNIZATION: ICD-10-CM

## 2021-03-11 ENCOUNTER — APPOINTMENT (OUTPATIENT)
Dept: PHYSICAL THERAPY | Facility: OTHER | Age: 56
End: 2021-03-11
Payer: COMMERCIAL

## 2021-03-15 ENCOUNTER — IMMUNIZATIONS (OUTPATIENT)
Dept: FAMILY MEDICINE CLINIC | Facility: HOSPITAL | Age: 56
End: 2021-03-15

## 2021-03-15 ENCOUNTER — APPOINTMENT (OUTPATIENT)
Dept: PHYSICAL THERAPY | Facility: OTHER | Age: 56
End: 2021-03-15
Payer: COMMERCIAL

## 2021-03-15 DIAGNOSIS — Z23 ENCOUNTER FOR IMMUNIZATION: Primary | ICD-10-CM

## 2021-03-15 PROCEDURE — 0001A SARS-COV-2 / COVID-19 MRNA VACCINE (PFIZER-BIONTECH) 30 MCG: CPT

## 2021-03-15 PROCEDURE — 91300 SARS-COV-2 / COVID-19 MRNA VACCINE (PFIZER-BIONTECH) 30 MCG: CPT

## 2021-03-18 ENCOUNTER — APPOINTMENT (OUTPATIENT)
Dept: PHYSICAL THERAPY | Facility: OTHER | Age: 56
End: 2021-03-18
Payer: COMMERCIAL

## 2021-04-05 ENCOUNTER — IMMUNIZATIONS (OUTPATIENT)
Dept: FAMILY MEDICINE CLINIC | Facility: HOSPITAL | Age: 56
End: 2021-04-05

## 2021-04-05 ENCOUNTER — OFFICE VISIT (OUTPATIENT)
Dept: OBGYN CLINIC | Facility: OTHER | Age: 56
End: 2021-04-05
Payer: COMMERCIAL

## 2021-04-05 VITALS
HEIGHT: 70 IN | HEART RATE: 61 BPM | SYSTOLIC BLOOD PRESSURE: 133 MMHG | BODY MASS INDEX: 32.93 KG/M2 | DIASTOLIC BLOOD PRESSURE: 80 MMHG | WEIGHT: 230 LBS

## 2021-04-05 DIAGNOSIS — Z23 ENCOUNTER FOR IMMUNIZATION: Primary | ICD-10-CM

## 2021-04-05 DIAGNOSIS — M75.42 IMPINGEMENT SYNDROME OF LEFT SHOULDER: Primary | ICD-10-CM

## 2021-04-05 DIAGNOSIS — M54.12 RADICULOPATHY, CERVICAL REGION: ICD-10-CM

## 2021-04-05 PROCEDURE — 1036F TOBACCO NON-USER: CPT | Performed by: PHYSICIAN ASSISTANT

## 2021-04-05 PROCEDURE — 91300 SARS-COV-2 / COVID-19 MRNA VACCINE (PFIZER-BIONTECH) 30 MCG: CPT

## 2021-04-05 PROCEDURE — 99213 OFFICE O/P EST LOW 20 MIN: CPT | Performed by: PHYSICIAN ASSISTANT

## 2021-04-05 PROCEDURE — 3008F BODY MASS INDEX DOCD: CPT | Performed by: PHYSICIAN ASSISTANT

## 2021-04-05 PROCEDURE — 0002A SARS-COV-2 / COVID-19 MRNA VACCINE (PFIZER-BIONTECH) 30 MCG: CPT

## 2021-04-05 NOTE — LETTER
April 5, 2021     Patient: Rupinder Urias   YOB: 1965   Date of Visit: 4/5/2021       To Whom it May Concern:    Kraig Delia is under my professional care  He was seen in my office on 4/5/2021  He may return to work without any restrictions or limitations  If you have any questions or concerns, please don't hesitate to call           Sincerely,          Collin Miguel PA-C        CC: No Recipients

## 2021-04-05 NOTE — PROGRESS NOTES
Assessment  Diagnoses and all orders for this visit:    Impingement syndrome of left shoulder    Radiculopathy, cervical region        Discussion and Plan:    Left shoulder exam looks good today  Good motion and strength  Most overhead pain has resolved  Most complaints today are trapezial and neck related  Valeriy Roche will continue with his shoulder exercises and follow up if symptoms return  1  Continue with therapy guided HEP  2  Follow up as needed  Valeriy Roche wants to continue with the exercises therapy provided for his neck  If those symptoms persist, then referral to spine center will be appropriate  He will call the office if he requires this referral   Valeriy Roche also wants to try Acupuncture since it was recommended by a friend  3  Note for work provided stating that Valeriy Roche was here and that he can return to work without limitations  Subjective:   Patient ID: Vladislav Alvarenga is a 61 y o  male      Valeriy Roche presents to the office in follow up of his left shoulder  At last visit he was diagnosed with subacromial impingement syndrome  He was provided an injection and referral to physical therapy  Carlos noted the most relief with the physical therapy  He was able to improve motion as well as pain  He admits to anterior shoulder tightness with overhead  He also admits to similar discomfort when performing a push up  More than the shoulder, Valeriy Roche is complaining of trapezial pain with radiation into neck and head  At times he feels like the pain radiates up into his ear or left eye  His pain is often alleviated by applying pressure to these areas  Denies numbness or tingling into hand today  The following portions of the patient's history were reviewed and updated as appropriate: allergies, current medications, past family history, past medical history, past social history, past surgical history and problem list     Review of Systems   Constitutional: Negative for chills and fever     HENT: Negative for hearing loss  Eyes: Negative for visual disturbance  Respiratory: Negative for shortness of breath  Cardiovascular: Negative for chest pain  Gastrointestinal: Negative for abdominal pain  Musculoskeletal: Positive for neck pain and neck stiffness  As reviewed in the HPI   Skin: Negative for rash  Neurological: Positive for headaches  As reviewed in the HPI   Psychiatric/Behavioral: Negative for agitation  Objective:  /80 (BP Location: Right arm, Patient Position: Sitting, Cuff Size: Adult)   Pulse 61   Ht 5' 10" (1 778 m)   Wt 104 kg (230 lb)   BMI 33 00 kg/m²       Left Shoulder Exam     Tenderness   Left shoulder tenderness location: trapezius  Range of Motion   The patient has normal left shoulder ROM  Muscle Strength   External rotation: 5/5   Supraspinatus: 5/5     Tests   Graf test: negative  Impingement: negative  Drop arm: negative    Other   Sensation: normal  Pulse: present             Physical Exam  Constitutional:       Appearance: He is well-developed  HENT:      Head: Normocephalic  Neck:      Musculoskeletal: Normal range of motion  Pulmonary:      Effort: No respiratory distress  Breath sounds: No wheezing  Skin:     General: Skin is warm and dry  Neurological:      Mental Status: He is alert and oriented to person, place, and time  Psychiatric:         Behavior: Behavior normal          Thought Content:  Thought content normal          Judgment: Judgment normal

## 2021-05-03 ENCOUNTER — TRANSCRIBE ORDERS (OUTPATIENT)
Dept: ADMINISTRATIVE | Age: 56
End: 2021-05-03

## 2021-05-03 ENCOUNTER — APPOINTMENT (OUTPATIENT)
Dept: URGENT CARE | Age: 56
End: 2021-05-03

## 2021-05-03 DIAGNOSIS — Z20.828 EXPOSURE TO SARS-ASSOCIATED CORONAVIRUS: Primary | ICD-10-CM

## 2021-05-03 DIAGNOSIS — Z20.828 EXPOSURE TO SARS-ASSOCIATED CORONAVIRUS: ICD-10-CM

## 2021-05-03 PROCEDURE — U0005 INFEC AGEN DETEC AMPLI PROBE: HCPCS

## 2021-05-03 PROCEDURE — U0003 INFECTIOUS AGENT DETECTION BY NUCLEIC ACID (DNA OR RNA); SEVERE ACUTE RESPIRATORY SYNDROME CORONAVIRUS 2 (SARS-COV-2) (CORONAVIRUS DISEASE [COVID-19]), AMPLIFIED PROBE TECHNIQUE, MAKING USE OF HIGH THROUGHPUT TECHNOLOGIES AS DESCRIBED BY CMS-2020-01-R: HCPCS

## 2021-05-04 LAB — SARS-COV-2 RNA RESP QL NAA+PROBE: NEGATIVE

## 2021-06-03 DIAGNOSIS — F41.9 ANXIETY: ICD-10-CM

## 2021-06-03 RX ORDER — ALPRAZOLAM 0.25 MG/1
TABLET ORAL
Qty: 45 TABLET | Refills: 0 | Status: SHIPPED | OUTPATIENT
Start: 2021-06-03 | End: 2021-09-08

## 2021-06-03 NOTE — TELEPHONE ENCOUNTER
Medication:  PDMP   04/13/2021  1  12/08/2020  ALPRAZOLAM 0 25 MG TABLET  45 0  30  TI SHE        Active agreement on file -No

## 2021-07-07 DIAGNOSIS — E78.2 MIXED HYPERLIPIDEMIA: ICD-10-CM

## 2021-07-08 RX ORDER — ROSUVASTATIN CALCIUM 10 MG/1
TABLET, COATED ORAL
Qty: 90 TABLET | Refills: 0 | Status: SHIPPED | OUTPATIENT
Start: 2021-07-08 | End: 2021-08-24 | Stop reason: SDUPTHER

## 2021-08-13 ENCOUNTER — APPOINTMENT (OUTPATIENT)
Dept: LAB | Age: 56
End: 2021-08-13
Payer: COMMERCIAL

## 2021-08-13 DIAGNOSIS — E03.9 ACQUIRED HYPOTHYROIDISM: ICD-10-CM

## 2021-08-13 DIAGNOSIS — E78.2 MIXED HYPERLIPIDEMIA: ICD-10-CM

## 2021-08-13 DIAGNOSIS — R73.09 ABNORMAL GLUCOSE: ICD-10-CM

## 2021-08-13 LAB
ALBUMIN SERPL BCP-MCNC: 3.2 G/DL (ref 3.5–5)
ALP SERPL-CCNC: 81 U/L (ref 46–116)
ALT SERPL W P-5'-P-CCNC: 51 U/L (ref 12–78)
ANION GAP SERPL CALCULATED.3IONS-SCNC: 4 MMOL/L (ref 4–13)
AST SERPL W P-5'-P-CCNC: 30 U/L (ref 5–45)
BILIRUB SERPL-MCNC: 1 MG/DL (ref 0.2–1)
BUN SERPL-MCNC: 21 MG/DL (ref 5–25)
CALCIUM ALBUM COR SERPL-MCNC: 9.1 MG/DL (ref 8.3–10.1)
CALCIUM SERPL-MCNC: 8.5 MG/DL (ref 8.3–10.1)
CHLORIDE SERPL-SCNC: 106 MMOL/L (ref 100–108)
CHOLEST SERPL-MCNC: 226 MG/DL (ref 50–200)
CO2 SERPL-SCNC: 27 MMOL/L (ref 21–32)
CREAT SERPL-MCNC: 1.01 MG/DL (ref 0.6–1.3)
EST. AVERAGE GLUCOSE BLD GHB EST-MCNC: 120 MG/DL
GFR SERPL CREATININE-BSD FRML MDRD: 82 ML/MIN/1.73SQ M
GLUCOSE P FAST SERPL-MCNC: 97 MG/DL (ref 65–99)
HBA1C MFR BLD: 5.8 %
HDLC SERPL-MCNC: 6 MG/DL
LDLC SERPL CALC-MCNC: 181 MG/DL (ref 0–100)
POTASSIUM SERPL-SCNC: 4.1 MMOL/L (ref 3.5–5.3)
PROT SERPL-MCNC: 7.6 G/DL (ref 6.4–8.2)
SODIUM SERPL-SCNC: 137 MMOL/L (ref 136–145)
TRIGL SERPL-MCNC: 197 MG/DL
TSH SERPL DL<=0.05 MIU/L-ACNC: 0.72 UIU/ML (ref 0.36–3.74)

## 2021-08-13 PROCEDURE — 36415 COLL VENOUS BLD VENIPUNCTURE: CPT

## 2021-08-13 PROCEDURE — 83036 HEMOGLOBIN GLYCOSYLATED A1C: CPT

## 2021-08-13 PROCEDURE — 80061 LIPID PANEL: CPT

## 2021-08-13 PROCEDURE — 80053 COMPREHEN METABOLIC PANEL: CPT

## 2021-08-13 PROCEDURE — 84443 ASSAY THYROID STIM HORMONE: CPT

## 2021-08-24 ENCOUNTER — OFFICE VISIT (OUTPATIENT)
Dept: FAMILY MEDICINE CLINIC | Facility: CLINIC | Age: 56
End: 2021-08-24
Payer: COMMERCIAL

## 2021-08-24 VITALS
OXYGEN SATURATION: 98 % | HEIGHT: 70 IN | SYSTOLIC BLOOD PRESSURE: 116 MMHG | WEIGHT: 232.2 LBS | HEART RATE: 69 BPM | RESPIRATION RATE: 18 BRPM | DIASTOLIC BLOOD PRESSURE: 60 MMHG | BODY MASS INDEX: 33.24 KG/M2 | TEMPERATURE: 98 F

## 2021-08-24 DIAGNOSIS — Z00.00 ANNUAL PHYSICAL EXAM: Primary | ICD-10-CM

## 2021-08-24 DIAGNOSIS — E78.2 MIXED HYPERLIPIDEMIA: ICD-10-CM

## 2021-08-24 DIAGNOSIS — Z12.5 SCREENING FOR PROSTATE CANCER: ICD-10-CM

## 2021-08-24 PROBLEM — M25.512 ACUTE PAIN OF LEFT SHOULDER: Status: RESOLVED | Noted: 2021-01-28 | Resolved: 2021-08-24

## 2021-08-24 PROCEDURE — 3008F BODY MASS INDEX DOCD: CPT | Performed by: FAMILY MEDICINE

## 2021-08-24 PROCEDURE — 99396 PREV VISIT EST AGE 40-64: CPT | Performed by: FAMILY MEDICINE

## 2021-08-24 PROCEDURE — 1036F TOBACCO NON-USER: CPT | Performed by: FAMILY MEDICINE

## 2021-08-24 PROCEDURE — 3725F SCREEN DEPRESSION PERFORMED: CPT | Performed by: FAMILY MEDICINE

## 2021-08-24 RX ORDER — ROSUVASTATIN CALCIUM 20 MG/1
20 TABLET, COATED ORAL DAILY
Qty: 90 TABLET | Refills: 3 | Status: SHIPPED | OUTPATIENT
Start: 2021-08-24

## 2021-08-24 NOTE — PROGRESS NOTES
ADULT ANNUAL [address]    NAME: Damián Grey  AGE: 61 y o  SEX: male  : 1965     DATE: 2021     Assessment and Plan:     Problem List Items Addressed This Visit        Other    Hyperlipidemia    Relevant Medications    rosuvastatin (CRESTOR) 20 MG tablet    Other Relevant Orders    Comprehensive metabolic panel    Lipid Panel with Direct LDL reflex    Screening for prostate cancer    Relevant Orders    PSA, Total Screen      Other Visit Diagnoses     Annual physical exam    -  Primary          Immunizations and preventive care screenings were discussed with patient today  Appropriate education was printed on patient's after visit summary  Counseling:  · Dental Health: discussed importance of regular tooth brushing, flossing, and dental visits  Return in 4 months (on 2021)  Chief Complaint:     Chief Complaint   Patient presents with    Annual Exam     Patient here for annual wellness exam       History of Present Illness:     Adult Annual Physical   Patient here for a comprehensive physical exam  The patient reports problems - labs reivewed- wasn't exercising for 6-7 month  Diet and Physical Activity  · Diet/Nutrition: poor diet  · Exercise: moderate cardiovascular exercise and just restarted exercise  Depression Screening  PHQ-9 Depression Screening    PHQ-9:   Frequency of the following problems over the past two weeks:      Little interest or pleasure in doing things: 0 - not at all  Feeling down, depressed, or hopeless: 0 - not at all  PHQ-2 Score: 0       General Health  · Sleep: sleeps well  · Hearing: normal - bilateral   · Vision: no vision problems  · Dental: regular dental visits   Health  · Symptoms include: none     Review of Systems:     Review of Systems   Constitutional: Negative  HENT: Negative  Eyes: Negative  Respiratory: Negative  Cardiovascular: Negative  Gastrointestinal: Negative  Endocrine: Negative  Genitourinary: Negative  Musculoskeletal: Negative  Skin: Negative  Allergic/Immunologic: Negative  Neurological: Negative  Hematological: Negative  Psychiatric/Behavioral: Negative  Past Medical History:     Past Medical History:   Diagnosis Date    GERD (gastroesophageal reflux disease)     Pulmonary embolism ([facility] [address] )     last assessed 1/10/2013    Snoring 2016      Past Surgical History:     Past Surgical History:   Procedure Laterality Date    TONSILLECTOMY AND ADENOIDECTOMY        Family History:     Family History   Problem Relation Age of Onset   Aristeo Araiza Diabetes Mother         DM    Hypothyroidism Mother    Aristeo Araiza Glaucoma Mother     Hypertension Mother     Alcohol abuse Sister     Drug abuse Sister    Aristeo Araiza Hypothyroidism Sister     Lung cancer Maternal Grandmother       Social History:     Social History     Socioeconomic History    Marital status: /Civil Union     Spouse name: None    Number of children: 3    Years of education: None    Highest education level: None   Occupational History    Occupation:      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    Tobacco Use    Smoking status: Former Smoker     Quit date:      Years since quittin 6    Smokeless tobacco: Never Used   Substance and Sexual Activity    Alcohol use: Yes     Comment: occ    Drug use: No    Sexual activity: Yes     Partners: Female   Other Topics Concern    None   Social History Narrative    Recreational activities martial arts      Social Determinants of Health     Financial Resource Strain:     Difficulty of Paying Living Expenses:    Food Insecurity:     Worried About Running Out of Food in the Last Year:     Ran Out of Food in the Last Year:    Transportation Needs:     Lack of Transportation (Medical):      Lack of Transportation (Non-Medical):    Physical Activity:     Days of Exercise per Week:     Minutes of Exercise per Session:    Stress:     Feeling of Stress :    Social Connections:     Frequency of Communication with Friends and Family:     Frequency of Social Gatherings with Friends and Family:     Attends Hinduism Services:     Active Member of Clubs or Organizations:     Attends Club or Organization Meetings:     Marital Status:    Intimate Partner Violence:     Fear of Current or Ex-Partner:     Emotionally Abused:     Physically Abused:     Sexually Abused:       Current Medications:     Current Outpatient Medications   Medication Sig Dispense Refill    ALPRAZolam (XANAX) 0 25 mg tablet TAKE 1 1/2 TABS BY MOUTH AT BEDTIME 45 tablet 0    aspirin 81 MG tablet Take 1 tablet by mouth daily      cyclobenzaprine (FLEXERIL) 10 mg tablet Take 1 tablet (10 mg total) by mouth 3 (three) times a day as needed for muscle spasms 30 tablet 0    levothyroxine 112 mcg tablet Take 1 tablet (112 mcg total) by mouth daily in the early morning 90 tablet 3    Multiple Vitamins-Minerals (CENTRUM SILVER 50+MEN PO) Take 1 tablet by mouth daily      omeprazole (PriLOSEC) 40 MG capsule Take 1 capsule (40 mg total) by mouth daily 90 capsule 3    rosuvastatin (CRESTOR) 20 MG tablet Take 1 tablet (20 mg total) by mouth daily 90 tablet 3     No current facility-administered medications for this visit  Allergies: Allergies   Allergen Reactions    Esomeprazole       Physical Exam:     /60   Pulse 69   Temp 98 °F (36 7 °C)   Resp 18   Ht 5' 10" (1 778 m)   Wt 105 kg (232 lb 3 2 oz)   SpO2 98%   BMI 33 32 kg/m²     Physical Exam  Vitals and nursing note reviewed  Constitutional:       Appearance: Normal appearance  He is well-developed  HENT:      Head: Normocephalic and atraumatic  Right Ear: External ear normal       Left Ear: External ear normal       Nose: Nose normal    Eyes:      Extraocular Movements: Extraocular movements intact        Conjunctiva/sclera: Conjunctivae normal  Pupils: Pupils are equal, round, and reactive to light  Cardiovascular:      Rate and Rhythm: Normal rate and regular rhythm  Heart sounds: Normal heart sounds  Pulmonary:      Effort: Pulmonary effort is normal       Breath sounds: Normal breath sounds  Abdominal:      General: Abdomen is flat  Bowel sounds are normal       Palpations: Abdomen is soft  Musculoskeletal:         General: Normal range of motion  Cervical back: Normal range of motion and neck supple  Skin:     General: Skin is warm and dry  Capillary Refill: Capillary refill takes less than 2 seconds  Neurological:      General: No focal deficit present  Mental Status: He is alert and oriented to person, place, and time  Psychiatric:         Mood and Affect: Mood normal          Behavior: Behavior normal          Thought Content:  Thought content normal          Judgment: Judgment normal           Raleigh Ok, DO  [address]

## 2021-08-24 NOTE — PATIENT INSTRUCTIONS

## 2021-09-07 DIAGNOSIS — R12 HEARTBURN: ICD-10-CM

## 2021-09-08 RX ORDER — OMEPRAZOLE 40 MG/1
CAPSULE, DELAYED RELEASE ORAL
Qty: 90 CAPSULE | Refills: 0 | Status: SHIPPED | OUTPATIENT
Start: 2021-09-08 | End: 2021-11-28

## 2021-10-27 PROCEDURE — U0003 INFECTIOUS AGENT DETECTION BY NUCLEIC ACID (DNA OR RNA); SEVERE ACUTE RESPIRATORY SYNDROME CORONAVIRUS 2 (SARS-COV-2) (CORONAVIRUS DISEASE [COVID-19]), AMPLIFIED PROBE TECHNIQUE, MAKING USE OF HIGH THROUGHPUT TECHNOLOGIES AS DESCRIBED BY CMS-2020-01-R: HCPCS | Performed by: PREVENTIVE MEDICINE

## 2021-10-27 PROCEDURE — U0005 INFEC AGEN DETEC AMPLI PROBE: HCPCS | Performed by: PREVENTIVE MEDICINE

## 2021-11-13 ENCOUNTER — IMMUNIZATIONS (OUTPATIENT)
Dept: FAMILY MEDICINE CLINIC | Facility: HOSPITAL | Age: 56
End: 2021-11-13

## 2021-11-13 DIAGNOSIS — Z23 ENCOUNTER FOR IMMUNIZATION: Primary | ICD-10-CM

## 2021-11-13 PROCEDURE — 91300 COVID-19 PFIZER VACC 0.3 ML: CPT

## 2021-11-13 PROCEDURE — 0001A COVID-19 PFIZER VACC 0.3 ML: CPT

## 2021-11-28 DIAGNOSIS — R12 HEARTBURN: ICD-10-CM

## 2021-11-28 RX ORDER — OMEPRAZOLE 40 MG/1
CAPSULE, DELAYED RELEASE ORAL
Qty: 90 CAPSULE | Refills: 0 | Status: SHIPPED | OUTPATIENT
Start: 2021-11-28 | End: 2022-03-09

## 2022-01-23 DIAGNOSIS — E03.9 HYPOTHYROIDISM, UNSPECIFIED TYPE: ICD-10-CM

## 2022-01-23 RX ORDER — LEVOTHYROXINE SODIUM 112 UG/1
TABLET ORAL
Qty: 90 TABLET | Refills: 0 | Status: SHIPPED | OUTPATIENT
Start: 2022-01-23 | End: 2022-02-24

## 2022-02-24 DIAGNOSIS — E03.9 HYPOTHYROIDISM, UNSPECIFIED TYPE: ICD-10-CM

## 2022-02-24 RX ORDER — LEVOTHYROXINE SODIUM 112 UG/1
TABLET ORAL
Qty: 30 TABLET | Refills: 0 | Status: SHIPPED | OUTPATIENT
Start: 2022-02-24 | End: 2022-07-22 | Stop reason: SDUPTHER

## 2022-03-07 ENCOUNTER — APPOINTMENT (OUTPATIENT)
Dept: LAB | Age: 57
End: 2022-03-07
Payer: COMMERCIAL

## 2022-03-07 DIAGNOSIS — E78.2 MIXED HYPERLIPIDEMIA: ICD-10-CM

## 2022-03-07 DIAGNOSIS — Z12.5 SCREENING FOR PROSTATE CANCER: ICD-10-CM

## 2022-03-07 DIAGNOSIS — E03.9 ACQUIRED HYPOTHYROIDISM: ICD-10-CM

## 2022-03-07 LAB
ALBUMIN SERPL BCP-MCNC: 4 G/DL (ref 3.5–5)
ALP SERPL-CCNC: 52 U/L (ref 46–116)
ALT SERPL W P-5'-P-CCNC: 67 U/L (ref 12–78)
ANION GAP SERPL CALCULATED.3IONS-SCNC: 3 MMOL/L (ref 4–13)
AST SERPL W P-5'-P-CCNC: 31 U/L (ref 5–45)
BILIRUB SERPL-MCNC: 0.59 MG/DL (ref 0.2–1)
BUN SERPL-MCNC: 21 MG/DL (ref 5–25)
CALCIUM SERPL-MCNC: 8.9 MG/DL (ref 8.3–10.1)
CHLORIDE SERPL-SCNC: 105 MMOL/L (ref 100–108)
CHOLEST SERPL-MCNC: 126 MG/DL
CO2 SERPL-SCNC: 28 MMOL/L (ref 21–32)
CREAT SERPL-MCNC: 0.98 MG/DL (ref 0.6–1.3)
GFR SERPL CREATININE-BSD FRML MDRD: 84 ML/MIN/1.73SQ M
GLUCOSE P FAST SERPL-MCNC: 111 MG/DL (ref 65–99)
HDLC SERPL-MCNC: 23 MG/DL
LDLC SERPL CALC-MCNC: 72 MG/DL (ref 0–100)
POTASSIUM SERPL-SCNC: 4.1 MMOL/L (ref 3.5–5.3)
PROT SERPL-MCNC: 8.1 G/DL (ref 6.4–8.2)
PSA SERPL-MCNC: 0.7 NG/ML (ref 0–4)
SODIUM SERPL-SCNC: 136 MMOL/L (ref 136–145)
TRIGL SERPL-MCNC: 156 MG/DL

## 2022-03-07 PROCEDURE — 84443 ASSAY THYROID STIM HORMONE: CPT

## 2022-03-07 PROCEDURE — 80053 COMPREHEN METABOLIC PANEL: CPT

## 2022-03-07 PROCEDURE — 80061 LIPID PANEL: CPT

## 2022-03-07 PROCEDURE — 36415 COLL VENOUS BLD VENIPUNCTURE: CPT

## 2022-03-07 PROCEDURE — G0103 PSA SCREENING: HCPCS

## 2022-03-09 ENCOUNTER — OFFICE VISIT (OUTPATIENT)
Dept: FAMILY MEDICINE CLINIC | Facility: CLINIC | Age: 57
End: 2022-03-09
Payer: COMMERCIAL

## 2022-03-09 VITALS
WEIGHT: 224 LBS | HEIGHT: 70 IN | HEART RATE: 74 BPM | DIASTOLIC BLOOD PRESSURE: 80 MMHG | TEMPERATURE: 98 F | BODY MASS INDEX: 32.07 KG/M2 | RESPIRATION RATE: 14 BRPM | OXYGEN SATURATION: 98 % | SYSTOLIC BLOOD PRESSURE: 126 MMHG

## 2022-03-09 DIAGNOSIS — R12 HEARTBURN: ICD-10-CM

## 2022-03-09 DIAGNOSIS — E03.9 ACQUIRED HYPOTHYROIDISM: Primary | ICD-10-CM

## 2022-03-09 DIAGNOSIS — R73.09 ABNORMAL GLUCOSE: ICD-10-CM

## 2022-03-09 DIAGNOSIS — Z11.4 SCREENING FOR HIV (HUMAN IMMUNODEFICIENCY VIRUS): ICD-10-CM

## 2022-03-09 DIAGNOSIS — E78.2 MIXED HYPERLIPIDEMIA: ICD-10-CM

## 2022-03-09 LAB — TSH SERPL DL<=0.05 MIU/L-ACNC: 1.79 UIU/ML (ref 0.36–3.74)

## 2022-03-09 PROCEDURE — 1036F TOBACCO NON-USER: CPT | Performed by: FAMILY MEDICINE

## 2022-03-09 PROCEDURE — 3725F SCREEN DEPRESSION PERFORMED: CPT | Performed by: FAMILY MEDICINE

## 2022-03-09 PROCEDURE — 3008F BODY MASS INDEX DOCD: CPT | Performed by: FAMILY MEDICINE

## 2022-03-09 PROCEDURE — 99214 OFFICE O/P EST MOD 30 MIN: CPT | Performed by: FAMILY MEDICINE

## 2022-03-09 RX ORDER — OMEPRAZOLE 40 MG/1
CAPSULE, DELAYED RELEASE ORAL
Qty: 90 CAPSULE | Refills: 0 | Status: SHIPPED | OUTPATIENT
Start: 2022-03-09

## 2022-03-09 NOTE — PROGRESS NOTES
Assessment/Plan:    1  Acquired hypothyroidism  Assessment & Plan:  Check tsh    Orders:  -     TSH, 3rd generation with Free T4 reflex; Future    2  Mixed hyperlipidemia  Assessment & Plan:  Stable on crestor    Orders:  -     Comprehensive metabolic panel; Future; Expected date: 07/01/2022  -     Lipid Panel with Direct LDL reflex; Future; Expected date: 07/01/2022  -     TSH, 3rd generation with Free T4 reflex; Future; Expected date: 07/01/2022    3  Abnormal glucose  Assessment & Plan:  Check a1c    Orders:  -     Hemoglobin A1C; Future; Expected date: 07/01/2022    4  Screening for HIV (human immunodeficiency virus)  -     HIV 1/2 Antigen/Antibody (4th Generation) w Reflex [facility]; Future    BMI Counseling: Body mass index is 32 14 kg/m²  The BMI is above normal  Nutrition recommendations include encouraging healthy choices of fruits and vegetables  Exercise recommendations include exercising 3-5 times per week  No pharmacotherapy was ordered  Rationale for BMI follow-up plan is due to patient being overweight or obese  Depression Screening and Follow-up Plan: Patient was screened for depression during today's encounter  They screened negative with a PHQ-2 score of 0  There are no Patient Instructions on file for this visit  Return in about 5 years (around 3/9/2027) for Annual physical     Subjective:      Patient ID: Peña Hardy is a 61 y o  male  Chief Complaint   Patient presents with    Follow-up     Patient here for follow up on Hypothyroidism        Here for follow up  Eating sweets and snacks  Labs improved    Hyperlipidemia  This is a chronic problem  The current episode started more than 1 year ago  The problem is controlled  Recent lipid tests were reviewed and are normal  There are no known factors aggravating his hyperlipidemia  Associated symptoms include myalgias  Current antihyperlipidemic treatment includes statins         The following portions of the patient's history were reviewed and updated as appropriate: allergies, current medications, past family history, past medical history, past social history, past surgical history and problem list     Review of Systems   Constitutional: Negative  HENT: Negative  Eyes: Negative  Respiratory: Negative  Cardiovascular: Negative  Gastrointestinal: Negative  Endocrine: Negative  Genitourinary: Negative  Musculoskeletal: Positive for myalgias and neck pain  Skin: Negative  Allergic/Immunologic: Negative  Neurological: Negative  Hematological: Negative  Psychiatric/Behavioral: Negative  Current Outpatient Medications   Medication Sig Dispense Refill    ALPRAZolam (XANAX) 0 25 mg tablet TAKE 1 1/2 TABS BY MOUTH AT BEDTIME 45 tablet 5    aspirin 81 MG tablet Take 1 tablet by mouth daily      cyclobenzaprine (FLEXERIL) 10 mg tablet Take 1 tablet (10 mg total) by mouth 3 (three) times a day as needed for muscle spasms 30 tablet 0    levothyroxine 112 mcg tablet TAKE ONE TABLET BY MOUTH DAILY IN THE EARLY MORNING 30 tablet 0    Multiple Vitamins-Minerals (CENTRUM SILVER 50+MEN PO) Take 1 tablet by mouth daily      omeprazole (PriLOSEC) 40 MG capsule TAKE 1 CAPSULE BY MOUTH DAILY 90 capsule 0    rosuvastatin (CRESTOR) 20 MG tablet Take 1 tablet (20 mg total) by mouth daily 90 tablet 3     No current facility-administered medications for this visit  Objective:    /80 (BP Location: Left arm, Patient Position: Sitting, Cuff Size: Large)   Pulse 74   Temp 98 °F (36 7 °C)   Resp 14   Ht 5' 10" (1 778 m)   Wt 102 kg (224 lb)   SpO2 98%   BMI 32 14 kg/m²        Physical Exam  Vitals and nursing note reviewed  Constitutional:       Appearance: Normal appearance  HENT:      Head: Normocephalic and atraumatic  Eyes:      Extraocular Movements: Extraocular movements intact  Pupils: Pupils are equal, round, and reactive to light     Cardiovascular:      Rate and Rhythm: Normal rate and regular rhythm  Pulses: Normal pulses  Heart sounds: Normal heart sounds  Pulmonary:      Effort: Pulmonary effort is normal       Breath sounds: Normal breath sounds  Abdominal:      General: Abdomen is flat  Palpations: Abdomen is soft  Musculoskeletal:      Cervical back: Normal range of motion and neck supple  Skin:     General: Skin is warm  Capillary Refill: Capillary refill takes less than 2 seconds  Neurological:      General: No focal deficit present  Mental Status: He is alert and oriented to person, place, and time  Psychiatric:         Mood and Affect: Mood normal          Behavior: Behavior normal          Thought Content:  Thought content normal          Judgment: Judgment normal                 Peggy Kelly DO

## 2022-06-14 ENCOUNTER — OFFICE VISIT (OUTPATIENT)
Dept: OBGYN CLINIC | Facility: OTHER | Age: 57
End: 2022-06-14
Payer: COMMERCIAL

## 2022-06-14 VITALS
DIASTOLIC BLOOD PRESSURE: 80 MMHG | WEIGHT: 220.1 LBS | SYSTOLIC BLOOD PRESSURE: 119 MMHG | HEART RATE: 71 BPM | HEIGHT: 70 IN | BODY MASS INDEX: 31.51 KG/M2

## 2022-06-14 DIAGNOSIS — M24.812 INTERNAL DERANGEMENT OF SHOULDER, LEFT: Primary | ICD-10-CM

## 2022-06-14 PROCEDURE — 3008F BODY MASS INDEX DOCD: CPT | Performed by: ORTHOPAEDIC SURGERY

## 2022-06-14 PROCEDURE — 1036F TOBACCO NON-USER: CPT | Performed by: ORTHOPAEDIC SURGERY

## 2022-06-14 PROCEDURE — 99214 OFFICE O/P EST MOD 30 MIN: CPT | Performed by: ORTHOPAEDIC SURGERY

## 2022-06-14 NOTE — PROGRESS NOTES
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) and a subacromial corticosteroid injection they are indicated at this time for an MRI scan to evaluate for surgical pathology  We will review the results of the study when it has been obtained and discuss further treatment options  Subjective:   Patient ID: Mani So is a 61 y o  male      HPI    Patient present today for follow up of left shoulder impingement  He was seen last year and provided with a CS injection and referral to PT  Patient reports he has been keeping up with his HEP and has persistent pain  Worse with activities improved with rest    The following portions of the patient's history were reviewed and updated as appropriate: allergies, current medications, past family history, past medical history, past social history, past surgical history and problem list       Objective:  /80 (BP Location: Right arm, Patient Position: Sitting, Cuff Size: Adult)   Pulse 71   Ht 5' 10" (1 778 m)   Wt 99 8 kg (220 lb 1 6 oz)   BMI 31 58 kg/m²       Left Shoulder Exam     Tenderness   The patient is experiencing no tenderness  Range of Motion   The patient has normal left shoulder ROM  Muscle Strength   Abduction: 4/5   External rotation: 5/5     Tests   Graf test: positive  Impingement: positive    Other   Erythema: absent  Sensation: normal  Pulse: present     Comments:    Positive empty can        I have personally reviewed pertinent films in PACS and my interpretation is as follows  Left shoulder x-ray from 2/04/21 demonstrates no fracture or dislocation, AC degenerative changes       Scribe Attestation    I,:  Tammy Barros am acting as a scribe while in the presence of the attending physician :       I,:  Helene Steve MD personally performed the services described in this documentation    as scribed in my presence :

## 2022-06-14 NOTE — LETTER
June 14, 2022     Patient: Rudy Rachel  YOB: 1965  Date of Visit: 6/14/2022      To Whom it May Concern:    Oscar Hsu is under my professional care  Christiano Burrows was seen in my office on 6/14/2022  If you have any questions or concerns, please don't hesitate to call           Sincerely,          Yasemin Jamison MD        CC: No Recipients

## 2022-06-29 ENCOUNTER — HOSPITAL ENCOUNTER (OUTPATIENT)
Dept: RADIOLOGY | Facility: HOSPITAL | Age: 57
Discharge: HOME | End: 2022-06-29
Attending: ORTHOPAEDIC SURGERY
Payer: COMMERCIAL

## 2022-06-29 DIAGNOSIS — M24.812 INTERNAL DERANGEMENT OF SHOULDER, LEFT: ICD-10-CM

## 2022-06-29 PROCEDURE — 73221 MRI JOINT UPR EXTREM W/O DYE: CPT

## 2022-06-29 PROCEDURE — G1004 CDSM NDSC: HCPCS

## 2022-07-12 ENCOUNTER — OFFICE VISIT (OUTPATIENT)
Dept: OBGYN CLINIC | Facility: OTHER | Age: 57
End: 2022-07-12
Payer: COMMERCIAL

## 2022-07-12 VITALS
HEART RATE: 73 BPM | DIASTOLIC BLOOD PRESSURE: 80 MMHG | SYSTOLIC BLOOD PRESSURE: 130 MMHG | HEIGHT: 70 IN | BODY MASS INDEX: 32.44 KG/M2 | WEIGHT: 226.6 LBS

## 2022-07-12 DIAGNOSIS — M75.102 TEAR OF LEFT SUPRASPINATUS TENDON: Primary | ICD-10-CM

## 2022-07-12 PROCEDURE — 99214 OFFICE O/P EST MOD 30 MIN: CPT | Performed by: ORTHOPAEDIC SURGERY

## 2022-07-12 RX ORDER — CHLORHEXIDINE GLUCONATE 0.12 MG/ML
15 RINSE ORAL ONCE
Status: CANCELLED | OUTPATIENT
Start: 2022-07-12 | End: 2022-07-12

## 2022-07-12 NOTE — PROGRESS NOTES
Assessment  Diagnoses and all orders for this visit:    Tear of left supraspinatus tendon      Discussion and Plan:    The patient does have a full-thickness anterior supraspinatus tendon tear with minimal retraction and no muscle atrophy  This is something he has failed to improve with appropriate nonoperative care including physical therapy and the patient is indicated for arthroscopic repair of his left rotator cuff  He does wish to proceed forward scheduling as he does not wish to continue with nonoperative care given his lack of improvement up to this point  A thorough discussion was performed with the patient regarding the risks and benefit of operative and nonoperative treatment of their rotator cuff tear  Risks discussed include but were not limited to infection, neurovascular injury, recurrent tear, nonhealing of the repair, need for further surgery, need for biceps tenodesis or tenotomy, stiffness, need for prolonged rehabilitation, as well as the risk of anesthesia  After this discussion all questions were answered and informed consent was obtained in the office for arthroscopic rotator cuff repair of the right shoulder  The patient will be scheduled for this procedure accordingly  Subjective:   Patient ID: Pretty Thorne is a 61 y o  male      HPI  Patient presents today to discuss the findings of his left shoulder MRI  He was seen last year and provided with a CS injection and referral to PT  Patient reports he has been keeping up with his HEP and has persistent pain  Worse with activities improved with rest      The following portions of the patient's history were reviewed and updated as appropriate: allergies, current medications, past family history, past medical history, past social history, past surgical history and problem list     Review of Systems   Constitutional: Negative for chills and fever  HENT: Negative for drooling and hearing loss      Eyes: Negative for visual disturbance  Respiratory: Negative for cough and shortness of breath  Cardiovascular: Negative for chest pain  Gastrointestinal: Negative for abdominal pain  Skin: Negative for rash  Psychiatric/Behavioral: Negative for agitation  Objective:  /80 (BP Location: Right arm, Patient Position: Sitting, Cuff Size: Adult)   Pulse 73   Ht 5' 10" (1 778 m)   Wt 103 kg (226 lb 9 6 oz)   BMI 32 51 kg/m²     Left Shoulder Exam      Tenderness   The patient is experiencing no tenderness       Range of Motion   The patient has normal left shoulder ROM     Muscle Strength   Abduction: 4/5   External rotation: 5/5      Tests   Graf test: positive  Impingement: positive     Other   Erythema: absent  Sensation: normal  Pulse: present      Comments:    Positive empty can    Physical Exam  Vitals and nursing note reviewed  Constitutional:       Appearance: He is well-developed  HENT:      Head: Normocephalic and atraumatic  Eyes:      Pupils: Pupils are equal, round, and reactive to light  Cardiovascular:      Rate and Rhythm: Normal rate and regular rhythm  Pulses: Normal pulses  Heart sounds: Normal heart sounds  Pulmonary:      Effort: Pulmonary effort is normal  No respiratory distress  Breath sounds: Normal breath sounds  Abdominal:      General: There is no distension  Palpations: Abdomen is soft  Musculoskeletal:      Cervical back: Normal range of motion and neck supple  Skin:     General: Skin is warm and dry  Neurological:      Mental Status: He is alert and oriented to person, place, and time  Psychiatric:         Behavior: Behavior normal            I have personally reviewed pertinent films in PACS and my interpretation is as follows  Left shoulder MRI demonstrates full thickness anterior supraspinatus tear, retract 1 7 cm, no atrophy      Scribe Attestation    I,:  Antonette am acting as a scribe while in the presence of the attending physician : I,:  Gloria Sena MD personally performed the services described in this documentation    as scribed in my presence :

## 2022-07-12 NOTE — PATIENT INSTRUCTIONS
You are being scheduled for a shoulder arthroscopy to treat your symptoms  Below are some instructions and information on what to expect before and after your surgery  Pre-Surgical Preparation for Arthroscopic Shoulder Surgery: You will be contacted the evening prior to your surgery to confirm the scheduled time of the procedure and when to arrive at the hospital    Do not eat or drink anything after midnight the night before your surgery  Since you are having out-patient surgery, make sure that you have someone who can drive you home later in the day  Also, prepare that person for a long day, as the process of safely preparing for and recovering from the procedure is more time consuming than the actual procedure! As you will be in a sling after surgery, please wear or bring a loose fitting button-down shirt so that you can easily place this over the sling when you leave the surgical suite  This avoids having to place the operative arm in a sleeve  Most patients find that this is the easiest outfit to wear for the first week or so after surgery so you may want to plan accordingly  Most patients find that lying down in bed after shoulder surgery accentuates their discomfort  This is likely related to the effect of gravity on the swelling in the shoulder  As a result, most patients sleep better in a recliner or in bed with pillows propped up behind their back for the first few days or weeks after surgery  It is a good idea to plan for this ahead of time so there will be less hassle getting things set up the night after surgery  What to Expect After Arthroscopic Shoulder Surgery: It is normal to have swelling and discomfort in the shoulder for several days or a week after surgery  It is also normal to have a small amount of drainage from the surgical wounds (especially the first few days after surgery), as we put fluid into the shoulder to visualize the structures during surgery  It is NOT normal to have foul smelling, purulent drainage and if this is noted, please contact the office immediately or proceed to the emergency room for evaluation as this may indicate an infection  Applying ice bags to the shoulder may help with pain that is not controlled by the regional block  Ice should be applied 20-30 minutes at a time, every hour or two  Make sure to put a thin towel or T-shirt next to your skin to avoid direct contact of the ice with the skin  Icing is most helpful in the first 48 hours, although many people find that continuing past this time frame lessens their postoperative pain  Please note that your post-operative dressing is not conductive to ice, so if you need to, it is okay to remove that dressing even the night after surgery and place band-aids over the wounds in order for the ice to take effect  Pain Control    Most patients will receive a nerve block, the local anesthetic may keep your whole arm numb for up to 4 days  You will be given a prescription for narcotic pain medication when you are discharged from the hospital   With the newer nerve block that is being utilized, patients are rarely requiring the use of this narcotic pain medication  If you find you do not tolerate that type of pain medicine well, call our office and we will try another one  In addition to the narcotic pain medication, it is safe to use an anti-inflammatory (unless the patient has a medical condition that would not allow safe use of this mediation)  This includes the Advil, Motrin, Ibuprofen and Alleve category of medications  Simply follow the over the counter dosing on the package and use as indicated as another adjunct  Importantly since these medications are all very similar, use only one of them  Tylenol is a separate medication that can be utilized as well and can be taken at the same time as the other medication or given in a "staggered" manner    Just make sure that you follow the dosing on the over the counter bottle instructions  Also make sure that the pain medication prescribed by Dr Senia Campbell team does not contain acetaminophen (this is found in Percocet and Vicodin)  Typically we do not prescribe those types of pain medications but if for some reason that has been prescribed DO NOT add more Tylenol (acetaminophen) as you could end up taking too much of that medication  As mentioned above, most patients find that lying down accentuates their discomfort  You might sleep better in a recliner, or propped up in bed  Dr Troy Lyn encourages patients to safely ambulate around the house as much as possible in the first few days after the procedure as this can help with blood circulation in the legs  While the incidence of blood clots is very rare following shoulder surgery, early ambulation is a great way to help decrease the already low rate  24 hours after the surgery you may remove the bandage and cover incisions with Band-Aids if needed  At that time you may shower, the wounds will have a surgical glue that will protect them from shower water but do not submerge your incisions directly (bathing or swimming) until at least 2 weeks post-operatively  It is safe to let the arm hang at the side and take a shower and put on a shirt without the sling on  Just make sure that you do not use the operative are to reach out and grab anything as that may damage the repair  When you are done showering and getting dressed please return the operative arm to the sling  Unless noted otherwise in your discharge paperwork, Dr Troy Lyn uses absorbable sutures so they do not need to be removed  Dr Jasmyne Coronel physician assistant (PA) will see you in the office a few days after the procedure to review the intra-operative findings and to initiate physical therapy if appropriate    A post-operative appointment should have been scheduled for you already, but if for some reason this did not happen, please call the office to make one  Physical therapy is important after nearly all shoulder surgeries and a detailed rehabilitation plan based on the specific intra-operative findings and procedures will be provided to your therapist at the first post-operative office visit  Most patients have post-operative therapy appointments scheduled pre-operatively, but if you do not, that will be handled at the first post-operative office visit  Unless expressly directed otherwise it is safe to remove the sling even the first day after the surgery and let the arm hang by the side  This allows patients to shower and even put a shirt on (bad arm in the sleeve first)  It is also safe to flex and extend their wrist, hand and fingers as much as possible when the block wears off  These simple motions can serve to pump fluid out of the forearm and decrease swelling in the arm

## 2022-07-18 ENCOUNTER — TELEPHONE (OUTPATIENT)
Dept: OBGYN CLINIC | Facility: OTHER | Age: 57
End: 2022-07-18

## 2022-07-18 ENCOUNTER — OFFICE VISIT (OUTPATIENT)
Dept: FAMILY MEDICINE CLINIC | Facility: CLINIC | Age: 57
End: 2022-07-18
Payer: COMMERCIAL

## 2022-07-18 VITALS
TEMPERATURE: 97.5 F | BODY MASS INDEX: 32.07 KG/M2 | SYSTOLIC BLOOD PRESSURE: 112 MMHG | HEART RATE: 78 BPM | DIASTOLIC BLOOD PRESSURE: 80 MMHG | HEIGHT: 70 IN | WEIGHT: 224 LBS | OXYGEN SATURATION: 98 % | RESPIRATION RATE: 16 BRPM

## 2022-07-18 DIAGNOSIS — R73.09 ABNORMAL GLUCOSE: ICD-10-CM

## 2022-07-18 DIAGNOSIS — E03.9 ACQUIRED HYPOTHYROIDISM: ICD-10-CM

## 2022-07-18 DIAGNOSIS — M75.102 TEAR OF LEFT SUPRASPINATUS TENDON: ICD-10-CM

## 2022-07-18 DIAGNOSIS — E78.2 MIXED HYPERLIPIDEMIA: ICD-10-CM

## 2022-07-18 DIAGNOSIS — Z01.818 PRE-OP EXAMINATION: Primary | ICD-10-CM

## 2022-07-18 PROCEDURE — 99214 OFFICE O/P EST MOD 30 MIN: CPT | Performed by: FAMILY MEDICINE

## 2022-07-18 NOTE — TELEPHONE ENCOUNTER
Patients PCP office called to see if patient needed any pre admission testing  Sending high priority as patient is in office now      C/b # [phone]

## 2022-07-18 NOTE — H&P (VIEW-ONLY)
FAMILY PRACTICE PRE-OPERATIVE EVALUATION  [facility] [place] [facility]    NAME: Truman Bar  AGE: 61 y o  SEX: male  : 1965     DATE: 2022    Family Practice Pre-Operative Evaluation      Chief Complaint: Pre-operative Evaluation     Surgery: left rotator cuff repain  Anticipated Date of Surgery: 2022  Surgeon: Juan Carlos Amaya      History of Present Illness:     Pre-op left shoulder rotator cuff repair  Dec ROM  Pain with movement  Pain now for 1 year, worse now for a couple months    Shoulder Pain   The pain is present in the left shoulder  This is a chronic problem  The current episode started more than 1 year ago  There has been no history of extremity trauma  The problem occurs constantly  The pain is at a severity of 8/10  The pain is moderate  Associated symptoms include a limited range of motion  He has tried acetaminophen (pt) for the symptoms  The treatment provided mild relief  His past medical history is significant for osteoarthritis  Anesthesia:  unsure  Bleeding Risk: no recent abnormal bleeding  Current Anti-platelet/anticoagulation medication: Aspirin    Assessment of Cardiac Risk:  Denies unstable or severe angina or MI in the last 6 weeks or history of stent placement in the last year   Denies decompensated heart failure (e g  New onset heart failure, NYHA functional class IV heart failure, or worsening existing heart failure)  Denies significant arrhythmias such as high grade AV block, symptomatic ventricular arrhythmia, newly recognized ventricular tachycardia, supraventricular tachycardia with resting heart rate >100, or symptomatic bradycardia  Denies severe heart valve disease including aortic stenosis or symptomatic mitral stenosis     Exercise Capacity:  Able to walk 4 blocks without symptoms?: Yes  Able to walk 2 flights without symptoms?: Yes    Prior Anesthesia Reactions: No     Personal history of venous thromboembolic disease?  Yes, 20 years ago    History of steroid use for >2 weeks within last year? No         Review of Systems:     Review of Systems   Constitutional: Negative  HENT: Negative  Eyes: Negative  Respiratory: Negative  Cardiovascular: Negative  Gastrointestinal: Negative  Endocrine: Negative  Genitourinary: Negative  Musculoskeletal: Positive for arthralgias (left shoulder pain)  Allergic/Immunologic: Negative  Neurological: Negative  Hematological: Negative  Psychiatric/Behavioral: Negative  Current Problem List:     Patient Active Problem List   Diagnosis    Abnormal glucose    Anxiety disorder    GERD (gastroesophageal reflux disease)    Hyperlipidemia    Hypothyroidism    Insomnia    Seborrheic keratosis    Umbilical hernia    Well adult exam    Screening for cardiovascular condition    Screening for diabetes mellitus    Screening for colon cancer    Screening for prostate cancer    Muscle spasm    Impingement syndrome of left shoulder    Radiculopathy, cervical region    Internal derangement of shoulder, left    Tear of left supraspinatus tendon    Pre-op examination       Allergies:      Allergies   Allergen Reactions    Esomeprazole        Current Medications:       Current Outpatient Medications:     ALPRAZolam (XANAX) 0 25 mg tablet, TAKE 1 1/2 TABS BY MOUTH AT BEDTIME, Disp: 45 tablet, Rfl: 3    aspirin 81 MG tablet, Take 1 tablet by mouth daily, Disp: , Rfl:     cyclobenzaprine (FLEXERIL) 10 mg tablet, Take 1 tablet (10 mg total) by mouth 3 (three) times a day as needed for muscle spasms, Disp: 30 tablet, Rfl: 0    levothyroxine 112 mcg tablet, TAKE ONE TABLET BY MOUTH DAILY IN THE EARLY MORNING, Disp: 30 tablet, Rfl: 0    Multiple Vitamins-Minerals (CENTRUM SILVER 50+MEN PO), Take 1 tablet by mouth daily, Disp: , Rfl:     omeprazole (PriLOSEC) 40 MG capsule, TAKE 1 CAPSULE BY MOUTH DAILY, Disp: 90 capsule, Rfl: 0    rosuvastatin (CRESTOR) 20 MG tablet, Take 1 tablet (20 mg total) by mouth daily, Disp: 90 tablet, Rfl: 3    Past Medical History:       Past Medical History:   Diagnosis Date    GERD (gastroesophageal reflux disease)     Pulmonary embolism (HCC)     last assessed 1/10/2013    Snoring 4/25/2016        Past Surgical History:   Procedure Laterality Date    TONSILLECTOMY AND ADENOIDECTOMY          Family History   Problem Relation Age of Onset    Diabetes Mother         DM    Hypothyroidism Mother    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    Tobacco Use    Smoking status: Former Smoker     Quit date: 2007     Years since quitting: 15 5    Smokeless tobacco: Never Used   Vaping Use    Vaping Use: Some days    Substances: THC   Substance and Sexual Activity    Alcohol use: Yes     Comment: occ    Drug use: No    Sexual activity: Yes     Partners: Female   Other Topics Concern    Not on file   Social History Narrative    Recreational activities martial arts      Social Determinants of Health     Financial Resource Strain: Not on file   Food Insecurity: Not on file   Transportation Needs: Not on file   Physical Activity: Not on file   Stress: Not on file   Social Connections: Not on file   Intimate Partner Violence: Not on file   Housing Stability: Not on file        Physical Exam:     /80 (BP Location: Left arm, Patient Position: Sitting, Cuff Size: Large)   Pulse 78   Temp 97 5 °F (36 4 °C) (Tympanic)   Resp 16   Ht 5' 10" (1 778 m)   Wt 102 kg (224 lb)   SpO2 98%   BMI 32 14 kg/m² Physical Exam  Vitals and nursing note reviewed  Constitutional:       Appearance: Normal appearance  HENT:      Head: Normocephalic and atraumatic  Eyes:      Extraocular Movements: Extraocular movements intact  Pupils: Pupils are equal, round, and reactive to light  Cardiovascular:      Rate and Rhythm: Normal rate and regular rhythm  Pulses: Normal pulses  Heart sounds: Normal heart sounds  Pulmonary:      Effort: Pulmonary effort is normal       Breath sounds: Normal breath sounds  Abdominal:      General: Abdomen is flat  Palpations: Abdomen is soft  Musculoskeletal:         General: Tenderness present  Cervical back: Normal range of motion and neck supple  Skin:     Capillary Refill: Capillary refill takes less than 2 seconds  Neurological:      General: No focal deficit present  Mental Status: He is alert and oriented to person, place, and time  Psychiatric:         Mood and Affect: Mood normal          Behavior: Behavior normal          Thought Content: Thought content normal          Judgment: Judgment normal           Data:     Pre-operative work-up    Laboratory Results: none needed     EKG: none needed    No results found for this or any previous visit (from the past 672 hour(s))  Assessment & Recommendations:     Problem List Items Addressed This Visit        Endocrine    Hypothyroidism     Check tsh            Musculoskeletal and Integument    Tear of left supraspinatus tendon     Schedule for rotator cuff surgery            Other    Abnormal glucose     Had improved  Check a1c         Hyperlipidemia     Check lipids         Pre-op examination - Primary          Pre-Op Evaluation Assessment  61 y o  male with planned surgery: rotator cuff surgery  Known risk factors for perioperative complications: None          Current medications which may produce withdrawal symptoms if withheld perioperatively: none- can take levo morning or proceedure  Pre-Op Evaluation Plan  1  Further preoperative workup as follows:   - None; no further preoperative work-up is required    2  Medication Management/Recommendations:   - None, continue medication regimen including morning of surgery, with sip of water    3  Prophylaxis for cardiac events with perioperative beta-blockers: not indicated  4  Patient requires further consultation with: None    Clearance  Patient is CLEARED for surgery without any additional cardiac testing       Yaritza Velázquez DO  [address] [address]  Phone#  [phone]  Fax#  [phone]

## 2022-07-18 NOTE — PROGRESS NOTES
[facility] PRE-OPERATIVE EVALUATION  [facility] PHYSICIAN GROUP - [facility]    NAME: Kelley Hamlin  AGE: 61 y o  SEX: male  : 1965     DATE: 2022    [facility] Pre-Operative Evaluation      Chief Complaint: Pre-operative Evaluation     Surgery: left rotator cuff repain  Anticipated Date of Surgery: 2022  Surgeon: Thang Schwartz      History of Present Illness:     Pre-op left shoulder rotator cuff repair  Dec ROM  Pain with movement  Pain now for 1 year, worse now for a couple months    Shoulder Pain   The pain is present in the left shoulder  This is a chronic problem  The current episode started more than 1 year ago  There has been no history of extremity trauma  The problem occurs constantly  The pain is at a severity of 8/10  The pain is moderate  Associated symptoms include a limited range of motion  He has tried acetaminophen (pt) for the symptoms  The treatment provided mild relief  His past medical history is significant for osteoarthritis  Anesthesia:  unsure  Bleeding Risk: no recent abnormal bleeding  Current Anti-platelet/anticoagulation medication: Aspirin    Assessment of Cardiac Risk:  Denies unstable or severe angina or MI in the last 6 weeks or history of stent placement in the last year   Denies decompensated heart failure (e g  New onset heart failure, NYHA functional class IV heart failure, or worsening existing heart failure)  Denies significant arrhythmias such as high grade AV block, symptomatic ventricular arrhythmia, newly recognized ventricular tachycardia, supraventricular tachycardia with resting heart rate >100, or symptomatic bradycardia  Denies severe heart valve disease including aortic stenosis or symptomatic mitral stenosis     Exercise Capacity:  Able to walk 4 blocks without symptoms?: Yes  Able to walk 2 flights without symptoms?: Yes    Prior Anesthesia Reactions: No     Personal history of venous thromboembolic disease?  Yes, 20 years ago    History of steroid use for >2 weeks within last year? No         Review of Systems:     Review of Systems   Constitutional: Negative  HENT: Negative  Eyes: Negative  Respiratory: Negative  Cardiovascular: Negative  Gastrointestinal: Negative  Endocrine: Negative  Genitourinary: Negative  Musculoskeletal: Positive for arthralgias (left shoulder pain)  Allergic/Immunologic: Negative  Neurological: Negative  Hematological: Negative  Psychiatric/Behavioral: Negative  Current Problem List:     Patient Active Problem List   Diagnosis    Abnormal glucose    Anxiety disorder    GERD (gastroesophageal reflux disease)    Hyperlipidemia    Hypothyroidism    Insomnia    Seborrheic keratosis    Umbilical hernia    Well adult exam    Screening for cardiovascular condition    Screening for diabetes mellitus    Screening for colon cancer    Screening for prostate cancer    Muscle spasm    Impingement syndrome of left shoulder    Radiculopathy, cervical region    Internal derangement of shoulder, left    Tear of left supraspinatus tendon    Pre-op examination       Allergies:      Allergies   Allergen Reactions    Esomeprazole        Current Medications:       Current Outpatient Medications:     ALPRAZolam (XANAX) 0 25 mg tablet, TAKE 1 1/2 TABS BY MOUTH AT BEDTIME, Disp: 45 tablet, Rfl: 3    aspirin 81 MG tablet, Take 1 tablet by mouth daily, Disp: , Rfl:     cyclobenzaprine (FLEXERIL) 10 mg tablet, Take 1 tablet (10 mg total) by mouth 3 (three) times a day as needed for muscle spasms, Disp: 30 tablet, Rfl: 0    levothyroxine 112 mcg tablet, TAKE ONE TABLET BY MOUTH DAILY IN THE EARLY MORNING, Disp: 30 tablet, Rfl: 0    Multiple Vitamins-Minerals (CENTRUM SILVER 50+MEN PO), Take 1 tablet by mouth daily, Disp: , Rfl:     omeprazole (PriLOSEC) 40 MG capsule, TAKE 1 CAPSULE BY MOUTH DAILY, Disp: 90 capsule, Rfl: 0    rosuvastatin (CRESTOR) 20 MG tablet, Take 1 tablet (20 mg total) by mouth daily, Disp: 90 tablet, Rfl: 3    Past Medical History:       Past Medical History:   Diagnosis Date    GERD (gastroesophageal reflux disease)     Pulmonary embolism (HCC)     last assessed 1/10/2013    Snoring 4/25/2016        Past Surgical History:   Procedure Laterality Date    TONSILLECTOMY AND ADENOIDECTOMY          Family History   Problem Relation Age of Onset    Diabetes Mother         DM    Hypothyroidism Mother    Gila Ribeiro Glaucoma Mother     Hypertension Mother     Alcohol abuse Sister     Drug abuse Sister     Hypothyroidism Sister     Lung cancer Maternal Grandmother         Social History     Socioeconomic History    Marital status: /Civil Union     Spouse name: Not on file    Number of children: 3    Years of education: Not on file    Highest education level: Not on file   Occupational History    Occupation:      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    Tobacco Use    Smoking status: Former Smoker     Quit date: 2007     Years since quitting: 15 5    Smokeless tobacco: Never Used   Vaping Use    Vaping Use: Some days    Substances: THC   Substance and Sexual Activity    Alcohol use: Yes     Comment: occ    Drug use: No    Sexual activity: Yes     Partners: Female   Other Topics Concern    Not on file   Social History Narrative    Recreational activities martial arts      Social Determinants of Health     Financial Resource Strain: Not on file   Food Insecurity: Not on file   Transportation Needs: Not on file   Physical Activity: Not on file   Stress: Not on file   Social Connections: Not on file   Intimate Partner Violence: Not on file   Housing Stability: Not on file        Physical Exam:     /80 (BP Location: Left arm, Patient Position: Sitting, Cuff Size: Large)   Pulse 78   Temp 97 5 °F (36 4 °C) (Tympanic)   Resp 16   Ht 5' 10" (1 778 m)   Wt 102 kg (224 lb)   SpO2 98%   BMI 32 14 kg/m² Physical Exam  Vitals and nursing note reviewed  Constitutional:       Appearance: Normal appearance  HENT:      Head: Normocephalic and atraumatic  Eyes:      Extraocular Movements: Extraocular movements intact  Pupils: Pupils are equal, round, and reactive to light  Cardiovascular:      Rate and Rhythm: Normal rate and regular rhythm  Pulses: Normal pulses  Heart sounds: Normal heart sounds  Pulmonary:      Effort: Pulmonary effort is normal       Breath sounds: Normal breath sounds  Abdominal:      General: Abdomen is flat  Palpations: Abdomen is soft  Musculoskeletal:         General: Tenderness present  Cervical back: Normal range of motion and neck supple  Skin:     Capillary Refill: Capillary refill takes less than 2 seconds  Neurological:      General: No focal deficit present  Mental Status: He is alert and oriented to person, place, and time  Psychiatric:         Mood and Affect: Mood normal          Behavior: Behavior normal          Thought Content: Thought content normal          Judgment: Judgment normal           Data:     Pre-operative work-up    Laboratory Results: none needed     EKG: none needed    No results found for this or any previous visit (from the past 672 hour(s))  Assessment & Recommendations:     Problem List Items Addressed This Visit        Endocrine    Hypothyroidism     Check tsh            Musculoskeletal and Integument    Tear of left supraspinatus tendon     Schedule for rotator cuff surgery            Other    Abnormal glucose     Had improved  Check a1c         Hyperlipidemia     Check lipids         Pre-op examination - Primary          Pre-Op Evaluation Assessment  61 y o  male with planned surgery: rotator cuff surgery  Known risk factors for perioperative complications: None          Current medications which may produce withdrawal symptoms if withheld perioperatively: none- can take levo morning or proceedure  Pre-Op Evaluation Plan  1  Further preoperative workup as follows:   - None; no further preoperative work-up is required    2  Medication Management/Recommendations:   - None, continue medication regimen including morning of surgery, with sip of water    3  Prophylaxis for cardiac events with perioperative beta-blockers: not indicated  4  Patient requires further consultation with: None    Clearance  Patient is CLEARED for surgery without any additional cardiac testing       Aubrie Frye DO  [address] [address]  Phone#  [phone]  Fax#  [phone]

## 2022-07-22 ENCOUNTER — ANESTHESIA EVENT (OUTPATIENT)
Dept: PERIOP | Facility: AMBULARY SURGERY CENTER | Age: 57
End: 2022-07-22
Payer: COMMERCIAL

## 2022-07-22 DIAGNOSIS — E03.9 HYPOTHYROIDISM, UNSPECIFIED TYPE: ICD-10-CM

## 2022-07-22 RX ORDER — LEVOTHYROXINE SODIUM 112 UG/1
112 TABLET ORAL
Qty: 90 TABLET | Refills: 3 | Status: SHIPPED | OUTPATIENT
Start: 2022-07-22

## 2022-07-26 ENCOUNTER — EVALUATION (OUTPATIENT)
Dept: PHYSICAL THERAPY | Facility: OTHER | Age: 57
End: 2022-07-26
Payer: COMMERCIAL

## 2022-07-26 DIAGNOSIS — M75.102 TEAR OF LEFT SUPRASPINATUS TENDON: ICD-10-CM

## 2022-07-26 DIAGNOSIS — Z01.818 PRE-OP EVALUATION: Primary | ICD-10-CM

## 2022-07-26 PROCEDURE — 97161 PT EVAL LOW COMPLEX 20 MIN: CPT

## 2022-07-26 PROCEDURE — 97112 NEUROMUSCULAR REEDUCATION: CPT

## 2022-07-26 NOTE — PROGRESS NOTES
PT Evaluation     Today's date: 2022  Patient name: Pretty Thorne  : 1965  MRN: [id number]  Referring provider: Eliseo Matthews  Dx:   Encounter Diagnosis     ICD-10-CM    1  Pre-op evaluation  Z01 [id number]    2  Tear of left supraspinatus tendon  M75 [id number] Ambulatory Referral to Physical Therapy                  Assessment  Assessment details: Pretty Thorne is a pleasant 61 y o  male who presents for a pre-op shoulder evaluation  Patient's greatest concerns are fear of not being able to keep active  Pt scheduled for surgery on 2022 for L RTCR  He states he has shoulder pain for years but now feels that the pain is not going away and would like to be able to use his arm without restrictions  He had previous therapy for his shoulder which helped significantly and is independent with an HEP  Currently, aggravating factors include driving and "turning it the wrong way " Pt is L handed  He was also getting L sided headaches, but saw a masseuse and a chiropractor which helped  He states he has full function of his arm and is able to workout with decreased weights  Aleve has helped with pain as well as biofreeze  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 at this time  Pt educated on surgical procedure, what to expect in regards to pain after surgery, home set up for sleeping, proper dressing and bathing techniques, and sling use  He lives at home with his wife who will be able to assist as necessary  Objectively, pt has full ROM and near full strength of his L shoulder  No reproduction of symptoms  Slight stretch in anterior chest and lateral axillary musculature  Etiologic factors include chronic use of B shoulders  No further referral is necessary at this time based upon examination results   Pt would benefit from skilled physical therapy services to address current deficits, improve quality of life, and restore PLOF with transition to home exercise program when appropriate  Positive prognostic indicators include positive attitude toward recovery, active lifestyle, and low symptom irritability  Negative prognostic indicators include anxiety and chronicity of symptoms  Primary Impairments:  1) decreased shoulder strength  2) decreased shoulder ROM s/p surgery  3) poor posture    Function Based Goals:  Patient will be independent with HEP upon discharge  Patient will be able to independently manage symptoms upon discharge  Patient will resume prior level of function and home ADLs with minimal to no symptoms upon discharge  Patient will be able to resume workouts at pre-surgery level with minimal to no symptoms upon discharge  Patient will return to martial arts with minimal to no symptoms upon discharge  Impairment Based Goals:  Patient will increase L shoulder PROM in accordance with post-op protocol in 3 weeks  Patient will increase L shoulder strength by at least 1/2 MMT grade in 10 weeks  Patient will demonstrate improved postural control as noted by PT and therapist in 3 weeks  Patient will maintain cervical ROM throughout POC          Impairments: abnormal coordination, abnormal muscle firing, abnormal muscle tone, abnormal or restricted ROM, abnormal movement, impaired physical strength, lacks appropriate home exercise program, pain with function and poor posture     Symptom irritability: lowUnderstanding of Dx/Px/POC: good   Prognosis: good    Plan  Plan details: 2x/wk tapering over the next 4 months with HEP  Patient would benefit from: skilled physical therapy  Referral necessary: No  Planned therapy interventions: abdominal trunk stabilization, activity modification, balance, body mechanics training, breathing training, coordination, flexibility, functional ROM exercises, home exercise program, therapeutic exercise, therapeutic activities, stretching, strengthening, postural training, patient education, neuromuscular re-education, motor coordination training, massage, manual therapy and joint mobilization  Frequency: 2x week  Duration in weeks: 18  Treatment plan discussed with: patient        Subjective Evaluation    History of Present Illness  Date of onset: 2016          Recurrent probem    Quality of life: good    Pain  Current pain ratin  At best pain ratin  At worst pain ratin  Location: L anterior shoulder   Quality: sharp  Relieving factors: relaxation, rest, support and medications  Aggravating factors: overhead activity  Progression: improved    Social Support  Lives with: spouse and adult children (wife, 3 kids, dog)    Employment status: working  Hand dominance: left      Diagnostic Tests  MRI studies: abnormal (supra, infra, subscap tear)  Treatments  Previous treatment: chiropractic, massage and physical therapy  Patient Goals  Patient goals for therapy: return to work, return to [employer] activities, independence with ADLs/IADLs, increased strength, increased motion, decreased pain and decreased edema  Patient goal: getting rid of pain, restoring normal function of the shoulder, normal work out routines, martial arts        Objective     General Comments:      Shoulder Comments   Palpation: TTP L bicep tendon and bicipital groove  Posture: FHP, rounded shoulders    Cervical ROM: full in all directions, tightness in R SB     Shoulder ROM: full in all directions, no pain      Functional ER: L  T3     R   T3  Functional IR: L   T4    R   T4     Shoulder MMT:  Flexion: L   4+/5     R   5/5  Abduction: L   4+/5     R   5/5     ER: L   5/5     R   5/5  IR: L   5/5     R   5/5      Elbow extension: L   5/5     R   5/5  Elbow flexion: L   5/5     R   5/5     Joint mobility:  A-P glide: L  normal     R   normal  Inferior glide: L  normal     R   normal     Shoulder PROM: all full, no pain, tightness in pec at end range L flexion               Precautions: L RTCR 2022 - protocol attached to chart, anxiety      Manuals  L shoulder ROM             L GH mob - A-P, inferior                                       Neuro Re-Ed             scap squeezes 5"x30            Elbow PROM flex/ext 30x ea            Wrist AROM - flex/ext, dev 30x ea            pendulums 2' ea            Seated thoracic extension             Supine cane                          Ther Ex             pulleys                                                                                                        Ther Activity                                       Gait Training                                       Modalities

## 2022-07-29 ENCOUNTER — HOSPITAL ENCOUNTER (OUTPATIENT)
Facility: AMBULARY SURGERY CENTER | Age: 57
Setting detail: OUTPATIENT SURGERY
Discharge: HOME | End: 2022-07-29
Attending: ORTHOPAEDIC SURGERY | Admitting: ORTHOPAEDIC SURGERY
Payer: COMMERCIAL

## 2022-07-29 ENCOUNTER — ANESTHESIA (OUTPATIENT)
Dept: PERIOP | Facility: AMBULARY SURGERY CENTER | Age: 57
End: 2022-07-29
Payer: COMMERCIAL

## 2022-07-29 VITALS
HEART RATE: 56 BPM | WEIGHT: 226 LBS | RESPIRATION RATE: 18 BRPM | OXYGEN SATURATION: 95 % | SYSTOLIC BLOOD PRESSURE: 132 MMHG | BODY MASS INDEX: 32.35 KG/M2 | TEMPERATURE: 97 F | DIASTOLIC BLOOD PRESSURE: 82 MMHG | HEIGHT: 70 IN

## 2022-07-29 DIAGNOSIS — M75.102 TEAR OF LEFT SUPRASPINATUS TENDON: Primary | ICD-10-CM

## 2022-07-29 PROCEDURE — 29828 SHO ARTHRS SRG BICP TENODSIS: CPT | Performed by: ORTHOPAEDIC SURGERY

## 2022-07-29 PROCEDURE — 29828 SHO ARTHRS SRG BICP TENODSIS: CPT | Performed by: PHYSICIAN ASSISTANT

## 2022-07-29 PROCEDURE — 29826 SHO ARTHRS SRG DECOMPRESSION: CPT | Performed by: ORTHOPAEDIC SURGERY

## 2022-07-29 PROCEDURE — C1713 ANCHOR/SCREW BN/BN,TIS/BN: HCPCS | Performed by: ORTHOPAEDIC SURGERY

## 2022-07-29 PROCEDURE — 29827 SHO ARTHRS SRG RT8TR CUF RPR: CPT | Performed by: ORTHOPAEDIC SURGERY

## 2022-07-29 PROCEDURE — 29827 SHO ARTHRS SRG RT8TR CUF RPR: CPT | Performed by: PHYSICIAN ASSISTANT

## 2022-07-29 PROCEDURE — C9290 INJ, BUPIVACAINE LIPOSOME: HCPCS | Performed by: ANESTHESIOLOGY

## 2022-07-29 PROCEDURE — 29826 SHO ARTHRS SRG DECOMPRESSION: CPT | Performed by: PHYSICIAN ASSISTANT

## 2022-07-29 DEVICE — ANCHOR SUT 4.75 X 22MM SWIVELOCK C DBL LOADED: Type: IMPLANTABLE DEVICE | Status: FUNCTIONAL

## 2022-07-29 DEVICE — ANCHOR SUT 4.75 X 19.1MM CLD EYELET SWIVELOCK: Type: IMPLANTABLE DEVICE | Status: FUNCTIONAL

## 2022-07-29 RX ORDER — MIDAZOLAM HYDROCHLORIDE 2 MG/2ML
INJECTION, SOLUTION INTRAMUSCULAR; INTRAVENOUS AS NEEDED
Status: DISCONTINUED | OUTPATIENT
Start: 2022-07-29 | End: 2022-07-29

## 2022-07-29 RX ORDER — LIDOCAINE HYDROCHLORIDE 10 MG/ML
INJECTION, SOLUTION EPIDURAL; INFILTRATION; INTRACAUDAL; PERINEURAL AS NEEDED
Status: DISCONTINUED | OUTPATIENT
Start: 2022-07-29 | End: 2022-07-29

## 2022-07-29 RX ORDER — SODIUM CHLORIDE, SODIUM LACTATE, POTASSIUM CHLORIDE, CALCIUM CHLORIDE 600; 310; 30; 20 MG/100ML; MG/100ML; MG/100ML; MG/100ML
INJECTION, SOLUTION INTRAVENOUS CONTINUOUS PRN
Status: DISCONTINUED | OUTPATIENT
Start: 2022-07-29 | End: 2022-07-29

## 2022-07-29 RX ORDER — ONDANSETRON 2 MG/ML
4 INJECTION INTRAMUSCULAR; INTRAVENOUS ONCE AS NEEDED
Status: DISCONTINUED | OUTPATIENT
Start: 2022-07-29 | End: 2022-07-29 | Stop reason: HOSPADM

## 2022-07-29 RX ORDER — FENTANYL CITRATE 50 UG/ML
INJECTION, SOLUTION INTRAMUSCULAR; INTRAVENOUS AS NEEDED
Status: DISCONTINUED | OUTPATIENT
Start: 2022-07-29 | End: 2022-07-29

## 2022-07-29 RX ORDER — DEXAMETHASONE SODIUM PHOSPHATE 10 MG/ML
INJECTION, SOLUTION INTRAMUSCULAR; INTRAVENOUS AS NEEDED
Status: DISCONTINUED | OUTPATIENT
Start: 2022-07-29 | End: 2022-07-29

## 2022-07-29 RX ORDER — ACETAMINOPHEN 325 MG/1
650 TABLET ORAL EVERY 6 HOURS PRN
Status: DISCONTINUED | OUTPATIENT
Start: 2022-07-29 | End: 2022-07-29 | Stop reason: HOSPADM

## 2022-07-29 RX ORDER — ONDANSETRON 2 MG/ML
INJECTION INTRAMUSCULAR; INTRAVENOUS AS NEEDED
Status: DISCONTINUED | OUTPATIENT
Start: 2022-07-29 | End: 2022-07-29

## 2022-07-29 RX ORDER — HYDROMORPHONE HCL/PF 1 MG/ML
0.2 SYRINGE (ML) INJECTION
Status: DISCONTINUED | OUTPATIENT
Start: 2022-07-29 | End: 2022-07-29 | Stop reason: HOSPADM

## 2022-07-29 RX ORDER — PROMETHAZINE HYDROCHLORIDE 25 MG/ML
25 INJECTION, SOLUTION INTRAMUSCULAR; INTRAVENOUS ONCE AS NEEDED
Status: DISCONTINUED | OUTPATIENT
Start: 2022-07-29 | End: 2022-07-29 | Stop reason: HOSPADM

## 2022-07-29 RX ORDER — BUPIVACAINE HYDROCHLORIDE 5 MG/ML
INJECTION, SOLUTION PERINEURAL
Status: COMPLETED | OUTPATIENT
Start: 2022-07-29 | End: 2022-07-29

## 2022-07-29 RX ORDER — PROPOFOL 10 MG/ML
INJECTION, EMULSION INTRAVENOUS AS NEEDED
Status: DISCONTINUED | OUTPATIENT
Start: 2022-07-29 | End: 2022-07-29

## 2022-07-29 RX ORDER — ONDANSETRON 2 MG/ML
4 INJECTION INTRAMUSCULAR; INTRAVENOUS EVERY 6 HOURS PRN
Status: DISCONTINUED | OUTPATIENT
Start: 2022-07-29 | End: 2022-07-29 | Stop reason: HOSPADM

## 2022-07-29 RX ORDER — CEFAZOLIN SODIUM 2 G/50ML
2000 SOLUTION INTRAVENOUS ONCE
Status: COMPLETED | OUTPATIENT
Start: 2022-07-29 | End: 2022-07-29

## 2022-07-29 RX ORDER — OXYCODONE HYDROCHLORIDE 5 MG/1
TABLET ORAL
Qty: 13 TABLET | Refills: 0 | Status: SHIPPED | OUTPATIENT
Start: 2022-07-29 | End: 2022-10-03 | Stop reason: ALTCHOICE

## 2022-07-29 RX ORDER — FENTANYL CITRATE/PF 50 MCG/ML
25 SYRINGE (ML) INJECTION
Status: DISCONTINUED | OUTPATIENT
Start: 2022-07-29 | End: 2022-07-29 | Stop reason: HOSPADM

## 2022-07-29 RX ORDER — CHLORHEXIDINE GLUCONATE 0.12 MG/ML
15 RINSE ORAL ONCE
Status: DISCONTINUED | OUTPATIENT
Start: 2022-07-29 | End: 2022-07-29 | Stop reason: HOSPADM

## 2022-07-29 RX ADMIN — LIDOCAINE HYDROCHLORIDE 50 MG: 10 INJECTION, SOLUTION EPIDURAL; INFILTRATION; INTRACAUDAL at 09:55

## 2022-07-29 RX ADMIN — ONDANSETRON 4 MG: 2 INJECTION INTRAMUSCULAR; INTRAVENOUS at 10:08

## 2022-07-29 RX ADMIN — SODIUM CHLORIDE, SODIUM LACTATE, POTASSIUM CHLORIDE, AND CALCIUM CHLORIDE: .6; .31; .03; .02 INJECTION, SOLUTION INTRAVENOUS at 09:05

## 2022-07-29 RX ADMIN — CEFAZOLIN SODIUM 2000 MG: 2 SOLUTION INTRAVENOUS at 10:01

## 2022-07-29 RX ADMIN — PROPOFOL 200 MG: 10 INJECTION, EMULSION INTRAVENOUS at 09:55

## 2022-07-29 RX ADMIN — BUPIVACAINE 20 ML: 13.3 INJECTION, SUSPENSION, LIPOSOMAL INFILTRATION at 09:12

## 2022-07-29 RX ADMIN — BUPIVACAINE HYDROCHLORIDE 5 ML: 5 INJECTION, SOLUTION PERINEURAL at 09:12

## 2022-07-29 RX ADMIN — FENTANYL CITRATE 50 MCG: 50 INJECTION, SOLUTION INTRAMUSCULAR; INTRAVENOUS at 09:12

## 2022-07-29 RX ADMIN — DEXAMETHASONE SODIUM PHOSPHATE 10 MG: 10 INJECTION, SOLUTION INTRAMUSCULAR; INTRAVENOUS at 10:08

## 2022-07-29 RX ADMIN — MIDAZOLAM HYDROCHLORIDE 2 MG: 1 INJECTION, SOLUTION INTRAMUSCULAR; INTRAVENOUS at 09:10

## 2022-07-29 RX ADMIN — FENTANYL CITRATE 50 MCG: 50 INJECTION, SOLUTION INTRAMUSCULAR; INTRAVENOUS at 09:10

## 2022-07-29 NOTE — DISCHARGE INSTRUCTIONS
You are being scheduled for a shoulder arthroscopy to treat your symptoms  Below are some instructions and information on what to expect before and after your surgery  Pre-Surgical Preparation for Arthroscopic Shoulder Surgery: You will be contacted the evening prior to your surgery to confirm the scheduled time of the procedure and when to arrive at the hospital    Do not eat or drink anything after midnight the night before your surgery  Since you are having out-patient surgery, make sure that you have someone who can drive you home later in the day  Also, prepare that person for a long day, as the process of safely preparing for and recovering from the procedure is more time consuming than the actual procedure! As you will be in a sling after surgery, please wear or bring a loose fitting button-down shirt so that you can easily place this over the sling when you leave the surgical suite  This avoids having to place the operative arm in a sleeve  Most patients find that this is the easiest outfit to wear for the first week or so after surgery so you may want to plan accordingly  Most patients find that lying down in bed after shoulder surgery accentuates their discomfort  This is likely related to the effect of gravity on the swelling in the shoulder  As a result, most patients sleep better in a recliner or in bed with pillows propped up behind their back for the first few days or weeks after surgery  It is a good idea to plan for this ahead of time so there will be less hassle getting things set up the night after surgery  What to Expect After Arthroscopic Shoulder Surgery: It is normal to have swelling and discomfort in the shoulder for several days or a week after surgery  It is also normal to have a small amount of drainage from the surgical wounds (especially the first few days after surgery), as we put fluid into the shoulder to visualize the structures during surgery  It is NOT normal to have foul smelling, purulent drainage and if this is noted, please contact the office immediately or proceed to the emergency room for evaluation as this may indicate an infection  Applying ice bags to the shoulder may help with pain that is not controlled by the regional block  Ice should be applied 20-30 minutes at a time, every hour or two  Make sure to put a thin towel or T-shirt next to your skin to avoid direct contact of the ice with the skin  Icing is most helpful in the first 48 hours, although many people find that continuing past this time frame lessens their postoperative pain  Please note that your post-operative dressing is not conductive to ice, so if you need to, it is okay to remove that dressing even the night after surgery and place band-aids over the wounds in order for the ice to take effect  Pain Control    Most patients will receive a nerve block, the local anesthetic may keep your whole arm numb for up to 4 days  You will be given a prescription for narcotic pain medication when you are discharged from the hospital   With the newer nerve block that is being utilized, patients are rarely requiring the use of this narcotic pain medication  If you find you do not tolerate that type of pain medicine well, call our office and we will try another one  In addition to the narcotic pain medication, it is safe to use an anti-inflammatory (unless the patient has a medical condition that would not allow safe use of this mediation)  This includes the Advil, Motrin, Ibuprofen and Alleve category of medications  Simply follow the over the counter dosing on the package and use as indicated as another adjunct  Importantly since these medications are all very similar, use only one of them  Tylenol is a separate medication that can be utilized as well and can be taken at the same time as the other medication or given in a "staggered" manner    Just make sure that you follow the dosing on the over the counter bottle instructions  Also make sure that the pain medication prescribed by Dr Opal Garcia team does not contain acetaminophen (this is found in Percocet and Vicodin)  Typically we do not prescribe those types of pain medications but if for some reason that has been prescribed DO NOT add more Tylenol (acetaminophen) as you could end up taking too much of that medication  As mentioned above, most patients find that lying down accentuates their discomfort  You might sleep better in a recliner, or propped up in bed  Dr Juan Carlos Amaya encourages patients to safely ambulate around the house as much as possible in the first few days after the procedure as this can help with blood circulation in the legs  While the incidence of blood clots is very rare following shoulder surgery, early ambulation is a great way to help decrease the already low rate  24 hours after the surgery you may remove the bandage and cover incisions with Band-Aids if needed  At that time you may shower, the wounds will have a surgical glue that will protect them from shower water but do not submerge your incisions directly (bathing or swimming) until at least 2 weeks post-operatively  It is safe to let the arm hang at the side and take a shower and put on a shirt without the sling on  Just make sure that you do not use the operative are to reach out and grab anything as that may damage the repair  When you are done showering and getting dressed please return the operative arm to the sling  Unless noted otherwise in your discharge paperwork, Dr Juan Carlos Amaya uses absorbable sutures so they do not need to be removed  Dr Jailene Rodriguez physician assistant (PA) will see you in the office a few days after the procedure to review the intra-operative findings and to initiate physical therapy if appropriate    A post-operative appointment should have been scheduled for you already, but if for some reason this did not happen, please call the office to make one  Physical therapy is important after nearly all shoulder surgeries and a detailed rehabilitation plan based on the specific intra-operative findings and procedures will be provided to your therapist at the first post-operative office visit  Most patients have post-operative therapy appointments scheduled pre-operatively, but if you do not, that will be handled at the first post-operative office visit  Unless expressly directed otherwise it is safe to remove the sling even the first day after the surgery and let the arm hang by the side  This allows patients to shower and even put a shirt on (bad arm in the sleeve first)  It is also safe to flex and extend their wrist, hand and fingers as much as possible when the block wears off  These simple motions can serve to pump fluid out of the forearm and decrease swelling in the arm

## 2022-07-29 NOTE — INTERVAL H&P NOTE
H&P reviewed  After examining the patient I find no changes in the patients condition since the H&P had been written      Vitals:    07/29/22 0849   BP: 143/89   Pulse: 59   Resp: 18   Temp: (!) 97 °F (36 1 °C)   SpO2: 97%

## 2022-07-29 NOTE — ANESTHESIA PROCEDURE NOTES
Peripheral Block    Patient location during procedure: holding area  Start time: 7/29/2022 9:12 AM  Reason for block: at surgeon's request and post-op pain management  Staffing  Performed: Anesthesiologist   Anesthesiologist: Marion Chapa MD  Preanesthetic Checklist  Completed: patient identified, IV checked, site marked, risks and benefits discussed, surgical consent, monitors and equipment checked, pre-op evaluation and timeout performed  Peripheral Block  Patient position: sitting  Prep: ChloraPrep  Patient monitoring: continuous pulse ox and frequent blood pressure checks  Block type: interscalene  Laterality: left  Injection technique: single-shot  Procedures: ultrasound guided, Ultrasound guidance required for the procedure to increase accuracy and safety of medication placement and decrease risk of complications  Ultrasound permanent image savedbupivacaine (MARCAINE) 0 5 % - Perineural   5 mL - 7/29/2022 9:12:00 AM  Needle  Needle type: Stimuplex   Needle gauge: 20g  Needle length: 4in    Needle localization: ultrasound guidance  Test dose: negative  Assessment  Injection assessment: incremental injection, local visualized surrounding nerve on ultrasound, no paresthesia on injection and negative aspiration for heme  Paresthesia pain: none  Heart rate change: no  Slow fractionated injection: yes  Post-procedure:  site cleaned  patient tolerated the procedure well with no immediate complications  Additional Notes  With Exparel 20 mL

## 2022-07-29 NOTE — ANESTHESIA PREPROCEDURE EVALUATION
Procedure:  SHOULDER ARTHROSCOPIC ROTATOR CUFF REPAIR (Left Shoulder)    Relevant Problems   CARDIO   (+) Hyperlipidemia      ENDO   (+) Hypothyroidism      GI/HEPATIC   (+) GERD (gastroesophageal reflux disease)      MUSCULOSKELETAL   (+) Impingement syndrome of left shoulder      NEURO/PSYCH   (+) Anxiety disorder      Musculoskeletal and Integument   (+) Tear of left supraspinatus tendon        Physical Exam    Airway    Mallampati score: II  TM Distance: >3 FB  Neck ROM: full     Dental       Cardiovascular      Pulmonary      Other Findings        Anesthesia Plan  ASA Score- 2     Anesthesia Type- general with ASA Monitors  Additional Monitors:   Airway Plan: LMA  Comment: Discussed long acting interscalene nerve block for postoperative pain control with risks/benefits/alternatives  Patient made aware of possible postoperative shortness of breath related to transient hemidiaphragmatic paralysis  Discussed extremely low likelihood of transient or permanent nerve injury in the setting of ultrasound guidance and patient participation  Patient aware and would like to proceed          Plan Factors-Exercise tolerance (METS): >4 METS  Chart reviewed  Existing labs reviewed  Patient summary reviewed  Induction- intravenous  Postoperative Plan- Plan for postoperative opioid use  Planned trial extubation    Informed Consent- Anesthetic plan and risks discussed with patient  I personally reviewed this patient with the CRNA  Discussed and agreed on the Anesthesia Plan with the CRNA  Pako Carter

## 2022-07-29 NOTE — ANESTHESIA POSTPROCEDURE EVALUATION
Post-Op Assessment Note    CV Status:  Stable  Pain Score: 0    Pain management: adequate     Mental Status:  Arousable and sleepy   Hydration Status:  Stable and euvolemic   PONV Controlled:  Controlled   Airway Patency:  Patent      Post Op Vitals Reviewed: Yes      Staff: CRNA         No complications documented      BP   128/78   Temp  97 8   Pulse  49   Resp   15   SpO2   100

## 2022-07-29 NOTE — OP NOTE
OPERATIVE REPORT  PATIENT NAME: Renny Perez    :  1965  MRN: [id number]  Pt Location: [facility] OR [facility]OM 06    SURGERY DATE: 2022     SURGEON: Armida Elizondo MD     ASSISTANT: Maya Szymanski PA-C     NOTE: Maya Szymanski PA-C was present throughout the entire procedure and performed essential assistance with patient prepping, draping, positioning, suture management, wound closure, sterile dressing application and sling application, all under my direct supervision  NOTE: No qualified resident physician was available for assistance    PREOPERATIVE DIAGNOSIS:  Left Shoulder Supraspinatus Tear    POSTOPERATIVE DIAGNOSIS: Left Shoulder Supraspinatus Tear and Long Head Biceps Tendon Partial Tear    PROCEDURES: Surgical Arthroscopy Left Shoulder with Rotator Cuff Repair and Long Head Biceps Tenodesis    ANESTHESIA STAFF: Lali Palomo MD     ANESTHESIA TYPE: General LMA with ultrasound guided interscalene block (Exparel)  The interscalene block was provided by the anesthesia staff per my request for postoperative pain control and to decrease the use of postoperative narcotic medication for pain control  COMPLICATIONS: None    FINDINGS: Supraspinatus Tear and Long Head Biceps Tendon Partial Tear    SPECIMEN(S):  None    ESTIMATED BLOOD LOSS: 1 mL    INDICATION:  Briefly, the patient is a 61 y o   male with left shoulder pain  MRI scan confirmed a full thickness supraspinatus tear  The patient elected for arthroscopic treatment  Informed consent was obtained after a thorough discussion of the risks and benefits of the procedure, as well as alternatives to the procedure  OPERATIVE TECHNIQUE:  On the day of surgery, I identified the patients left shoulder and marked it with my initials  The patient was taken to the operating room where anesthesia was induced and 2 grams of IV Cefazolin were given   The patient was examined in the supine position and was found to have full range of motion of the left shoulder with no instability   The patient was then positioned in the [address] [place] [address] chair position  All bony prominences were padded  The shoulder was prepped and draped in normal sterile fashion  After a time-out for safety, a standard posterolateral arthroscopic portal was made  Glenohumeral evaluation revealed intact glenohumeral articular cartilage with no loose bodies  There was a full thickness, complete supraspinatus tear as well as an associated partial long head biceps tendon tear (not seen on imaging but very common with this tear pattern)  The subscapularis and infraspinatus were intact and the circumferential glenoid labrum had some mild degenerative change but no elvira tear or displaced labral flap requiring debridement  After the intra-articular evaluation was completed, the scope was then placed in the subacromial space through the same portal where a thorough bursectomy was performed  The full thickness supraspinatus tear was found to measure 1 6 cm from anterior to posterior and was able to be easily reduced to the tuberosity  The tuberosity was prepared in routine fashion and a double row suture bridge configuration repair with one medial 4 75 mm double loaded Ar[employer]rex Bio[employer] SwiveLock anchor and one lateral 4 75 mm Arthrex Bio[employer] SwiveLock anchor using four stiches through the tendon in horizontal mattress fashion was performed achieving anatomic reduction of the rotator cuff tendon to the tuberosity  The long head of biceps tendon was indicated for tenodesis and it was incorporated into the rotator cuff repair by using the anterior limb of the most anterior anchor through and around the long head of biceps in a loop whipstitch fashion; the long head of biceps was then released  When the medial row of the rotator cuff repair was tied down, this incorporated the long head biceps tenodesis into our repair   The CA ligament was frayed so it was debrided to a stable border using a shaving device  The area was then irrigated  Scope was withdrawn  Wounds were closed with 4-0 Monocryl and Histoacryl  Sterile dressings and a sling with an abduction pillow was placed  The patient was awoken without complication and returned to the recovery room in good condition  We will see the patient back in the office next week to initiate therapy following standard rotator cuff repair rehabilitation protocol  At the end of procedure, the counts were correct       PATIENT DISPOSITION:  Stable to PACU      SIGNATURE: Rojas Malcolm MD  DATE: July 29, 2022  TIME: 10:53 AM

## 2022-08-01 ENCOUNTER — OFFICE VISIT (OUTPATIENT)
Dept: OBGYN CLINIC | Facility: OTHER | Age: 57
End: 2022-08-01

## 2022-08-01 VITALS
HEIGHT: 70 IN | WEIGHT: 226 LBS | DIASTOLIC BLOOD PRESSURE: 83 MMHG | HEART RATE: 47 BPM | SYSTOLIC BLOOD PRESSURE: 136 MMHG | BODY MASS INDEX: 32.35 KG/M2

## 2022-08-01 DIAGNOSIS — Z47.89 AFTERCARE FOLLOWING SURGERY OF THE MUSCULOSKELETAL SYSTEM: Primary | ICD-10-CM

## 2022-08-01 PROCEDURE — 99024 POSTOP FOLLOW-UP VISIT: CPT | Performed by: PHYSICIAN ASSISTANT

## 2022-08-01 NOTE — LETTER
August 1, 2022     Patient: Nicole Morgan  YOB: 1965  Date of Visit: 8/1/2022      To Whom it May Concern:    Antolin Marquez is under my professional care  [facility] was seen in my office on 8/1/2022  [facility] may return to work on 08/08/2022 and may return to work with limitations Of right hand work only  No lifting, pulling, or pushing with left arm  Patient is to wear sling for 6 weeks  if these accommodations cannot be met than he is to remain out of work  If you have any questions or concerns, please don't hesitate to call  Sincerely,          Navjot Fang PA-C        CC: Carlos Chavez

## 2022-08-01 NOTE — PROGRESS NOTES
Assessment:       1  Aftercare following surgery of the musculoskeletal system          Plan:        Patient is doing well postoperatively  Operative note, images, and standard rotator cuff repair rehab protocol were discussed  All questions were addressed to the patient's satisfaction  Patient has an appointment with PT  A work note has been placed in his chart  Follow-up will be in 2 months to assess patient's progress  Subjective:     Patient ID: Sesar Pierce is a 61 y o  male  Chief Complaint:    HPI    Patient presents to the office for follow-up status post left shoulder arthroscopy with rotator cuff repair biceps tenodesis on 07/29/2022  Patient reports mild residual paresthesia following anesthetic block  Pain is controlled  Social History     Occupational History    Occupation:      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    Tobacco Use    Smoking status: Former Smoker     Quit date: 2007     Years since quitting: 15 5    Smokeless tobacco: Never Used   Vaping Use    Vaping Use: Some days    Substances: THC   Substance and Sexual Activity    Alcohol use: Yes     Comment: occ    Drug use: No    Sexual activity: Yes     Partners: Female      Review of Systems   Constitutional: Negative  Respiratory: Negative  Musculoskeletal: Positive for myalgias  Negative for arthralgias  Skin: Positive for wound  Neurological: Positive for weakness  Negative for numbness  Psychiatric/Behavioral: Negative  Objective:     Ortho ExamPhysical Exam  HENT:      Head: Atraumatic  Cardiovascular:      Pulses: Normal pulses  Pulmonary:      Effort: Pulmonary effort is normal    Musculoskeletal:      Comments: Left shoulder range of motion not tested  Skin:     General: Skin is warm and dry  Capillary Refill: Capillary refill takes less than 2 seconds  Comments: Surgical incisions dry and clean     Neurological:      Mental Status: He is alert and oriented to person, place, and time  Sensory: No sensory deficit     Psychiatric:         Mood and Affect: Mood normal          Behavior: Behavior normal

## 2022-08-02 ENCOUNTER — OFFICE VISIT (OUTPATIENT)
Dept: PHYSICAL THERAPY | Facility: OTHER | Age: 57
End: 2022-08-02
Payer: COMMERCIAL

## 2022-08-02 DIAGNOSIS — M12.812 ROTATOR CUFF ARTHROPATHY OF LEFT SHOULDER: ICD-10-CM

## 2022-08-02 DIAGNOSIS — M75.102 TEAR OF LEFT SUPRASPINATUS TENDON: Primary | ICD-10-CM

## 2022-08-02 PROCEDURE — 97112 NEUROMUSCULAR REEDUCATION: CPT | Performed by: PHYSICAL THERAPIST

## 2022-08-02 PROCEDURE — 97140 MANUAL THERAPY 1/> REGIONS: CPT | Performed by: PHYSICAL THERAPIST

## 2022-08-02 PROCEDURE — 97164 PT RE-EVAL EST PLAN CARE: CPT | Performed by: PHYSICAL THERAPIST

## 2022-08-02 NOTE — PROGRESS NOTES
PT Re-Evaluation      Today's date: 2022  Patient name: Gracie Montalvo  : 1965  MRN: [id number]  Referring provider: Deepthi Peck [address] [place] [address] Drive is a pleasant 61 y o  male who presents for first treatment session post-op RTC repair w/ biceps involvement  Patient reports feeling okay since surgery and after follow-up with physician  Symptoms are reported to be 6-7/10, and he is feeling tight in the shoulder and upper neck  Patient reports sleeping in brace with pillows alongside to prevent rolling onto surgical limb  Patient is not taking any medication regularly, but takes Aleve as needed and is icing 2-3x/day  Referring physician discussed return to work with patient  Patient is L hand dominant, but able to complete most ADLs w/ R hand or assistance  Patient reports no headaches in surgery last week  Patient is very aware of limitations and protocol capabilities  Patient was educated on post-op protocol and HEP, including c/s, elbow, and wrist mobility, as well as gentle shoulder movement within protocol  Patient was able to tolerated all exercises of HEP and demonstrate proper form and understanding of exercises  Patient will continue to benefit from skilled therapy in order to address deficits and return to PLOF  Primary Impairments:  1) decreased shoulder strength  2) decreased shoulder ROM s/p surgery  3) poor posture    Function Based Goals:  Patient will be independent with HEP upon discharge  Patient will be able to independently manage symptoms upon discharge  Patient will resume prior level of function and home ADLs with minimal to no symptoms upon discharge  Patient will be able to resume workouts at pre-surgery level with minimal to no symptoms upon discharge  Patient will return to martial arts with minimal to no symptoms upon discharge       Impairment Based Goals:  Patient will increase L shoulder PROM in accordance with post-op protocol in 3 weeks  Patient will increase L shoulder strength by at least 1/2 MMT grade in 10 weeks  Patient will demonstrate improved postural control as noted by PT and therapist in 3 weeks  Patient will maintain cervical ROM throughout POC  Impairments: abnormal coordination, abnormal muscle firing, abnormal muscle tone, abnormal or restricted ROM, abnormal movement, impaired physical strength, lacks appropriate home exercise program, pain with function and poor posture     Plan: Continue per plan of care        Subjective Evaluation    History of Present Illness  Date of onset: 2016          Recurrent probem      Pain  Current pain ratin  At best pain ratin  At worst pain ratin  Location: L anterior shoulder   Quality: sharp  Relieving factors: relaxation, rest, support and medications  Aggravating factors: overhead activity  Progression: improved        Patient Goals  Patient goals for therapy: return to work, return to [employer] activities, independence with ADLs/IADLs, increased strength, increased motion, decreased pain and decreased edema  Patient goal: getting rid of pain, restoring normal function of the shoulder, normal work out routines, martial arts      Objective     General Comments:      Shoulder Comments   *Post-operative measurements - 2022  PROM  Flexion: 90  Abd: 35    AROM c/s WNL    *Measure preoperatively  Palpation: TTP L bicep tendon and bicipital groove  Posture: FHP, rounded shoulders    Cervical ROM: full in all directions, tightness in R SB     Shoulder ROM: full in all directions, no pain      Functional ER: L  T3     R   T3  Functional IR: L   T4    R   T4     Shoulder MMT:  Flexion: L   4+/5     R   5/5  Abduction: L   4+/5     R   5/5     ER: L   5/5     R   5/5  IR: L   5/5     R   5/5      Elbow extension: L   5/5     R   5/5  Elbow flexion: L   5/5     R   5/5     Joint mobility:  A-P glide: L  normal     R   normal  Inferior glide: L  normal     R   normal Shoulder PROM: all full, no pain, tightness in pec at end range L flexion       Precautions: L RTCR 7/29/2022 - protocol attached to chart, anxiety    Care delivered by Hazeline Klinefelter, SPT, directly supervised by Jamila Slaughter DPT        Manuals 7/26 8/2           L shoulder PROM  Flex & abd           L GH mob - A-P, inferior                                       Neuro Re-Ed             scap squeezes 5"x30 3x10           Elbow PROM flex/ext 30x ea 3x10           Wrist AROM - flex/ext, dev 30x ea 3x10           C/s AROM - flex/ext, SB, rot  3x10           pendulums 2' ea 1' ea           Seated thoracic extension             Supine cane             Hand   3x10           Ther Ex             pulleys                                                                                                        Ther Activity                                       Gait Training                                       Modalities                                       Stop Time: 1505  Total time in clinic (min): 60 minutes

## 2022-08-08 ENCOUNTER — OFFICE VISIT (OUTPATIENT)
Dept: PHYSICAL THERAPY | Facility: OTHER | Age: 57
End: 2022-08-08
Payer: COMMERCIAL

## 2022-08-08 DIAGNOSIS — M12.812 ROTATOR CUFF ARTHROPATHY OF LEFT SHOULDER: ICD-10-CM

## 2022-08-08 DIAGNOSIS — M75.102 TEAR OF LEFT SUPRASPINATUS TENDON: Primary | ICD-10-CM

## 2022-08-08 PROCEDURE — 97140 MANUAL THERAPY 1/> REGIONS: CPT | Performed by: PHYSICAL THERAPIST

## 2022-08-08 PROCEDURE — 97112 NEUROMUSCULAR REEDUCATION: CPT | Performed by: PHYSICAL THERAPIST

## 2022-08-08 NOTE — PROGRESS NOTES
Daily Note     Today's date: 2022  Patient name: Johanna Rueda  : 1965  MRN: [id number]  Referring provider: Lencho Esparza  Dx: No diagnosis found  Subjective: Patient reports no significant symptoms upon arrival and reports that HEP is going very well  Objective: See treatment diary below      Assessment: Tolerated treatment well w/ no increase in symptoms  Some IR to body was introduced, as was seated thoracic extensions for continued mobility and light muscle activation  PROM was continued per protocol and tolerated well  Patient will continue to benefit from skilled therapy in order to address deficits and return to PLOF  Plan: Continue per plan of care  Precautions: L RTCR 2022 - protocol attached to chart, anxiety    Care delivered by [facility], directly supervised by Kasie Oscar DPT      Manuals            L shoulder ROM  LW           L GH mob - A-P, inferior                                       Neuro Re-Ed             scap squeezes 5"x30 3x10 5"           Elbow PROM flex/ext 30x ea 30x ea           Wrist AROM - flex/ext, dev 30x ea 30x ea           pendulums 2' ea 1' ea           Seated thoracic extension  x15 5"           Supine cane             AAROM IR  2x10           Ther Ex             pulleys                                                                                                        Ther Activity                                       Gait Training                                       Modalities

## 2022-08-10 ENCOUNTER — APPOINTMENT (OUTPATIENT)
Dept: LAB | Age: 57
End: 2022-08-10

## 2022-08-10 ENCOUNTER — APPOINTMENT (OUTPATIENT)
Dept: LAB | Age: 57
End: 2022-08-10
Payer: COMMERCIAL

## 2022-08-10 DIAGNOSIS — Z11.4 SCREENING FOR HIV (HUMAN IMMUNODEFICIENCY VIRUS): ICD-10-CM

## 2022-08-10 DIAGNOSIS — R73.09 ABNORMAL GLUCOSE: ICD-10-CM

## 2022-08-10 DIAGNOSIS — Z00.8 HEALTH EXAMINATION IN POPULATION SURVEY: ICD-10-CM

## 2022-08-10 DIAGNOSIS — E78.2 MIXED HYPERLIPIDEMIA: ICD-10-CM

## 2022-08-10 LAB
ALBUMIN SERPL BCP-MCNC: 3.3 G/DL (ref 3.5–5)
ALP SERPL-CCNC: 58 U/L (ref 46–116)
ALT SERPL W P-5'-P-CCNC: 77 U/L (ref 12–78)
ANION GAP SERPL CALCULATED.3IONS-SCNC: 4 MMOL/L (ref 4–13)
AST SERPL W P-5'-P-CCNC: 41 U/L (ref 5–45)
BILIRUB SERPL-MCNC: 0.62 MG/DL (ref 0.2–1)
BUN SERPL-MCNC: 21 MG/DL (ref 5–25)
CALCIUM ALBUM COR SERPL-MCNC: 9.6 MG/DL (ref 8.3–10.1)
CALCIUM SERPL-MCNC: 9 MG/DL (ref 8.3–10.1)
CHLORIDE SERPL-SCNC: 107 MMOL/L (ref 96–108)
CHOLEST SERPL-MCNC: 155 MG/DL
CO2 SERPL-SCNC: 28 MMOL/L (ref 21–32)
CREAT SERPL-MCNC: 1.08 MG/DL (ref 0.6–1.3)
EST. AVERAGE GLUCOSE BLD GHB EST-MCNC: 108 MG/DL
GFR SERPL CREATININE-BSD FRML MDRD: 75 ML/MIN/1.73SQ M
GLUCOSE P FAST SERPL-MCNC: 93 MG/DL (ref 65–99)
HBA1C MFR BLD: 5.4 %
HDLC SERPL-MCNC: 24 MG/DL
LDLC SERPL CALC-MCNC: 99 MG/DL (ref 0–100)
POTASSIUM SERPL-SCNC: 4.5 MMOL/L (ref 3.5–5.3)
PROT SERPL-MCNC: 7.3 G/DL (ref 6.4–8.4)
SODIUM SERPL-SCNC: 139 MMOL/L (ref 135–147)
TRIGL SERPL-MCNC: 162 MG/DL
TSH SERPL DL<=0.05 MIU/L-ACNC: 1.17 UIU/ML (ref 0.45–4.5)

## 2022-08-10 PROCEDURE — 36415 COLL VENOUS BLD VENIPUNCTURE: CPT

## 2022-08-10 PROCEDURE — 84443 ASSAY THYROID STIM HORMONE: CPT

## 2022-08-10 PROCEDURE — 83036 HEMOGLOBIN GLYCOSYLATED A1C: CPT

## 2022-08-10 PROCEDURE — 87389 HIV-1 AG W/HIV-1&-2 AB AG IA: CPT

## 2022-08-10 PROCEDURE — 80053 COMPREHEN METABOLIC PANEL: CPT

## 2022-08-10 PROCEDURE — 80061 LIPID PANEL: CPT

## 2022-08-11 ENCOUNTER — OFFICE VISIT (OUTPATIENT)
Dept: PHYSICAL THERAPY | Facility: OTHER | Age: 57
End: 2022-08-11
Payer: COMMERCIAL

## 2022-08-11 DIAGNOSIS — Z01.818 PRE-OP EVALUATION: ICD-10-CM

## 2022-08-11 DIAGNOSIS — M75.102 TEAR OF LEFT SUPRASPINATUS TENDON: Primary | ICD-10-CM

## 2022-08-11 DIAGNOSIS — M12.812 ROTATOR CUFF ARTHROPATHY OF LEFT SHOULDER: ICD-10-CM

## 2022-08-11 LAB — HIV 1+2 AB+HIV1 P24 AG SERPL QL IA: NORMAL

## 2022-08-11 PROCEDURE — 97112 NEUROMUSCULAR REEDUCATION: CPT

## 2022-08-11 PROCEDURE — 97140 MANUAL THERAPY 1/> REGIONS: CPT

## 2022-08-11 NOTE — PROGRESS NOTES
Daily Note     Today's date: 2022  Patient name: Cj Hutchins  : 1965  MRN: [id number]  Referring provider: Jamal Campblel  Dx:   Encounter Diagnosis     ICD-10-CM    1  Tear of left supraspinatus tendon  M75 102    2  Rotator cuff arthropathy of left shoulder  M12 812    3  Pre-op evaluation  Z01 818                   Subjective: Patient reports his shoulder has not been bothering him but he has had increased left UT discomfort  Objective: See treatment diary below      Assessment:  Patient tolerated all exercises well  Pt required minimal verbal cues throughout the session for form and to decrease compensatory techniques, but was able correct after cuing  Continue to progress as tolerated  All exercises and PROM performed within protocol limits  Plan: Continue per plan of care  Precautions: L RTCR 2022 - protocol attached to chart, anxiety    Care delivered by APRIL Moreno, directly supervised by Jj Huddleston DPT      Manuals           L shoulder ROM  LW JG PROM          L GH mob - A-P, inferior                                       Neuro Re-Ed             scap squeezes 5"x30 3x10 5" 5" 3x10          Elbow PROM flex/ext 30x ea 30x ea 30x ea          Wrist AROM - flex/ext, dev 30x ea 30x ea 30x ea          pendulums 2' ea 1' ea 1' ea          Seated thoracic extension  x15 5" 5" x 15          Supine cane             AAROM IR  2x10           Ther Ex             pulleys                                                                                                        Ther Activity                                       Gait Training                                       Modalities

## 2022-08-15 ENCOUNTER — OFFICE VISIT (OUTPATIENT)
Dept: PHYSICAL THERAPY | Facility: OTHER | Age: 57
End: 2022-08-15
Payer: COMMERCIAL

## 2022-08-15 DIAGNOSIS — M12.812 ROTATOR CUFF ARTHROPATHY OF LEFT SHOULDER: ICD-10-CM

## 2022-08-15 DIAGNOSIS — M75.102 TEAR OF LEFT SUPRASPINATUS TENDON: Primary | ICD-10-CM

## 2022-08-15 PROCEDURE — 97112 NEUROMUSCULAR REEDUCATION: CPT | Performed by: PHYSICAL THERAPIST

## 2022-08-15 PROCEDURE — 97140 MANUAL THERAPY 1/> REGIONS: CPT | Performed by: PHYSICAL THERAPIST

## 2022-08-15 NOTE — PROGRESS NOTES
Daily Note     Today's date: 8/15/2022  Patient name: Esequiel Morejon  : 1965  MRN: [id number]  Referring provider: Marlene Watson*  Dx: No diagnosis found  Subjective: Patient reports some increased stiffness and symptoms in neck due to sling and sleeping position, but overall he reports that shoulder is feeling okay  HEP is going well  Objective: See treatment diary below      Assessment: Tolerated treatment well w/ no symptom provocation  Hold times for scap squeezes was increased to emphasis muscle endurance and proper posture  An upper trap and a levator stretch were added in order to combat neck stiffness  Patient continues to tolerated PROM well, focusing on smooth motions and minimal to no symptoms  Patient will continue to benefit from skilled therapy in order to address deficits and return to PLOF  Plan: Continue per plan of care  Precautions: L RTCR 2022 - protocol attached to chart, anxiety    Care delivered by APRIL Holt, directly supervised by Orquidea Slade DPT      Manuals 7/26 8/8 8/11 8/15         L shoulder ROM  LW JG PROM LW         L GH mob - A-P, inferior                                       Neuro Re-Ed             scap squeezes 5"x30 3x10 5" 5" 3x10 10" 2x10         Elbow PROM flex/ext 30x ea 30x ea 30x ea 30x ea         Wrist AROM - flex/ext, dev 30x ea 30x ea 30x ea 30x ea         pendulums 2' ea 1' ea 1' ea 1' ea         Seated thoracic extension  x15 5" 5" x 15 2x10 5"         Supine cane                 2x10         AAROM IR  2x10  2x10         Ther Ex             pulleys             Levator stretch    10x10"         C/s stretching    10x10" ea                                                                          Ther Activity                                       Gait Training                                       Modalities

## 2022-08-18 ENCOUNTER — APPOINTMENT (OUTPATIENT)
Dept: PHYSICAL THERAPY | Facility: OTHER | Age: 57
End: 2022-08-18
Payer: COMMERCIAL

## 2022-08-22 ENCOUNTER — OFFICE VISIT (OUTPATIENT)
Dept: PHYSICAL THERAPY | Facility: OTHER | Age: 57
End: 2022-08-22
Payer: COMMERCIAL

## 2022-08-22 DIAGNOSIS — M12.812 ROTATOR CUFF ARTHROPATHY OF LEFT SHOULDER: ICD-10-CM

## 2022-08-22 DIAGNOSIS — M75.102 TEAR OF LEFT SUPRASPINATUS TENDON: Primary | ICD-10-CM

## 2022-08-22 PROCEDURE — 97112 NEUROMUSCULAR REEDUCATION: CPT | Performed by: PHYSICAL THERAPIST

## 2022-08-22 PROCEDURE — 97140 MANUAL THERAPY 1/> REGIONS: CPT | Performed by: PHYSICAL THERAPIST

## 2022-08-22 PROCEDURE — 97110 THERAPEUTIC EXERCISES: CPT | Performed by: PHYSICAL THERAPIST

## 2022-08-22 NOTE — PROGRESS NOTES
Daily Note     Today's date: 2022  Patient name: Peña Hardy  : 1965  MRN: [id number]  Referring provider: Julia Mobley  Dx:   Encounter Diagnosis     ICD-10-CM    1  Tear of left supraspinatus tendon  M75 102    2  Rotator cuff arthropathy of left shoulder  M12 812        Start Time: 1115  Stop Time: 1205  Total time in clinic (min): 50 minutes  1 on 1 for entirety    Subjective: Patient reports occasional neck stiffness  However, denies any shoulder issues  Reports compliance with HEP  Objective: See treatment diary below      Assessment: Tolerated treatment well w/ no symptom provocation  Patient with good tolerance to PROM  Discussed decreasing visits to 1 x a week due to good ROM and compliance with program  Will discuss adding additional visits as he is able to progressive post-operatively in the next phase of his protocol  Plan: Continue per plan of care       Precautions: L RTCR 2022 - protocol attached to chart    Manuals 7/26 8/8 8/11 8/15 8/22        L shoulder ROM  LW JG PROM LW EB        L GH mob - A-P, inferior                                       Neuro Re-Ed             scap squeezes 5"x30 3x10 5" 5" 3x10 10" 2x10 10" 3x10        Elbow PROM flex/ext 30x ea 30x ea 30x ea 30x ea 30ea        Wrist AROM - flex/ext, dev 30x ea 30x ea 30x ea 30x ea 30 ea        pendulums 2' ea 1' ea 1' ea 1' ea 1' ea        Seated thoracic extension  x15 5" 5" x 15 2x10 5" 2 x 10, 5" hold        Supine cane                 2x10 1' blue        AAROM IR  2x10  2x10         Ther Ex             pulleys             Levator stretch    10x10" 10 x 10"        C/s stretching    10x10" ea 10 ea                                                                         Ther Activity                                       Gait Training                                       Modalities

## 2022-08-25 ENCOUNTER — APPOINTMENT (OUTPATIENT)
Dept: PHYSICAL THERAPY | Facility: OTHER | Age: 57
End: 2022-08-25
Payer: COMMERCIAL

## 2022-08-29 ENCOUNTER — OFFICE VISIT (OUTPATIENT)
Dept: PHYSICAL THERAPY | Facility: OTHER | Age: 57
End: 2022-08-29
Payer: COMMERCIAL

## 2022-08-29 DIAGNOSIS — M12.812 ROTATOR CUFF ARTHROPATHY OF LEFT SHOULDER: ICD-10-CM

## 2022-08-29 DIAGNOSIS — M75.102 TEAR OF LEFT SUPRASPINATUS TENDON: Primary | ICD-10-CM

## 2022-08-29 PROCEDURE — 97112 NEUROMUSCULAR REEDUCATION: CPT | Performed by: PHYSICAL THERAPIST

## 2022-08-29 PROCEDURE — 97140 MANUAL THERAPY 1/> REGIONS: CPT | Performed by: PHYSICAL THERAPIST

## 2022-08-29 NOTE — PROGRESS NOTES
Daily Note     Today's date: 2022  Patient name: Enis Boxer  : 1965  MRN: [id number]  Referring provider: Gege Starr*  Dx: No diagnosis found  Subjective: Patient reports no significant symptoms to report upon arrival  He does reports bouts of pain every now and then  Objective: See treatment diary below      Assessment: Tolerated treatment well  PROM was progressed per protocol and patient tolerance  Patient continues to make appropriate progress  Patient will continue to benefit from skilled therapy in order to address deficits and return to PLOF  Plan: Continue per plan of care  Precautions: L RTCR 2022 - protocol attached to chart    Care delivered by APRIL Joaquin, directly supervised by Vicenta Grissom DPT      Manuals 7/26 8/8 8/11 8/15 8/22 8/29       L shoulder ROM  LW JG PROM LW EB EB       L GH mob - A-P, inferior                                       Neuro Re-Ed             scap squeezes 5"x30 3x10 5" 5" 3x10 10" 2x10 10" 3x10 10" 3x10       Elbow PROM flex/ext 30x ea 30x ea 30x ea 30x ea 30ea HEP       Wrist AROM - flex/ext, dev 30x ea 30x ea 30x ea 30x ea 30 ea HEP       pendulums 2' ea 1' ea 1' ea 1' ea 1' ea 1' ea       Seated thoracic extension  x15 5" 5" x 15 2x10 5" 2 x 10, 5" hold 2x10 5" hold       Supine cane                 2x10 1' blue 1' blue       Table slides      x10 flex / abd / scap       AAROM IR  2x10  2x10         Ther Ex             pulleys             Levator stretch    10x10" 10 x 10" 10x10"       C/s stretching    10x10" ea 10 ea                                                                         Ther Activity                                       Gait Training                                       Modalities

## 2022-09-01 ENCOUNTER — APPOINTMENT (OUTPATIENT)
Dept: PHYSICAL THERAPY | Facility: OTHER | Age: 57
End: 2022-09-01
Payer: COMMERCIAL

## 2022-09-06 ENCOUNTER — OFFICE VISIT (OUTPATIENT)
Dept: PHYSICAL THERAPY | Facility: OTHER | Age: 57
End: 2022-09-06
Payer: COMMERCIAL

## 2022-09-06 DIAGNOSIS — M75.102 TEAR OF LEFT SUPRASPINATUS TENDON: Primary | ICD-10-CM

## 2022-09-06 DIAGNOSIS — M12.812 ROTATOR CUFF ARTHROPATHY OF LEFT SHOULDER: ICD-10-CM

## 2022-09-06 PROCEDURE — 97140 MANUAL THERAPY 1/> REGIONS: CPT | Performed by: PHYSICAL THERAPIST

## 2022-09-06 PROCEDURE — 97112 NEUROMUSCULAR REEDUCATION: CPT | Performed by: PHYSICAL THERAPIST

## 2022-09-06 NOTE — PROGRESS NOTES
Daily Note     Today's date: 2022  Patient name: Larry Underwood  : 1965  MRN: [id number]  Referring provider: Leopold Barker*  Dx: No diagnosis found  Subjective: Patient reports no shoulder pain, stiffness, or soreness prior to therapy today  Objective: See treatment diary below      Assessment: The patient demonstrated good tolerance and knowledge of program  No pain noted throughout session today  PROM limited only by protocol restrictions  Plan: Continue per plan of care       Precautions: L RTCR 2022 - protocol attached to chart      Manuals 7/26 8/8 8/11 8/15 8/22 8/29 9/6      L shoulder ROM  LW JG PROM LW EB EB NB      L GH mob - A-P, inferior                                       Neuro Re-Ed             scap squeezes 5"x30 3x10 5" 5" 3x10 10" 2x10 10" 3x10 10" 3x10 10" 3x10      Elbow PROM flex/ext 30x ea 30x ea 30x ea 30x ea 30ea HEP       Wrist AROM - flex/ext, dev 30x ea 30x ea 30x ea 30x ea 30 ea HEP       pendulums 2' ea 1' ea 1' ea 1' ea 1' ea 1' ea 1' ea      Seated thoracic extension  x15 5" 5" x 15 2x10 5" 2 x 10, 5" hold 2x10 5" hold 5" 2x10      Supine cane                 2x10 1' blue 1' blue 1' blue      Table slides      x10 flex / abd / scap x10 flex/ abd/ scap      AAROM IR  2x10  2x10         Ther Ex             pulleys             Levator stretch    10x10" 10 x 10" 10x10" 10x10"      C/s stretching    10x10" ea 10 ea                                                                         Ther Activity                                       Gait Training                                       Modalities

## 2022-09-08 DIAGNOSIS — R12 HEARTBURN: ICD-10-CM

## 2022-09-08 RX ORDER — OMEPRAZOLE 40 MG/1
CAPSULE, DELAYED RELEASE ORAL
Qty: 90 CAPSULE | Refills: 0 | Status: SHIPPED | OUTPATIENT
Start: 2022-09-08

## 2022-09-09 ENCOUNTER — OFFICE VISIT (OUTPATIENT)
Dept: PHYSICAL THERAPY | Facility: OTHER | Age: 57
End: 2022-09-09
Payer: COMMERCIAL

## 2022-09-09 DIAGNOSIS — M12.812 ROTATOR CUFF ARTHROPATHY OF LEFT SHOULDER: ICD-10-CM

## 2022-09-09 DIAGNOSIS — M75.102 TEAR OF LEFT SUPRASPINATUS TENDON: Primary | ICD-10-CM

## 2022-09-09 PROCEDURE — 97112 NEUROMUSCULAR REEDUCATION: CPT | Performed by: PHYSICAL THERAPIST

## 2022-09-09 PROCEDURE — 97140 MANUAL THERAPY 1/> REGIONS: CPT | Performed by: PHYSICAL THERAPIST

## 2022-09-09 PROCEDURE — 97110 THERAPEUTIC EXERCISES: CPT | Performed by: PHYSICAL THERAPIST

## 2022-09-09 NOTE — PROGRESS NOTES
Daily Note     Today's date: 2022  Patient name: Tenisha Vega  : 1965  MRN: [id number]  Referring provider: Samm Acosta*  Dx: No diagnosis found  Subjective: Patient reports no significant symptoms upon arrival       Objective: See treatment diary below      Assessment: Tolerated treatment well w/ no adverse effects  AAROM was progressed per protocol, and patient tolerated all additions well  AROM was initiated to 90 degrees and tolerated well, and patient was advised to begin including this into HEP  Patient was advised to discontinue sling around the house, but was advised to keep sling on during travel next week  Patient will continue to benefit from skilled therapy in order to address deficits and return to PLOF  Plan: Continue per plan of care  Precautions: L RTCR 2022 - protocol attached to chart    Care delivered by APRIL Berry, directly supervised by Yehuda Gonzalez DPT      Manuals 7/26 8/8 8/11 8/15 8/22 8/29 9/6 9/9     L shoulder ROM  LW JG PROM LW EB EB NB LW     L GH mob - A-P, inferior                                       Neuro Re-Ed             scap squeezes 5"x30 3x10 5" 5" 3x10 10" 2x10 10" 3x10 10" 3x10 10" 3x10      Elbow PROM flex/ext 30x ea 30x ea 30x ea 30x ea 30ea HEP       Wrist AROM - flex/ext, dev 30x ea 30x ea 30x ea 30x ea 30 ea HEP       pendulums 2' ea 1' ea 1' ea 1' ea 1' ea 1' ea 1' ea      Seated thoracic extension  x15 5" 5" x 15 2x10 5" 2 x 10, 5" hold 2x10 5" hold 5" 2x10 5" 2x10     Supine cane                 2x10 1' blue 1' blue 1' blue      Table slides      x10 flex / abd / scap x10 flex/ abd/ scap x10 flex / abd / scap     AAROM IR  2x10  2x10         Ther Ex             pulleys        3'     Levator stretch    10x10" 10 x 10" 10x10" 10x10"      C/s stretching    10x10" ea 10 ea        Finger ladder             [place]        x10 flex / scap / abd     AROM to 90        x10 Flex / Oscar [place] / abd Ther Activity                                       Gait Training                                       Modalities

## 2022-09-12 ENCOUNTER — APPOINTMENT (OUTPATIENT)
Dept: PHYSICAL THERAPY | Facility: OTHER | Age: 57
End: 2022-09-12
Payer: COMMERCIAL

## 2022-09-15 ENCOUNTER — APPOINTMENT (OUTPATIENT)
Dept: PHYSICAL THERAPY | Facility: OTHER | Age: 57
End: 2022-09-15
Payer: COMMERCIAL

## 2022-09-19 ENCOUNTER — OFFICE VISIT (OUTPATIENT)
Dept: PHYSICAL THERAPY | Facility: OTHER | Age: 57
End: 2022-09-19
Payer: COMMERCIAL

## 2022-09-19 DIAGNOSIS — M12.812 ROTATOR CUFF ARTHROPATHY OF LEFT SHOULDER: ICD-10-CM

## 2022-09-19 DIAGNOSIS — M75.102 TEAR OF LEFT SUPRASPINATUS TENDON: Primary | ICD-10-CM

## 2022-09-19 PROCEDURE — 97110 THERAPEUTIC EXERCISES: CPT

## 2022-09-19 PROCEDURE — 97112 NEUROMUSCULAR REEDUCATION: CPT

## 2022-09-19 PROCEDURE — 97140 MANUAL THERAPY 1/> REGIONS: CPT

## 2022-09-19 NOTE — PROGRESS NOTES
Daily Note     Today's date: 2022  Patient name: Josué Buckley  : 1965  MRN: [id number]  Referring provider: Gerber Orozco  Dx:   Encounter Diagnosis     ICD-10-CM    1  Tear of left supraspinatus tendon  M75 102    2  Rotator cuff arthropathy of left shoulder  M12 812                   Subjective: "My shoulder feels pretty good "      Objective: See treatment diary below      Assessment: Good AROM noted today, no upper trap compensation  Tight in flexion, abduction, and scaption PROM, but full with ranger  Added RTC iso today without issues  Continue to progress per protocol as tolerated  Plan: Continue per plan of care  Precautions: L RTCR 2022 - protocol attached to chart    Care delivered by APRIL Tanner, directly supervised by Fernando Gibson DPT      Manuals 7/26 8/8 8/11 8/15 8/22 8/29 9/6 9/9 9/19    L shoulder ROM  LW JG PROM LW EB EB NB LW KA    L GH mob - A-P, inferior                                       Neuro Re-Ed             scap squeezes 5"x30 3x10 5" 5" 3x10 10" 2x10 10" 3x10 10" 3x10 10" 3x10      Elbow PROM flex/ext 30x ea 30x ea 30x ea 30x ea 30ea HEP       Wrist AROM - flex/ext, dev 30x ea 30x ea 30x ea 30x ea 30 ea HEP       pendulums 2' ea 1' ea 1' ea 1' ea 1' ea 1' ea 1' ea      Seated thoracic extension  x15 5" 5" x 15 2x10 5" 2 x 10, 5" hold 2x10 5" hold 5" 2x10 5" 2x10     Supine cane         nv        2x10 1' blue 1' blue 1' blue      Table slides      x10 flex / abd / scap x10 flex/ abd/ scap x10 flex / abd / scap     AAROM IR  2x10  2x10         RTC iso - flex, abd, ER, IR, ext         5"x10 ea    Ther Ex             pulleys        3' 5'    Levator stretch    10x10" 10 x 10" 10x10" 10x10"      C/s stretching    10x10" ea 10 ea        Finger ladder             [place]        x10 flex / Christy Pole / abd 10"x10 flex, abd, scap    AROM to 90        x10 Flex / scap / abd 2x10 flex, scap, abd                              Ther Activity Gait Training                                       Modalities

## 2022-09-23 ENCOUNTER — OFFICE VISIT (OUTPATIENT)
Dept: PHYSICAL THERAPY | Facility: OTHER | Age: 57
End: 2022-09-23
Payer: COMMERCIAL

## 2022-09-23 DIAGNOSIS — M75.102 TEAR OF LEFT SUPRASPINATUS TENDON: Primary | ICD-10-CM

## 2022-09-23 DIAGNOSIS — M12.812 ROTATOR CUFF ARTHROPATHY OF LEFT SHOULDER: ICD-10-CM

## 2022-09-23 PROCEDURE — 97110 THERAPEUTIC EXERCISES: CPT | Performed by: PHYSICAL THERAPIST

## 2022-09-23 PROCEDURE — 97140 MANUAL THERAPY 1/> REGIONS: CPT | Performed by: PHYSICAL THERAPIST

## 2022-09-23 PROCEDURE — 97112 NEUROMUSCULAR REEDUCATION: CPT | Performed by: PHYSICAL THERAPIST

## 2022-09-23 NOTE — PROGRESS NOTES
Daily Note     Today's date: 2022  Patient name: Gayle Acuña  : 1965  MRN: [id number]  Referring provider: Nadeem Edge  Dx:   Encounter Diagnosis     ICD-10-CM    1  Tear of left supraspinatus tendon  M75 102    2  Rotator cuff arthropathy of left shoulder  M12 812        Start Time: 1115  Stop Time: 1200  Total time in clinic (min): 45 minutes    Subjective: Patient reports mild soreness in his shoulder after sleeping in his car while traveling for work  However, patient states he did well with last visits progression  Objective: See treatment diary below      Assessment: Good AROM noted today, no upper trap compensation  Progresed AROM reps without difficulty  Added cane stretch to help achieve end-range ROM  Educated patient on continuing table slides, cane stretch home  Also provided patient with pullies for home  Plan: Continue per plan of care  Continue to progress per protocol as tolerated          Precautions: L RTCR 2022 - protocol attached to chart    Manuals 8/8 8/11 8/15 8/22 8/29 9/6 9/9 9/19 9/23    L shoulder ROM LW JG PROM LW EB EB NB LW KA EB    L GH mob - A-P, inferior                                       Neuro Re-Ed             scap squeezes 3x10 5" 5" 3x10 10" 2x10 10" 3x10 10" 3x10 10" 3x10          2x10 1' blue 1' blue 1' blue       Table slides     x10 flex / abd / scap x10 flex/ abd/ scap x10 flex / abd / scap      AAROM IR 2x10  2x10          RTC iso - flex, abd, ER, IR, ext        5"x10 ea     SL ER                          Ther Ex             pulleys       3' 5' 5'    Levator stretch   10x10" 10 x 10" 10x10" 10x10"       C/s stretching   10x10" ea 10 ea         Finger ladder             [place]       x10 flex / [place] / abd 10"x10 flex, abd, scap 10"x10 flex, abd, scap    AROM to 90       x10 Flex / scap / abd 2x10 flex, scap, abd 3x10 flex, scap, abd    Supine cane AAROM                          Ther Activity Gait Training                                       Modalities

## 2022-09-26 ENCOUNTER — OFFICE VISIT (OUTPATIENT)
Dept: PHYSICAL THERAPY | Facility: OTHER | Age: 57
End: 2022-09-26
Payer: COMMERCIAL

## 2022-09-26 DIAGNOSIS — M12.812 ROTATOR CUFF ARTHROPATHY OF LEFT SHOULDER: Primary | ICD-10-CM

## 2022-09-26 DIAGNOSIS — M75.102 TEAR OF LEFT SUPRASPINATUS TENDON: ICD-10-CM

## 2022-09-26 PROCEDURE — 97110 THERAPEUTIC EXERCISES: CPT | Performed by: PHYSICAL THERAPIST

## 2022-09-26 PROCEDURE — 97140 MANUAL THERAPY 1/> REGIONS: CPT | Performed by: PHYSICAL THERAPIST

## 2022-09-26 PROCEDURE — 97112 NEUROMUSCULAR REEDUCATION: CPT | Performed by: PHYSICAL THERAPIST

## 2022-09-26 NOTE — PROGRESS NOTES
Daily Note     Today's date: 2022  Patient name: Johanna Rueda  : 1965  MRN: [id number]  Referring provider: Lencho Esparza  Dx:   Encounter Diagnosis     ICD-10-CM    1  Rotator cuff arthropathy of left shoulder  M12 812    2  Tear of left supraspinatus tendon  M75 102                 1 on 1 0067-105, not billed remainder  Subjective: Patient reports mild soreness for a few hours after last session  States he did use ice which alleviated his soreness  Denies any pain  Reports overall his shoulder is feeling good  Objective: See treatment diary below      Assessment: Good AROM noted today, no upper trap compensation  Patient tolerated session well with no increase in symptoms  Plan: Continue per plan of care  Continue to progress per protocol as tolerated          Precautions: L RTCR 2022 - protocol attached to chart    Manuals 8/11 8/15 8/22 8/29 9/6 9/9 9/19 9/23 9/26   L shoulder ROM JG PROM LW EB EB NB LW KA EB EB   L GH mob - A-P, inferior         EB, gr 3                           Neuro Re-Ed            scap squeezes 5" 3x10 10" 2x10 10" 3x10 10" 3x10 10" 3x10         2x10 1' blue 1' blue 1' blue       Table slides    x10 flex / abd / scap x10 flex/ abd/ scap x10 flex / abd / scap      AAROM IR  2x10          RTC iso - flex, abd, ER, IR, ext       5"x10 ea     SL ER                        Ther Ex            pulleys      3' 5' 5' 5'   Levator stretch  10x10" 10 x 10" 10x10" 10x10"       C/s stretching  10x10" ea 10 ea         Finger ladder            [place]      x10 flex / Chetna Arbour / abd 10"x10 flex, abd, scap 10"x10 flex, abd, scap 20 ea   AROM to 90      x10 Flex / scap / abd 2x10 flex, scap, abd 3x10 flex, scap, abd 30 ea   Supine cane AAROM                        Ther Activity                                    Gait Training                                    Modalities

## 2022-09-30 ENCOUNTER — APPOINTMENT (OUTPATIENT)
Dept: PHYSICAL THERAPY | Facility: OTHER | Age: 57
End: 2022-09-30
Payer: COMMERCIAL

## 2022-10-03 ENCOUNTER — OFFICE VISIT (OUTPATIENT)
Dept: OBGYN CLINIC | Facility: OTHER | Age: 57
End: 2022-10-03

## 2022-10-03 VITALS
DIASTOLIC BLOOD PRESSURE: 81 MMHG | BODY MASS INDEX: 32.38 KG/M2 | HEIGHT: 70 IN | SYSTOLIC BLOOD PRESSURE: 130 MMHG | HEART RATE: 53 BPM | WEIGHT: 226.2 LBS

## 2022-10-03 DIAGNOSIS — Z47.89 AFTERCARE FOLLOWING SURGERY OF THE MUSCULOSKELETAL SYSTEM: Primary | ICD-10-CM

## 2022-10-03 PROCEDURE — 99024 POSTOP FOLLOW-UP VISIT: CPT | Performed by: PHYSICIAN ASSISTANT

## 2022-10-03 NOTE — LETTER
October 3, 2022     Patient: Vladislav Alvarenga  YOB: 1965  Date of Visit: 10/3/2022      To Whom it May Concern:    Carlette Landau is under my professional care  Valeriy President was seen in my office on 10/3/2022  Valeriy President may return to work on 10/4/2022 and may return to work with limitations no lifting, pulling, or pushing with left arm  If these accomodations cannot be met, then he is to remain out of work       If you have any questions or concerns, please don't hesitate to call           Sincerely,          Geovanna Dan PA-C        CC: No Recipients

## 2022-10-03 NOTE — PROGRESS NOTES
Assessment:       1  Aftercare following surgery of the musculoskeletal system          Plan:        Patient is doing well postoperatively  He is making good progress in PT  All questions were addressed to the patient's satisfaction  Patient will continue rehab protocol  A return to work not has been placed in his chart  Follow-up will be in 2 months to assess patient's progress  Subjective:     Patient ID: Marquez Avalos is a 61 y o  male  Chief Complaint:    HPI    The patient presents to the office for follow-up status post left shoulder arthroscopy with rotator cuff repair biceps tenodesis on 07/29/2022  He has no new complaints  He is making progress in PT  Social History     Occupational History    Occupation:      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    Tobacco Use    Smoking status: Former Smoker     Quit date: 2007     Years since quitting: 15 7    Smokeless tobacco: Never Used   Vaping Use    Vaping Use: Some days    Substances: THC   Substance and Sexual Activity    Alcohol use: Yes     Comment: occ    Drug use: No    Sexual activity: Yes     Partners: Female      Review of Systems   Constitutional: Negative  Respiratory: Negative  Cardiovascular: Negative  Gastrointestinal: Negative  Genitourinary: Negative  Musculoskeletal: Positive for myalgias and neck stiffness  Negative for arthralgias  Neurological: Positive for weakness and headaches  Negative for numbness  Hematological: Negative  Psychiatric/Behavioral: Negative  Objective:     Ortho ExamPhysical Exam  HENT:      Head: Atraumatic  Cardiovascular:      Pulses: Normal pulses  Pulmonary:      Effort: Pulmonary effort is normal    Musculoskeletal:      Comments: Active left shoulder: Forward flexion 135°, external rotation 90°, internal rotation to lumbar spine  Neurological:      Mental Status: He is alert and oriented to person, place, and time  Sensory: Sensory deficit present     Psychiatric:         Mood and Affect: Mood normal          Behavior: Behavior normal

## 2022-10-07 ENCOUNTER — APPOINTMENT (OUTPATIENT)
Dept: PHYSICAL THERAPY | Facility: OTHER | Age: 57
End: 2022-10-07

## 2022-10-10 ENCOUNTER — OFFICE VISIT (OUTPATIENT)
Dept: PHYSICAL THERAPY | Facility: OTHER | Age: 57
End: 2022-10-10
Payer: COMMERCIAL

## 2022-10-10 DIAGNOSIS — M75.102 TEAR OF LEFT SUPRASPINATUS TENDON: ICD-10-CM

## 2022-10-10 DIAGNOSIS — M12.812 ROTATOR CUFF ARTHROPATHY OF LEFT SHOULDER: Primary | ICD-10-CM

## 2022-10-10 PROCEDURE — 97112 NEUROMUSCULAR REEDUCATION: CPT | Performed by: PHYSICAL THERAPIST

## 2022-10-10 PROCEDURE — 97140 MANUAL THERAPY 1/> REGIONS: CPT | Performed by: PHYSICAL THERAPIST

## 2022-10-10 PROCEDURE — 97110 THERAPEUTIC EXERCISES: CPT | Performed by: PHYSICAL THERAPIST

## 2022-10-10 NOTE — PROGRESS NOTES
Daily Note     Today's date: 10/10/2022  Patient name: Sesar Pierce  : 1965  MRN: [id number]  Referring provider: Marlene Vaughan  Dx:   Encounter Diagnosis     ICD-10-CM    1  Rotator cuff arthropathy of left shoulder  M12 812    2  Tear of left supraspinatus tendon  M75 102        Start Time: 1115  Stop Time: 8602  Total time in clinic (min): 60 minutes  1 on 1 from [id number], not billed remainder    Subjective: Patient reports soreness from travel and folding laundry  However, denies any major issues since last session  Objective: See treatment diary below      Assessment: Good AROM noted today, no upper trap compensation  Patient tolerated session well with no increase in symptoms  Plan: Continue per plan of care  Continue to progress per protocol as tolerated          Precautions: L RTCR 2022 - protocol attached to chart    Manuals 8/11 8/15 8/22 8/29 9/6 9/9 9/19 9/23 9/26 1010   L shoulder ROM JG PROM LW EB EB NB LW KA EB EB EB   L GH mob - A-P, inferior         EB, gr 3 EB gr 3                             Neuro Re-Ed             scap squeezes 5" 3x10 10" 2x10 10" 3x10 10" 3x10 10" 3x10          2x10 1' blue 1' blue 1' blue        Table slides    x10 flex / abd / scap x10 flex/ abd/ scap x10 flex / abd / scap       AAROM IR  2x10           RTC iso - flex, abd, ER, IR, ext       5"x10 ea   5" x 10    SL ER          30                Ther Ex             pulleys      3' 5' 5' 5' 5'   Levator stretch  10x10" 10 x 10" 10x10" 10x10"        Upper trap stretching  10x10" ea 10 ea       10 x 10"   Finger ladder             [place]      x10 flex / Lemond Jazmine / abd 10"x10 flex, abd, scap 10"x10 flex, abd, scap 20 ea 20   AROM to 90      x10 Flex / scap / abd 2x10 flex, scap, abd 3x10 flex, scap, abd 30 ea 30    Supine cane AAROM                          Ther Activity                                       Gait Training                                       Modalities

## 2022-10-14 ENCOUNTER — OFFICE VISIT (OUTPATIENT)
Dept: PHYSICAL THERAPY | Facility: OTHER | Age: 57
End: 2022-10-14
Payer: COMMERCIAL

## 2022-10-14 DIAGNOSIS — M12.812 ROTATOR CUFF ARTHROPATHY OF LEFT SHOULDER: Primary | ICD-10-CM

## 2022-10-14 PROCEDURE — 97110 THERAPEUTIC EXERCISES: CPT | Performed by: PHYSICAL THERAPIST

## 2022-10-14 PROCEDURE — 97140 MANUAL THERAPY 1/> REGIONS: CPT | Performed by: PHYSICAL THERAPIST

## 2022-10-14 PROCEDURE — 97112 NEUROMUSCULAR REEDUCATION: CPT | Performed by: PHYSICAL THERAPIST

## 2022-10-14 NOTE — PROGRESS NOTES
Daily Note     Today's date: 10/14/2022  Patient name: Adams Boxer  : 1965  MRN: [id number]  Referring provider: Gokul Nava  Dx:   Encounter Diagnosis     ICD-10-CM    1  Rotator cuff arthropathy of left shoulder  M12 812        Start Time: 1115  Stop Time: 1200  Total time in clinic (min): 45 minutes  1 on 1 from [id number], not billed remainder    Subjective: Patient reports neck pain and tightness in upper trap as his primary complaint  Objective: See treatment diary below      Assessment: Good AROM noted today, no upper trap compensation  Patient tolerated session well with no increase in symptoms  Noted trigger points in upper trap and levator  Patient responds well to ISTM  Consider adding cervical mobs if continued referred pain into the neck  Plan: Continue per plan of care  Continue to progress per protocol as tolerated          Precautions: L RTCR 2022 - protocol attached to chart    Manuals 8/11 8/15 8/22 8/29 9/6 9/9 9/19 9/23 9/26 10/10 10/14   L shoulder ROM JG PROM LW EB EB NB LW KA EB EB EB EB   L GH mob - A-P, inferior         EB, gr 3 EB gr 3 EB gr 3   ISTM UT           EB                 Neuro Re-Ed              scap squeezes 5" 3x10 10" 2x10 10" 3x10 10" 3x10 10" 3x10           2x10 1' blue 1' blue 1' blue         Table slides    x10 flex / abd / scap x10 flex/ abd/ scap x10 flex / abd / scap        AAROM IR  2x10            RTC iso - flex, abd, ER, IR, ext       5"x10 ea   5" x 10  5" x 10    SL ER          30 30                  Ther Ex              pulleys      3' 5' 5' 5' 5' 5'    Levator stretch  10x10" 10 x 10" 10x10" 10x10"         Upper trap stretching  10x10" ea 10 ea       10 x 10" 10 x 10"   Finger ladder           10    [place]      x10 flex / Arbie Leeks / abd 10"x10 flex, abd, scap 10"x10 flex, abd, scap 20 ea 20 20   AROM to 90      x10 Flex / scap / abd 2x10 flex, scap, abd 3x10 flex, scap, abd 30 ea 30  30 full   Supine cane AAROM Ther Activity                                          Gait Training                                          Modalities

## 2022-10-17 ENCOUNTER — OFFICE VISIT (OUTPATIENT)
Dept: PHYSICAL THERAPY | Facility: OTHER | Age: 57
End: 2022-10-17
Payer: COMMERCIAL

## 2022-10-17 DIAGNOSIS — M12.812 ROTATOR CUFF ARTHROPATHY OF LEFT SHOULDER: Primary | ICD-10-CM

## 2022-10-17 DIAGNOSIS — M75.102 TEAR OF LEFT SUPRASPINATUS TENDON: ICD-10-CM

## 2022-10-17 PROCEDURE — 97110 THERAPEUTIC EXERCISES: CPT

## 2022-10-17 PROCEDURE — 97112 NEUROMUSCULAR REEDUCATION: CPT

## 2022-10-17 PROCEDURE — 97140 MANUAL THERAPY 1/> REGIONS: CPT

## 2022-10-17 NOTE — PROGRESS NOTES
Daily Note     Today's date: 10/17/2022  Patient name: Fernando Ford  : 1965  MRN: [id number]  Referring provider: Yudelka Campos  Dx:   Encounter Diagnosis     ICD-10-CM    1  Rotator cuff arthropathy of left shoulder  M12 812    2  Tear of left supraspinatus tendon  M75 102                  Subjective: Patient reports left shoulder is feeling good but continued tightness in left UT  Objective: See treatment diary below      Assessment: Pain free AROM and PROM today  Good technique with exercises  Progressing well with protocol  Patient starting 8 weeks post op  Plan: Continue per plan of care  Continue to progress per protocol as tolerated          Precautions: L RTCR 2022 - protocol attached to chart    Manuals 10/17 8/15 8/22 8/29 9/6 9/9 9/19 9/23 9/26 10/10 10/14   L shoulder ROM ksg LW EB EB NB LW KA EB EB EB EB   L GH mob - A-P, inferior ksg Gr 3        EB, gr 3 EB gr 3 EB gr 3   ISTM UT ksg          EB                 Neuro Re-Ed              scap squeezes  10" 2x10 10" 3x10 10" 3x10 10" 3x10           2x10 1' blue 1' blue 1' blue         Table slides    x10 flex / abd / scap x10 flex/ abd/ scap x10 flex / abd / scap        AAROM IR  2x10            RTC iso - flex, abd, ER, IR, ext 5" x10 ea      5"x10 ea   5" x 10  5" x 10    SL ER x30         30 30                  Ther Ex              pulleys 5'     3' 5' 5' 5' 5' 5'    Levator stretch  10x10" 10 x 10" 10x10" 10x10"         Upper trap stretching  10x10" ea 10 ea       10 x 10" 10 x 10"   Finger ladder 5" x10          10    [place] 20     x10 flex / scap / abd 10"x10 flex, abd, scap 10"x10 flex, abd, scap 20 ea 20 20   AROM to 90 30     x10 Flex / scap / abd 2x10 flex, scap, abd 3x10 flex, scap, abd 30 ea 30  30 full   Supine cane AAROM                            Ther Activity                                          Gait Training                                          Modalities

## 2022-10-21 ENCOUNTER — APPOINTMENT (OUTPATIENT)
Dept: PHYSICAL THERAPY | Facility: OTHER | Age: 57
End: 2022-10-21

## 2022-10-24 ENCOUNTER — OFFICE VISIT (OUTPATIENT)
Dept: PHYSICAL THERAPY | Facility: OTHER | Age: 57
End: 2022-10-24
Payer: COMMERCIAL

## 2022-10-24 DIAGNOSIS — M12.812 ROTATOR CUFF ARTHROPATHY OF LEFT SHOULDER: Primary | ICD-10-CM

## 2022-10-24 PROCEDURE — 97110 THERAPEUTIC EXERCISES: CPT | Performed by: PHYSICAL THERAPIST

## 2022-10-24 PROCEDURE — 97140 MANUAL THERAPY 1/> REGIONS: CPT | Performed by: PHYSICAL THERAPIST

## 2022-10-24 PROCEDURE — 97112 NEUROMUSCULAR REEDUCATION: CPT | Performed by: PHYSICAL THERAPIST

## 2022-10-24 NOTE — PROGRESS NOTES
Daily Note     Today's date: 10/24/2022  Patient name: Vipul Coleman  : 1965  MRN: [id number]  Referring provider: Abbott Grounds  Dx:   Encounter Diagnosis     ICD-10-CM    1  Rotator cuff arthropathy of left shoulder  M12 812        Start Time: 111  Stop Time: 1215  1 on 1 from 0429-8225, not billed remainder    Subjective: Patient reports continued tightness in his upper trap, causing a bad headache this week  Objective: See treatment diary below      Assessment:  Added myofascial decompression with improvement in upper trap flexibility post manual therapy  Added active cervical ROM as well  Patient progressed to strengthening phase per post-op protocol  Patient educated on lifting restrictions at this time  Patient requiring cueing to avoid upper trap compensation with TB rows this visit  Improvements noted in ROM this visit  Flexion continues to be limited at end-range  IR also continues to be limited  However, all other motions full and pain free  Plan: Continue per plan of care  Continue to progress per protocol as tolerated          Precautions: L RTCR 2022 - protocol attached to chart, 10# lifting restriction    Manuals 8/15 8/22 8/29 9/6 9/9 9/19 9/23 9/26 10/10 10/14 10/24   L shoulder ROM LW EB EB NB LW KA EB EB EB EB EB   L GH mob - A-P, inferior        EB, gr 3 EB gr 3 EB gr 3 EB   ISTM UT          EB EB                 Neuro Re-Ed              scap squeezes 10" 2x10 10" 3x10 10" 3x10 10" 3x10           2x10 1' blue 1' blue 1' blue          Table slides   x10 flex / abd / scap x10 flex/ abd/ scap x10 flex / abd / scap         AAROM IR 2x10             RTC iso - flex, abd, ER, IR, ext      5"x10 ea   5" x 10  5" x 10     SL ER         30 30  2 x 10, 2#   PBALL YTI           10 x ea   TB LPD/Row           2 x 10, OTB   TB ER/IR              Bent over row                            Ther Ex              pulleys     3' 5' 5' 5' 5' 5'  5'   Levator stretch 10x10" 10 x 10" 10x10" 10x10"          Upper trap stretching 10x10" ea 10 ea       10 x 10" 10 x 10" 10 x 10"   Finger ladder          10  10x   [place]     x10 flex / scap / abd 10"x10 flex, abd, scap 10"x10 flex, abd, scap 20 ea 20 20 20   AROM to 90     x10 Flex / scap / abd 2x10 flex, scap, abd 3x10 flex, scap, abd 30 ea 30  30 full 30 full   Cervical AROM           10 ea                 Ther Activity                                          Gait Training                                          Modalities

## 2022-10-28 ENCOUNTER — OFFICE VISIT (OUTPATIENT)
Dept: PHYSICAL THERAPY | Facility: OTHER | Age: 57
End: 2022-10-28
Payer: COMMERCIAL

## 2022-10-28 DIAGNOSIS — M75.102 TEAR OF LEFT SUPRASPINATUS TENDON: ICD-10-CM

## 2022-10-28 DIAGNOSIS — M12.812 ROTATOR CUFF ARTHROPATHY OF LEFT SHOULDER: Primary | ICD-10-CM

## 2022-10-28 PROCEDURE — 97112 NEUROMUSCULAR REEDUCATION: CPT | Performed by: PEDIATRICS

## 2022-10-28 PROCEDURE — 97110 THERAPEUTIC EXERCISES: CPT | Performed by: PEDIATRICS

## 2022-10-28 PROCEDURE — 97140 MANUAL THERAPY 1/> REGIONS: CPT | Performed by: PEDIATRICS

## 2022-10-28 NOTE — PROGRESS NOTES
Daily Note     Today's date: 10/28/2022  Patient name: Meek Zepeda  : 1965  MRN: [id number]  Referring provider: Misael Renteria  Dx:   Encounter Diagnosis     ICD-10-CM    1  Rotator cuff arthropathy of left shoulder  M12 812    2  Tear of left supraspinatus tendon  M75 102              1:1 with PTA for duration of treatment session  Subjective: Patient reports relief with cupping performed last treatment session  Objective: See treatment diary below      Assessment:  Continued with MFDc  Continues to offer patient relief  Decreased upper trap compensation noted today  Plan: Continue per plan of care  Continue to progress per protocol as tolerated          Precautions: L RTCR 2022 - protocol attached to chart, 10# lifting restriction    Manuals 9/19 9/23 9/26 10/10 10/14 10/24 10/28   L shoulder ROM KA EB EB EB EB EB EW   L GH mob - A-P, inferior   EB, gr 3 EB gr 3 EB gr 3 EB    ISTM UT     EB EB EW   MFDc       EW   Neuro Re-Ed          scap squeezes                    Table slides          AAROM IR          RTC iso - flex, abd, ER, IR, ext 5"x10 ea   5" x 10  5" x 10      SL ER    30 30  2 x 10, 2# 2x10 2#   PBALL YTI      10 x ea 10x ea   TB LPD/Row      2 x 10, OTB 2 x 10, OTB   TB ER/IR          Bent over row                    Ther Ex          pulleys 5' 5' 5' 5' 5'  5' 5'   Levator stretch          Upper trap stretching    10 x 10" 10 x 10" 10 x 10"    Finger ladder     10  10x 10x   [place] 10"x10 flex, abd, scap 10"x10 flex, abd, scap 20 ea 20 20 20 20   AROM to 90 2x10 flex, scap, abd 3x10 flex, scap, abd 30 ea 30  30 full 30 full 30 full   Cervical AROM      10 ea              Ther Activity                              Gait Training                              Modalities

## 2022-10-31 ENCOUNTER — OFFICE VISIT (OUTPATIENT)
Dept: PHYSICAL THERAPY | Facility: OTHER | Age: 57
End: 2022-10-31

## 2022-10-31 DIAGNOSIS — M12.812 ROTATOR CUFF ARTHROPATHY OF LEFT SHOULDER: Primary | ICD-10-CM

## 2022-10-31 DIAGNOSIS — M75.102 TEAR OF LEFT SUPRASPINATUS TENDON: ICD-10-CM

## 2022-10-31 NOTE — PROGRESS NOTES
Daily Note     Today's date: 10/31/2022  Patient name: Glenard Paget  : 1965  MRN: [id number]  Referring provider: Chace Mcdaniels  Dx:   Encounter Diagnosis     ICD-10-CM    1  Rotator cuff arthropathy of left shoulder  M12 812    2  Tear of left supraspinatus tendon  M75 102              1:1 with PT for entirety    Subjective: Patient reports relief with cupping performed over the last few sessions  Objective: See treatment diary below      Assessment:  Continued with MFDc  Continues to offer patient relief  Decreased upper trap compensation noted today  Plan: Continue per plan of care  Continue to progress per protocol as tolerated          Precautions: L RTCR 2022 - protocol attached to chart, 10# lifting restriction    Manuals 9/19 9/23 9/26 10/10 10/14 10/24 10/28 10/31   L shoulder ROM KA EB EB EB EB EB EW EB   L GH mob - A-P, inferior   EB, gr 3 EB gr 3 EB gr 3 EB  EB   ISTM UT     EB EB EW EB   MFDc       EW EB   Neuro Re-Ed           scap squeezes                      Table slides           AAROM IR           RTC iso - flex, abd, ER, IR, ext 5"x10 ea   5" x 10  5" x 10       SL ER    30 30  2 x 10, 2# 2x10 2# 3 x 10, 2#   scap punch        2 x 10, 1#   PBALL YTI      10 x ea 10x ea 2 x 10   TB LPD/Row      2 x 10, OTB 2 x 10, OTB 3 x 10, OTB   TB ER/IR        2 x 10, OTB   Bent over row        4# 2 x 10               Ther Ex           pulleys 5' 5' 5' 5' 5'  5' 5' 5'   Levator stretch           Upper trap stretching    10 x 10" 10 x 10" 10 x 10"     Finger ladder     10  10x 10x    [place] 10"x10 flex, abd, scap 10"x10 flex, abd, scap 20 ea 20 20 20 20    AROM to 90 2x10 flex, scap, abd 3x10 flex, scap, abd 30 ea 30  30 full 30 full 30 full 30, 1#   Cervical AROM      10 ea  10 ea MFDc              Ther Activity                                 Gait Training                                 Modalities

## 2022-11-04 ENCOUNTER — OFFICE VISIT (OUTPATIENT)
Dept: PHYSICAL THERAPY | Facility: OTHER | Age: 57
End: 2022-11-04

## 2022-11-04 DIAGNOSIS — M12.812 ROTATOR CUFF ARTHROPATHY OF LEFT SHOULDER: Primary | ICD-10-CM

## 2022-11-04 NOTE — PROGRESS NOTES
Daily Note     Today's date: 2022  Patient name: Meek Zepeda  : 1965  MRN: [id number]  Referring provider: Misael Renteria  Dx:   Encounter Diagnosis     ICD-10-CM    1  Rotator cuff arthropathy of left shoulder  M12 812        Start Time: 5  Stop Time: 1200  1:1 with PT for entirety    Subjective: Patient reports relief with cupping performed over the last few sessions  Objective: See treatment diary below      Assessment:  Patient tolerated program without complaints  Continue to progress as tolerate  d      Plan: Continue per plan of care  Continue to progress per protocol as tolerated          Precautions: L RTCR 2022 - protocol attached to chart, 10# lifting restriction    Manuals 9/19 9/23 9/26 10/10 10/14 10/24 10/28 10/31 11/04   L shoulder ROM KA EB EB EB EB EB EW EB MS   L GH mob - A-P, inferior   EB, gr 3 EB gr 3 EB gr 3 EB  EB Ms   ISTM UT     EB EB EW EB Ms   MFDc       EW EB MS   Neuro Re-Ed            scap squeezes                        Table slides            AAROM IR            RTC iso - flex, abd, ER, IR, ext 5"x10 ea   5" x 10  5" x 10        SL ER    30 30  2 x 10, 2# 2x10 2# 3 x 10, 2# 3 x 15 3#   scap punch        2 x 10, 1# 2 x 10 #   PBALL YTI      10 x ea 10x ea 2 x 10 2 x10 1#   TB LPD/Row      2 x 10, OTB 2 x 10, OTB 3 x 10, OTB TB Row 3 x 10    TB ER/IR        2 x 10, OTB 2 x 15 OTB    Bent over row        4# 2 x 10  4# 2 x 10                Ther Ex            pulleys 5' 5' 5' 5' 5'  5' 5' 5' 5'   Levator stretch            Upper trap stretching    10 x 10" 10 x 10" 10 x 10"   10 x 10"   Finger ladder     10  10x 10x     [place] 10"x10 flex, abd, scap 10"x10 flex, abd, scap 20 ea 20 20 20 20     AROM to 90 2x10 flex, scap, abd 3x10 flex, scap, abd 30 ea 30  30 full 30 full 30 full 30, 1# 30, 1#   Cervical AROM      10 ea  10 ea MFDc                Ther Activity                                    Gait Training Modalities

## 2022-11-07 ENCOUNTER — OFFICE VISIT (OUTPATIENT)
Dept: PHYSICAL THERAPY | Facility: OTHER | Age: 57
End: 2022-11-07

## 2022-11-07 DIAGNOSIS — M75.102 TEAR OF LEFT SUPRASPINATUS TENDON: ICD-10-CM

## 2022-11-07 DIAGNOSIS — M12.812 ROTATOR CUFF ARTHROPATHY OF LEFT SHOULDER: Primary | ICD-10-CM

## 2022-11-07 NOTE — PROGRESS NOTES
Daily Note     Today's date: 2022  Patient name: Niyah Saez  : 1965  MRN: [id number]  Referring provider: Kiya Quintana  Dx:   Encounter Diagnosis     ICD-10-CM    1  Rotator cuff arthropathy of left shoulder  M12 812    2  Tear of left supraspinatus tendon  M75 102              1:1 with PT for entirety    Subjective: Patient reports mild discomfort in neck is the only complaint he has  Objective: See treatment diary below      Assessment:  Patient tolerated program without complaints  Responds well to cupping  Continue to progress as tolerated  Plan: Continue per plan of care  Continue to progress per protocol as tolerated          Precautions: L RTCR 2022 - protocol attached to chart, 10# lifting restriction    Manuals 9/26 10/10 10/14 10/24 10/28 10/31 11/04 11/7   L shoulder ROM EB EB EB EB EW EB MS EW   L GH mob - A-P, inferior EB, gr 3 EB gr 3 EB gr 3 EB  EB Ms    ISTM UT   EB EB EW EB Ms EW   MFDc     EW EB MS EW   Neuro Re-Ed           scap squeezes                      Table slides           AAROM IR           RTC iso - flex, abd, ER, IR, ext  5" x 10  5" x 10         SL ER  30 30  2 x 10, 2# 2x10 2# 3 x 10, 2# 3 x 15 3# 3 x 15 3#   scap punch      2 x 10, 1# 2 x 10 3# 3x10 3#   PBALL YTI    10 x ea 10x ea 2 x 10 2 x10 1# 2x10 1#   TB LPD/Row    2 x 10, OTB 2 x 10, OTB 3 x 10, OTB TB Row 3 x 10  BTB  3x10   TB ER/IR      2 x 10, OTB 2 x 15 OTB  BTB  2x15   Bent over row      4# 2 x 10  4# 2 x 10  4# 3x10              Ther Ex           pulleys 5' 5' 5'  5' 5' 5' 5' 5'   Levator stretch           Upper trap stretching  10 x 10" 10 x 10" 10 x 10"   10 x 10" 10"x10   Finger ladder   10  10x 10x      [place] 20 ea 20 20 20 20      AROM to 90 30 ea 30  30 full 30 full 30 full 30, 1# 30, 1# 30, 1#   Cervical AROM    10 ea  10 ea MFDc                Ther Activity                                 Gait Training                                 Modalities

## 2022-11-14 ENCOUNTER — OFFICE VISIT (OUTPATIENT)
Dept: PHYSICAL THERAPY | Facility: OTHER | Age: 57
End: 2022-11-14

## 2022-11-14 DIAGNOSIS — M12.812 ROTATOR CUFF ARTHROPATHY OF LEFT SHOULDER: Primary | ICD-10-CM

## 2022-11-14 NOTE — PROGRESS NOTES
Daily Note     Today's date: 2022  Patient name: Chito Clark  : 1965  MRN: [id number]  Referring provider: Leatha George  Dx:   Encounter Diagnosis     ICD-10-CM    1  Rotator cuff arthropathy of left shoulder  M12 812              1:1 with PT from 3222-5271, not billed remainder    Subjective: Patient reports compliance with home program  States he also, added light bicep curls at home while sticking to his current lifting restrictions  At tis time his only complaint is return of neck tightness  Objective: See treatment diary below      Assessment:  Patient tolerated program without complaints  Progressed scap strengthening as charted below within current post-op protocol  Plan: Continue per plan of care  Continue to progress per protocol as tolerated          Precautions: L RTCR 2022 - protocol attached to chart, 10# lifting restriction    Manuals 9/26 10/10 10/14 10/24 10/28 10/31 11/04 11/7 11/14   L shoulder ROM EB EB EB EB EW EB MS EW EB   L GH mob - A-P, inferior EB, gr 3 EB gr 3 EB gr 3 EB  EB Ms  EB, gr 3/4   ISTM UT   EB EB EW EB Ms EW EB   MFDc     EW EB MS EW EB   Neuro Re-Ed            scap squeezes                        Table slides            AAROM IR            RTC iso - flex, abd, ER, IR, ext  5" x 10  5" x 10          SL ER  30 30  2 x 10, 2# 2x10 2# 3 x 10, 2# 3 x 15 3# 3 x 15 3# 3 x 10, 4#   scap punch      2 x 10, 1# 2 x 10 3# 3x10 3# 3 x 10, 4#   PBALL YTI    10 x ea 10x ea 2 x 10 2 x10 1# 2x10 1# 3 x 10, 1#   TB LPD/Row    2 x 10, OTB 2 x 10, OTB 3 x 10, OTB TB Row 3 x 10  BTB  3x10 BTB 3 x 10   TB ER/IR      2 x 10, OTB 2 x 15 OTB  BTB  2x15 BTB 2 x 15   Bent over row      4# 2 x 10  4# 2 x 10  4# 3x10 5# 3 x 10    Box walk         2 x 10 peach   Wall slide w/ ER iso         10 x 10" peach               Ther Ex            pulleys 5' 5' 5'  5' 5' 5' 5' 5' 5'    Levator stretch            Upper trap stretching  10 x 10" 10 x 10" 10 x 10"   10 x 10" 10"x10 10 x 10" w/ MFDc, 10 x 10" w/o   Finger ladder   10  10x 10x       [place] 20 ea 20 20 20 20       AROM to 90 30 ea 30  30 full 30 full 30 full 30, 1# 30, 1# 30, 1# 30, 1#   Cervical AROM    10 ea  10 ea MFDc   10 w/ MFDc, 10 w/o   pec stretch            Post capsule stretch            Sleeper stretch            Ther Activity                                    Gait Training                                    Modalities

## 2022-11-14 NOTE — PROGRESS NOTES
Daily Note     Today's date: 2022  Patient name: Josué Buckley  : 1965  MRN: [id number]  Referring provider: Patience Joshi*  Dx:   No diagnosis found  1:1 with PT for entirety    Subjective: Patient reports mild discomfort in neck is the only complaint he has  Objective: See treatment diary below      Assessment:  Patient tolerated program without complaints  Responds well to cupping  Continue to progress as tolerated  Plan: Continue per plan of care  Continue to progress per protocol as tolerated          Precautions: L RTCR 2022 - protocol attached to chart, 10# lifting restriction    Manuals 9/26 10/10 10/14 10/24 10/28 10/31 11/04 11/7   L shoulder ROM EB EB EB EB EW EB MS EW   L GH mob - A-P, inferior EB, gr 3 EB gr 3 EB gr 3 EB  EB Ms    ISTM UT   EB EB EW EB Ms EW   MFDc     EW EB MS EW   Neuro Re-Ed           scap squeezes                      Table slides           AAROM IR           RTC iso - flex, abd, ER, IR, ext  5" x 10  5" x 10         SL ER  30 30  2 x 10, 2# 2x10 2# 3 x 10, 2# 3 x 15 3# 3 x 15 3#   scap punch      2 x 10, 1# 2 x 10 3# 3x10 3#   PBALL YTI    10 x ea 10x ea 2 x 10 2 x10 1# 2x10 1#   TB LPD/Row    2 x 10, OTB 2 x 10, OTB 3 x 10, OTB TB Row 3 x 10  BTB  3x10   TB ER/IR      2 x 10, OTB 2 x 15 OTB  BTB  2x15   Bent over row      4# 2 x 10  4# 2 x 10  4# 3x10              Ther Ex           pulleys 5' 5' 5'  5' 5' 5' 5' 5'   Levator stretch           Upper trap stretching  10 x 10" 10 x 10" 10 x 10"   10 x 10" 10"x10   Finger ladder   10  10x 10x      [place] 20 ea 20 20 20 20      AROM to 90 30 ea 30  30 full 30 full 30 full 30, 1# 30, 1# 30, 1#   Cervical AROM    10 ea  10 ea MFDc                Ther Activity                                 Gait Training                                 Modalities

## 2022-11-18 ENCOUNTER — APPOINTMENT (OUTPATIENT)
Dept: PHYSICAL THERAPY | Facility: OTHER | Age: 57
End: 2022-11-18

## 2022-11-21 ENCOUNTER — OFFICE VISIT (OUTPATIENT)
Dept: PHYSICAL THERAPY | Facility: OTHER | Age: 57
End: 2022-11-21

## 2022-11-21 DIAGNOSIS — M75.102 TEAR OF LEFT SUPRASPINATUS TENDON: ICD-10-CM

## 2022-11-21 DIAGNOSIS — M12.812 ROTATOR CUFF ARTHROPATHY OF LEFT SHOULDER: Primary | ICD-10-CM

## 2022-11-21 NOTE — PROGRESS NOTES
Daily Note     Today's date: 2022  Patient name: Orlena Hamman  : 1965  MRN: [id number]  Referring provider: Kobe Bernard  Dx:   Encounter Diagnosis     ICD-10-CM    1  Rotator cuff arthropathy of left shoulder  M12 812       2  Tear of left supraspinatus tendon  M75 102                 1:1 with PT from 5819-2498, not billed remainder    Subjective: Patient reports improvements in upper trap tightness  Reports his shoulder is feeling good and he has been compliant with home program        Objective: See treatment diary below      Assessment:  Patient tolerated program without complaints  Patient has now reached final stage of rehab as on 21  Progressed program to include further stabilization  Also, discussed appropriate progression of strengthening over the next two weeks as he will not be able to attend PT over the next two weeks due to travel for work  Plan: Continue per plan of care  Continue to progress per protocol as tolerated          Precautions: L RTCR 2022 - protocol attached to chart    Manuals 10/10 10/14 10/24 10/28 10/31 11/04 11/7 11/14 11/21   L shoulder ROM EB EB EB EW EB MS EW EB    L GH mob - A-P, inferior EB gr 3 EB gr 3 EB  EB Ms  EB, gr 3/4    ISTM UT  EB EB EW EB Ms EW EB    MFDc    EW EB MS EW EB EB   Neuro Re-Ed            SL ER 30 30  2 x 10, 2# 2x10 2# 3 x 10, 2# 3 x 15 3# 3 x 15 3# 3 x 10, 4# 3 x 10, 5#   scap punch     2 x 10, 1# 2 x 10 3# 3x10 3# 3 x 10, 4# 3 x 10, 5#   PBALL YTI   10 x ea 10x ea 2 x 10 2 x10 1# 2x10 1# 3 x 10, 1# 3 x 10, 2#   TB LPD/Row   2 x 10, OTB 2 x 10, OTB 3 x 10, OTB TB Row 3 x 10  BTB  3x10 BTB 3 x 10 [place] 20# 2 x 10- row   TB ER/IR     2 x 10, OTB 2 x 15 OTB  BTB  2x15 BTB 2 x 15 [place], 5# 2 x 10   Bent over row     4# 2 x 10  4# 2 x 10  4# 3x10 5# 3 x 10  5# 3 x 10    Box walk        2 x 10 peach    Wall slide w/ ER iso        10 x 10" peach 2 x 10, 10" hold   Shoulder taps         Table 2 x 10    Table push up, 1/2 depth         10 x, w/ scp push up at top   Ther Ex            pulleys 5' 5'  5' 5' 5' 5' 5' 5'  5'   Levator stretch            Upper trap stretching 10 x 10" 10 x 10" 10 x 10"   10 x 10" 10"x10 10 x 10" w/ MFDc, 10 x 10" w/o 10 x 10" w/ MFDc, 10 x 10" w/o   Finger ladder  10  10x 10x        [place] 20 20 20 20        AROM to 90 30  30 full 30 full 30 full 30, 1# 30, 1# 30, 1# 30, 1# 30, 3#   Cervical AROM   10 ea  10 ea MFDc   10 w/ MFDc, 10 w/o 10 w/ MFDc, 10 w/o   pec stretch            Post capsule stretch            Sleeper stretch         10 x 10" stand   Ther Activity                                    Gait Training                                    Modalities

## 2022-11-25 ENCOUNTER — APPOINTMENT (OUTPATIENT)
Dept: PHYSICAL THERAPY | Facility: OTHER | Age: 57
End: 2022-11-25

## 2022-11-28 ENCOUNTER — APPOINTMENT (OUTPATIENT)
Dept: PHYSICAL THERAPY | Facility: OTHER | Age: 57
End: 2022-11-28

## 2022-12-02 ENCOUNTER — APPOINTMENT (OUTPATIENT)
Dept: PHYSICAL THERAPY | Facility: OTHER | Age: 57
End: 2022-12-02

## 2022-12-05 ENCOUNTER — OFFICE VISIT (OUTPATIENT)
Dept: OBGYN CLINIC | Facility: OTHER | Age: 57
End: 2022-12-05

## 2022-12-05 VITALS
SYSTOLIC BLOOD PRESSURE: 138 MMHG | WEIGHT: 220 LBS | HEART RATE: 59 BPM | BODY MASS INDEX: 31.57 KG/M2 | DIASTOLIC BLOOD PRESSURE: 78 MMHG

## 2022-12-05 DIAGNOSIS — M75.102 TEAR OF LEFT SUPRASPINATUS TENDON: Primary | ICD-10-CM

## 2022-12-05 NOTE — PROGRESS NOTES
Assessment  Diagnoses and all orders for this visit:    Tear of left supraspinatus tendon      Discussion and Plan:    Carlos's left shoulder is healthy on exam   Great motion and strength  He does have a fan deformity but is aware this will not affect his power  It is a cosmetic deformity and will remain the same  He is okay with that  He plans to return to the gym - light weight and high reps  He states he isn't interested in lifting heavy weight in gym any more  He can resume all activities to tolerance  Note for work - full release    Follow up: PRN    Subjective:   Patient ID: Adams Boxer is a 61 y o  male      Chiara Gonzalez presents in follow up of the left shoulder  He is 4 months from arthroscopic rotator cuff repair  He has been compliant with formal PT and his HEP  Denies new injury or trauma  He is very pleased with his progress and able to use the shoulder for all activities without pain or limitation  Feels as though he is ready to return to work without restrictions  He denies pain at night  Notes deformity of biceps and has questions about it  The following portions of the patient's history were reviewed and updated as appropriate: allergies, current medications, past family history, past medical history, past social history, past surgical history and problem list     Review of Systems   Constitutional: Negative for chills and fever  HENT: Negative for hearing loss  Eyes: Negative for visual disturbance  Respiratory: Negative for shortness of breath  Cardiovascular: Negative for chest pain  Gastrointestinal: Negative for abdominal pain  Musculoskeletal:        As reviewed in the HPI   Skin: Negative for rash  Neurological:        As reviewed in the HPI   Psychiatric/Behavioral: Negative for agitation  Objective:  /78   Pulse 59   Wt 99 8 kg (220 lb)   BMI 31 57 kg/m²       Left Shoulder Exam     Tenderness   The patient is experiencing no tenderness  Range of Motion   Passive abduction: normal   External rotation: normal   Forward flexion: normal     Muscle Strength   External rotation: 5/5   Supraspinatus: 5/5     Tests   Graf test: negative  Impingement: negative  Drop arm: negative    Other   Erythema: absent  Sensation: normal  Pulse: present     Comments:  Jose deformity            Physical Exam  Constitutional:       Appearance: He is well-developed and well-nourished  HENT:      Head: Normocephalic  Eyes:      Extraocular Movements: EOM normal    Pulmonary:      Effort: No respiratory distress  Breath sounds: No wheezing  Musculoskeletal:      Cervical back: Normal range of motion  Skin:     General: Skin is warm and dry  Neurological:      Mental Status: He is alert and oriented to person, place, and time  Psychiatric:         Mood and Affect: Mood and affect normal          Behavior: Behavior normal          Thought Content:  Thought content normal          Judgment: Judgment normal

## 2022-12-05 NOTE — LETTER
December 5, 2022     Patient: Angel Borjas  YOB: 1965  Date of Visit: 12/5/2022      To Whom it May Concern:    Lesvia Lynette is under my professional care  [facility] was seen in my office on 12/5/2022  [facility] may return to work on 12/5/2022 without any restrictions for the left arm  If you have any questions or concerns, please don't hesitate to call           Sincerely,          Joy Cochran PA-C        CC: No Recipients

## 2022-12-05 NOTE — LETTER
December 5, 2022     Patient: Fernando Ford  YOB: 1965  Date of Visit: 12/5/2022      To Whom it May Concern:    Francisco Castro is under my professional care  Thierry Key was seen in my office on 12/5/2022  Thierry Key may return to work on 12/6/2022 without any restrictions for left shoulder  If you have any questions or concerns, please don't hesitate to call           Sincerely,          Ana Lagos PA-C        CC: No Recipients

## 2022-12-16 ENCOUNTER — APPOINTMENT (OUTPATIENT)
Dept: PHYSICAL THERAPY | Facility: OTHER | Age: 57
End: 2022-12-16

## 2022-12-19 ENCOUNTER — OFFICE VISIT (OUTPATIENT)
Dept: PHYSICAL THERAPY | Facility: OTHER | Age: 57
End: 2022-12-19

## 2022-12-19 DIAGNOSIS — M12.812 ROTATOR CUFF ARTHROPATHY OF LEFT SHOULDER: Primary | ICD-10-CM

## 2022-12-19 DIAGNOSIS — M75.102 TEAR OF LEFT SUPRASPINATUS TENDON: ICD-10-CM

## 2022-12-19 NOTE — PROGRESS NOTES
Daily Note     Today's date: 2022  Patient name: Jenny Moralez  : 1965  MRN: [id number]  Referring provider: Baldev Elam  Dx:   Encounter Diagnosis     ICD-10-CM    1  Rotator cuff arthropathy of left shoulder  M12 812       2  Tear of left supraspinatus tendon  M75 102           Start Time: 1400  Stop Time: 4100  1:1 with PT for entirety    Subjective: Patient reports follow up with MD and receiving full clearance for return to work  States that his shoulder is feeling great and has no complaints  Reports he also discussed a gradual progression of overhead lifting and resistance training with the physician and plans to progress this slowly after the new year  Objective: See treatment diary below      Assessment:  Patient tolerated program without complaints  Resmued cupping to decrease myofascial tightness of upper trap  Patient continues to intermittently have upper trap tightness and headaches  Discussed progression of strength  Patient has full strength of RTC on MMT  Patient with full pain free shoulder ROM  Answered all patient questions  Patient has met all functional goals and is d/c to HEP at this time       Plan: d/c to HEP       Precautions: L RTCR 2022 - protocol attached to chart    Manuals 10/10 10/14 10/24 10/28 10/31 11/04 11/7 11/14 11/21 12/19   L shoulder ROM EB EB EB EW EB MS EW EB     L GH mob - A-P, inferior EB gr 3 EB gr 3 EB  EB Ms  EB, gr 3/4     ISTM UT  EB EB EW EB Ms EW EB  EB   MFDc    EW EB MS EW EB EB EB   Neuro Re-Ed             SL ER 30 30  2 x 10, 2# 2x10 2# 3 x 10, 2# 3 x 15 3# 3 x 15 3# 3 x 10, 4# 3 x 10, 5#    scap punch     2 x 10, 1# 2 x 10 3# 3x10 3# 3 x 10, 4# 3 x 10, 5#    PBALL YTI   10 x ea 10x ea 2 x 10 2 x10 1# 2x10 1# 3 x 10, 1# 3 x 10, 2#    TB LPD/Row   2 x 10, OTB 2 x 10, OTB 3 x 10, OTB TB Row 3 x 10  BTB  3x10 BTB 3 x 10 [place] 20# 2 x 10- row    TB ER/IR     2 x 10, OTB 2 x 15 OTB  BTB  2x15 BTB 2 x 15 [place], 5# 2 x 10 Bent over row     4# 2 x 10  4# 2 x 10  4# 3x10 5# 3 x 10  5# 3 x 10     Box walk        2 x 10 [place]     Wall slide w/ ER iso        10 x 10" [place] 2 x 10, 10" hold    Shoulder taps         Table 2 x 10     Table push up, 1/2 depth         10 x, w/ scp push up at top    Ther Ex             pulleys 5' 5'  5' 5' 5' 5' 5' 5'  5'    Levator stretch             Upper trap stretching 10 x 10" 10 x 10" 10 x 10"   10 x 10" 10"x10 10 x 10" w/ MFDc, 10 x 10" w/o 10 x 10" w/ MFDc, 10 x 10" w/o    Finger ladder  10  10x 10x         [place] 20 20 20 20         AROM to 90 30  30 full 30 full 30 full 30, 1# 30, 1# 30, 1# 30, 1# 30, 3#    Cervical AROM   10 ea  10 ea MFDc   10 w/ MFDc, 10 w/o 10 w/ MFDc, 10 w/o    pec stretch             Post capsule stretch             Sleeper stretch         10 x 10" stand    Ther Activity                                       Gait Training                                       Modalities

## 2023-02-06 ENCOUNTER — OFFICE VISIT (OUTPATIENT)
Dept: FAMILY MEDICINE CLINIC | Facility: CLINIC | Age: 58
End: 2023-02-06

## 2023-02-06 VITALS
RESPIRATION RATE: 16 BRPM | SYSTOLIC BLOOD PRESSURE: 120 MMHG | DIASTOLIC BLOOD PRESSURE: 80 MMHG | HEIGHT: 70 IN | OXYGEN SATURATION: 97 % | TEMPERATURE: 97 F | WEIGHT: 228.8 LBS | BODY MASS INDEX: 32.75 KG/M2 | HEART RATE: 56 BPM

## 2023-02-06 DIAGNOSIS — K62.5 BRBPR (BRIGHT RED BLOOD PER RECTUM): Primary | ICD-10-CM

## 2023-02-06 RX ORDER — AMPICILLIN TRIHYDRATE 250 MG
50 CAPSULE ORAL
COMMUNITY

## 2023-02-06 RX ORDER — ASPIRIN 81 MG/1
81 TABLET ORAL DAILY
COMMUNITY

## 2023-02-06 NOTE — PROGRESS NOTES
FAMILY PRACTICE OFFICE VISIT       NAME: Jenny Moralez  AGE: 61 y o  SEX: male       : 1965        MRN: [id number]    DATE: 2023  TIME: 4:54 PM    Assessment and Plan   1  BRBPR (bright red blood per rectum)  -     Ambulatory Referral to Colorectal Surgery; Future                 Chief Complaint     Chief Complaint   Patient presents with   • same day sick     Started on off bleeding from rectum       History of Present [id number] [address] Ochoa is a 61y o -year-old male who is here for c/o rectal bleeding  Had bowel movement, pushed hard, no blood until after 3rd time having blood in toilet  May have dripped in toilet  No blood in stool per patient  Blood on toilet tissue  Bright red blood  Never had hemorroid that he knows  Has been exercising, now back to weight lifting in January  Since starting to exercise has been having blooding stool  Has been having bloating in the abdomen  Does not happen with every stool  No weight loss or night sweats      Review of Systems   Review of Systems   Constitutional: Negative for fatigue, fever and unexpected weight change  Respiratory: Negative for cough, shortness of breath and wheezing  Cardiovascular: Negative for chest pain and palpitations  Gastrointestinal: Positive for abdominal distention and anal bleeding  Negative for abdominal pain, blood in stool, constipation, diarrhea, nausea, rectal pain and vomiting  Genitourinary: Negative for dysuria and frequency  Musculoskeletal: Negative for arthralgias and myalgias  Skin: Negative for rash  Neurological: Negative for dizziness, light-headedness and headaches  Hematological: Negative for adenopathy  Psychiatric/Behavioral: Negative for decreased concentration and sleep disturbance  The patient is not nervous/anxious          Active Problem List     Patient Active Problem List   Diagnosis   • Abnormal glucose   • Anxiety disorder   • GERD (gastroesophageal reflux disease)   • Hyperlipidemia • Hypothyroidism   • Insomnia   • Seborrheic keratosis   • Umbilical hernia   • Well adult exam   • Screening for cardiovascular condition   • Screening for diabetes mellitus   • Screening for colon cancer   • Screening for prostate cancer   • Muscle spasm   • Impingement syndrome of left shoulder   • Radiculopathy, cervical region   • Internal derangement of shoulder, left   • Tear of left supraspinatus tendon   • Pre-op examination   • Aftercare following surgery of the musculoskeletal system         Past Medical History:  Past Medical History:   Diagnosis Date   • GERD (gastroesophageal reflux disease)    • Pulmonary embolism ([address] )     last assessed 1/10/2013   • Snoring 2016       Past Surgical History:  Past Surgical History:   Procedure Laterality Date   • [place] SURGICAL ARTHROSCOPY SHOULDER W/ROTATOR CUFF RPR Left 2022    Procedure: SHOULDER ARTHROSCOPIC ROTATOR CUFF REPAIR BICEPS TENODISIS;  Surgeon: Marycarmen Enriquez MD;  Location: [facility] OR;  Service: Orthopedics   • TONSILLECTOMY AND ADENOIDECTOMY         Family History:  Family History   Problem Relation Age of Onset   • Diabetes Mother         DM   • Hypothyroidism Mother    • Glaucoma Mother    • Hypertension Mother    • Alcohol abuse Sister    • Drug abuse Sister    • Hypothyroidism Sister    • Lung cancer Maternal Grandmother        Social History:  Social History     Socioeconomic History   • Marital status: /Civil Union     Spouse name: Not on file   • Number of children: 3   • Years of education: Not on file   • Highest education level: Not on file   Occupational History   • Occupation:      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    Tobacco Use   • Smoking status: Former     Types: Cigarettes     Quit date:      Years since quittin 1   • Smokeless tobacco: Never   Vaping Use   • Vaping Use: Some days   • Substances: THC, Flavoring   Substance and Sexual Activity   • Alcohol use:  Yes Comment: occ   • Drug use: No   • Sexual activity: Yes     Partners: Female   Other Topics Concern   • Not on file   Social History Narrative    Recreational activities martial arts      Social Determinants of Health     Financial Resource Strain: Not on file   Food Insecurity: Not on file   Transportation Needs: Not on file   Physical Activity: Not on file   Stress: Not on file   Social Connections: Not on file   Intimate Partner Violence: Not on file   Housing Stability: Not on file       Objective     Vitals:    02/06/23 1534   BP: 120/80   Pulse: 56   Resp: 16   Temp: (!) 97 °F (36 1 °C)   SpO2: 97%     Wt Readings from Last 3 Encounters:   02/06/23 104 kg (228 lb 12 8 oz)   12/05/22 99 8 kg (220 lb)   10/03/22 103 kg (226 lb 3 2 oz)       Physical Exam  Vitals and nursing note reviewed  Constitutional:       Appearance: Normal appearance  HENT:      Head: Normocephalic and atraumatic  Eyes:      Conjunctiva/sclera: Conjunctivae normal    Cardiovascular:      Rate and Rhythm: Normal rate and regular rhythm  Heart sounds: Normal heart sounds  Abdominal:      General: Abdomen is flat  Bowel sounds are normal       Palpations: Abdomen is soft  Comments: ZEB: heme negative  No hemorrhoids  Rectum wnl     Musculoskeletal:         General: Normal range of motion  Cervical back: Normal range of motion  Skin:     General: Skin is warm and dry  Neurological:      Mental Status: He is alert and oriented to person, place, and time     Psychiatric:         Mood and Affect: Mood normal          Behavior: Behavior normal          Pertinent Laboratory/Diagnostic Studies:  Lab Results   Component Value Date    GLUCOSE 112 04/13/2015    BUN 21 08/10/2022    CREATININE 1 08 08/10/2022    CALCIUM 9 0 08/10/2022     04/13/2015    K 4 5 08/10/2022    CO2 28 08/10/2022     08/10/2022     Lab Results   Component Value Date    ALT 77 08/10/2022    AST 41 08/10/2022    ALKPHOS 58 08/10/2022 BILITOT 0 45 04/13/2015       Lab Results   Component Value Date    WBC 4 83 08/21/2019    HGB 16 2 08/21/2019    HCT 49 9 (H) 08/21/2019    MCV 85 08/21/2019     08/21/2019       No results found for: TSH    Lab Results   Component Value Date    CHOL 180 04/13/2015     Lab Results   Component Value Date    TRIG 162 (H) 08/10/2022     Lab Results   Component Value Date    HDL 24 (L) 08/10/2022     Lab Results   Component Value Date    LDLCALC 99 08/10/2022     Lab Results   Component Value Date    HGBA1C 5 4 08/10/2022       Results for orders placed or performed in visit on 08/10/22   HIV 1/2 Antigen/Antibody (4th Generation) w Reflex [facility]   Result Value Ref Range    HIV-1/HIV-2 Ab Non-Reactive Non-Reactive   Comprehensive metabolic panel   Result Value Ref Range    Sodium 139 135 - 147 mmol/L    Potassium 4 5 3 5 - 5 3 mmol/L    Chloride 107 96 - 108 mmol/L    CO2 28 21 - 32 mmol/L    ANION GAP 4 4 - 13 mmol/L    BUN 21 5 - 25 mg/dL    Creatinine 1 08 0 60 - 1 30 mg/dL    Glucose, Fasting 93 65 - 99 mg/dL    Calcium 9 0 8 3 - 10 1 mg/dL    Corrected Calcium 9 6 8 3 - 10 1 mg/dL    AST 41 5 - 45 U/L    ALT 77 12 - 78 U/L    Alkaline Phosphatase 58 46 - 116 U/L    Total Protein 7 3 6 4 - 8 4 g/dL    Albumin 3 3 (L) 3 5 - 5 0 g/dL    Total Bilirubin 0 62 0 20 - 1 00 mg/dL    eGFR 75 ml/min/1 73sq m   Hemoglobin A1C   Result Value Ref Range    Hemoglobin A1C 5 4 Normal 3 8-5 6%; PreDiabetic 5 7-6 4%;  Diabetic >=6 5%; Glycemic control for adults with diabetes <7 0% %     mg/dl   Lipid Panel with Direct LDL reflex   Result Value Ref Range    Cholesterol 155 See Comment mg/dL    Triglycerides 162 (H) See Comment mg/dL    HDL, Direct 24 (L) >=40 mg/dL    LDL Calculated 99 0 - 100 mg/dL   TSH, 3rd generation with Free T4 reflex   Result Value Ref Range    TSH 3RD GENERATON 1 170 0 450 - 4 500 uIU/mL       Orders Placed This Encounter   Procedures   • Ambulatory Referral to Colorectal Surgery ALLERGIES:  Allergies   Allergen Reactions   • Esomeprazole        Current Medications     Current Outpatient Medications   Medication Sig Dispense Refill   • ALPRAZolam (XANAX) 0 25 mg tablet TAKE 1 1/2 TABS BY MOUTH AT BEDTIME 45 tablet 5   • aspirin (ECOTRIN LOW STRENGTH) 81 mg EC tablet Take 81 mg by mouth daily     • Co Q-10 50 MG Take 50 mg by mouth     • levothyroxine 112 mcg tablet Take 1 tablet (112 mcg total) by mouth daily in the early morning 90 tablet 3   • Multiple Vitamins-Minerals (CENTRUM SILVER 50+MEN PO) Take 1 tablet by mouth daily     • omeprazole (PriLOSEC) 40 MG capsule TAKE 1 CAPSULE BY MOUTH DAILY 90 capsule 0   • rosuvastatin (CRESTOR) 20 MG tablet TAKE 1 TABLET BY MOUTH DAILY 90 tablet 3   • cyclobenzaprine (FLEXERIL) 10 mg tablet Take 1 tablet (10 mg total) by mouth 3 (three) times a day as needed for muscle spasms (Patient not taking: Reported on 2/6/2023) 30 tablet 0     No current facility-administered medications for this visit           Health Maintenance     Health Maintenance   Topic Date Due   • COVID-19 Vaccine (4 - Booster for Pfizer series) 01/08/2022   • Annual Physical  08/24/2022   • PT PLAN OF CARE  08/25/2022   • Influenza Vaccine (1) 09/01/2022   • Depression Screening  03/09/2023   • BMI: Followup Plan  03/09/2023   • BMI: Adult  02/06/2024   • DTaP,Tdap,and Td Vaccines (3 - Td or Tdap) 03/12/2024   • Colorectal Cancer Screening  12/18/2024   • HIV Screening  Completed   • Hepatitis C Screening  Completed   • Pneumococcal Vaccine: Pediatrics (0 to 5 Years) and At-Risk Patients (6 to 59 Years)  Aged Out   • HIB Vaccine  Aged Out   • IPV Vaccine  Aged Out   • Hepatitis A Vaccine  Aged Out   • Meningococcal ACWY Vaccine  Aged Out   • HPV Vaccine  Aged [employer] History   Administered Date(s) Administered   • COVID-19 PFIZER VACCINE 0 3 ML IM 03/15/2021, 04/05/2021, 11/13/2021   • INFLUENZA 10/31/2014, 01/23/2017, 12/04/2017   • Influenza Quadrivalent Preservative Free 3 years and older IM 10/30/2015, 01/23/2017, 12/04/2017   • Influenza, injectable, quadrivalent, preservative free 0 5 mL 10/06/2018   • Influenza, recombinant, quadrivalent,injectable, preservative free 10/19/2019, 10/21/2020, 10/16/2021   • Influenza, seasonal, injectable 12/12/2013   • Tdap 09/27/2010, 03/12/2014     BMI Counseling: Body mass index is 32 83 kg/m²  The BMI is above normal  Nutrition recommendations include decreasing portion sizes, encouraging healthy choices of fruits and vegetables, decreasing fast food intake, consuming healthier snacks, limiting drinks that contain sugar, moderation in carbohydrate intake, increasing intake of lean protein, reducing intake of saturated and trans fat and reducing intake of cholesterol  Exercise recommendations include moderate physical activity 150 minutes/week, exercising 3-5 times per week and strength training exercises  No pharmacotherapy was ordered  Rationale for BMI follow-up plan is due to patient being overweight or obese           MISSY Goldstein

## 2023-03-09 NOTE — PROGRESS NOTES
Colon and Rectal [address] [employer] 60 y o  male MRN [id number]  Encounter: [id number]  03/10/23 8:58 AM            Assessment: Shaw Benavidez is a 60 y o  male who had transient rectal bleeding  Plan:   BRBPR (bright red blood per rectum)  He has had recent bleeding that lasted about a week  Since 8 days ago, he has had no further bleeding  The blood was bright red and occurred only with bowel movements  He noticed some possible prolapsing hemorrhoid but had no other anorectal symptoms  Examination and anoscopy are unremarkable  His symptoms were transient and have ceased  I do not recommend any other evaluation at this time  He had a colonoscopy in 2019 that showed a single polyp  He is to return should he have recurrent bleeding, in 6 weeks  A high-fiber diet was recommended  Metamucil is recommended daily  Subjective     HPI    Shaw Benavidez is a 60 y o  male who is referred today by Pablito Goyal for evaluation of bright red blood per rectum  The patient notes his on and off bright red rectal bleeding with bowel movements, noticeable in the toilet started in January, after an episode of straining; he has not had any bleeding for the past 8 days and denies any rectal pain, anal protuberances, itching or burming  The patient is currently having 3 soft and formed bowel movements per day    His most recent colonoscopy/EGD on 12/18/2019 with Dr Krista Galdamez showed: 1  Single polyp  2  Mild left-sided diverticulosis  3  Small internal hemorrhoids  5 year colonoscopy recall  D  Colon, cold forcep random colon biopsy:  -Benign colonic mucosa with intramucosal lymphoid follicle  -No evidence of active colitis   -No evidence of lymphocytic or collagenous colitis   -No evidence of dysplasia or malignancy seen         E  Polyp, Colorectal, cold forcep rectal polypectomy:  -Hyperplastic polyps  -No evidence of adenomatous change or malignancy      Historical Information Past Medical History:   Diagnosis Date   • GERD (gastroesophageal reflux disease)    • Pulmonary embolism (HCC)     last assessed 1/10/2013   • Snoring 2016     Past Surgical History:   Procedure Laterality Date   • [place] SURGICAL ARTHROSCOPY SHOULDER W/ROTATOR CUFF RPR Left 2022    Procedure: SHOULDER ARTHROSCOPIC ROTATOR CUFF REPAIR BICEPS TENODISIS;  Surgeon: Dwaine Montiel MD;  Location: [facility] MA[facility] OR;  Service: Orthopedics   • TONSILLECTOMY AND ADENOIDECTOMY         Meds/Allergies       Current Outpatient Medications:   •  ALPRAZolam (XANAX) 0 25 mg tablet, TAKE 1 1/2 TABS BY MOUTH AT BEDTIME, Disp: 45 tablet, Rfl: 5  •  aspirin (ECOTRIN LOW STRENGTH) 81 mg EC tablet, Take 81 mg by mouth daily, Disp: , Rfl:   •  Co Q-10 50 MG, Take 50 mg by mouth, Disp: , Rfl:   •  levothyroxine 112 mcg tablet, Take 1 tablet (112 mcg total) by mouth daily in the early morning, Disp: 90 tablet, Rfl: 3  •  Multiple Vitamins-Minerals (CENTRUM SILVER 50+MEN PO), Take 1 tablet by mouth daily, Disp: , Rfl:   •  omeprazole (PriLOSEC) 40 MG capsule, TAKE 1 CAPSULE BY MOUTH DAILY, Disp: 90 capsule, Rfl: 0  •  rosuvastatin (CRESTOR) 20 MG tablet, TAKE 1 TABLET BY MOUTH DAILY, Disp: 90 tablet, Rfl: 3  •  cyclobenzaprine (FLEXERIL) 10 mg tablet, Take 1 tablet (10 mg total) by mouth 3 (three) times a day as needed for muscle spasms (Patient not taking: Reported on 2023), Disp: 30 tablet, Rfl: 0  Allergies   Allergen Reactions   • Esomeprazole        Social History   Social History     Substance and Sexual Activity   Drug Use No     Social History     Tobacco Use   Smoking Status Former   • Types: Cigarettes   • Quit date:    • Years since quittin 1   Smokeless Tobacco Never         Family History   Problem Relation Age of Onset   • Diabetes Mother         DM   • Hypothyroidism Mother    • Glaucoma Mother    • Hypertension Mother    • Alcohol abuse Sister    • Drug abuse Sister    • Hypothyroidism Sister    • Lung cancer Maternal Grandmother          Review of Systems   Constitutional: Negative  Gastrointestinal: Positive for anal bleeding  Objective   Current Vitals:  Vitals:    03/10/23 0831   Weight: 103 kg (226 lb)   Height: 5' 10" (1 778 m)         Physical Exam  Constitutional:       Appearance: Normal appearance  Genitourinary:     Prostate: Normal       Rectum: Normal    Neurological:      Mental Status: He is alert

## 2023-03-10 ENCOUNTER — OFFICE VISIT (OUTPATIENT)
Age: 58
End: 2023-03-10

## 2023-03-10 VITALS — WEIGHT: 226 LBS | BODY MASS INDEX: 32.35 KG/M2 | HEIGHT: 70 IN

## 2023-03-10 DIAGNOSIS — R12 HEARTBURN: ICD-10-CM

## 2023-03-10 DIAGNOSIS — K62.5 BRBPR (BRIGHT RED BLOOD PER RECTUM): Primary | ICD-10-CM

## 2023-03-10 RX ORDER — OMEPRAZOLE 40 MG/1
CAPSULE, DELAYED RELEASE ORAL
Qty: 90 CAPSULE | Refills: 0 | Status: SHIPPED | OUTPATIENT
Start: 2023-03-10

## 2023-03-10 NOTE — PROGRESS NOTES
Lower Endoscopy    Date/Time: 3/10/2023 8:53 AM  Performed by: Jossy Carrera MD  Authorized by: Jossy Carrera MD     Verbal consent obtained?: Yes    Consent given by:  Patient  Scope type: Anoscope   The anus and anal rectal tissues are unremarkable  Digital rectal exam reveals no lesions

## 2023-03-10 NOTE — ASSESSMENT & PLAN NOTE
He has had recent bleeding that lasted about a week  Since 8 days ago, he has had no further bleeding  The blood was bright red and occurred only with bowel movements  He noticed some possible prolapsing hemorrhoid but had no other anorectal symptoms  Examination and anoscopy are unremarkable  His symptoms were transient and have ceased  I do not recommend any other evaluation at this time  He had a colonoscopy in 2019 that showed a single polyp  He is to return should he have recurrent bleeding, in 6 weeks  A high-fiber diet was recommended  Metamucil is recommended daily

## 2023-03-13 DIAGNOSIS — F41.9 ANXIETY: ICD-10-CM

## 2023-03-13 RX ORDER — ALPRAZOLAM 0.25 MG/1
TABLET ORAL
Qty: 45 TABLET | Refills: 5 | Status: SHIPPED | OUTPATIENT
Start: 2023-03-13

## 2023-04-24 ENCOUNTER — OFFICE VISIT (OUTPATIENT)
Dept: FAMILY MEDICINE CLINIC | Facility: CLINIC | Age: 58
End: 2023-04-24

## 2023-04-24 VITALS
SYSTOLIC BLOOD PRESSURE: 110 MMHG | HEIGHT: 70 IN | HEART RATE: 68 BPM | OXYGEN SATURATION: 97 % | WEIGHT: 229.4 LBS | TEMPERATURE: 98.3 F | DIASTOLIC BLOOD PRESSURE: 80 MMHG | BODY MASS INDEX: 32.84 KG/M2 | RESPIRATION RATE: 13 BRPM

## 2023-04-24 DIAGNOSIS — E78.2 MIXED HYPERLIPIDEMIA: ICD-10-CM

## 2023-04-24 DIAGNOSIS — Z00.00 ANNUAL PHYSICAL EXAM: Primary | ICD-10-CM

## 2023-04-24 DIAGNOSIS — M54.12 RADICULOPATHY, CERVICAL REGION: ICD-10-CM

## 2023-04-24 DIAGNOSIS — Z13.1 SCREENING FOR DIABETES MELLITUS: ICD-10-CM

## 2023-04-24 DIAGNOSIS — Z12.5 SCREENING FOR PROSTATE CANCER: ICD-10-CM

## 2023-04-24 DIAGNOSIS — E03.9 ACQUIRED HYPOTHYROIDISM: ICD-10-CM

## 2023-04-24 PROBLEM — K62.5 BRBPR (BRIGHT RED BLOOD PER RECTUM): Status: RESOLVED | Noted: 2023-03-10 | Resolved: 2023-04-24

## 2023-04-24 PROBLEM — Z01.818 PRE-OP EXAMINATION: Status: RESOLVED | Noted: 2022-07-18 | Resolved: 2023-04-24

## 2023-04-24 NOTE — PROGRESS NOTES
ADULT ANNUAL [address]    NAME: Yuri Bond  AGE: 60 y o  SEX: male  : 1965     DATE: 2023     Assessment and Plan:     Problem List Items Addressed This Visit        Endocrine    Hypothyroidism    Relevant Orders    TSH, 3rd generation with Free T4 reflex       Nervous and Auditory    Radiculopathy, cervical region    Relevant Orders    Ambulatory Referral to Physical Therapy       Other    Hyperlipidemia    Relevant Orders    CBC    Comprehensive metabolic panel    Lipid Panel with Direct LDL reflex    Screening for diabetes mellitus    Relevant Orders    Hemoglobin A1C    Screening for prostate cancer    Relevant Orders    PSA, Total Screen    Annual physical exam - Primary       Immunizations and preventive care screenings were discussed with patient today  Appropriate education was printed on patient's after visit summary  Discussed risks and benefits of prostate cancer screening  We discussed the controversial history of PSA screening for prostate cancer in the [place] as well as the risk of over detection and over treatment of prostate cancer by way of PSA screening  The patient understands that PSA blood testing is an imperfect way to screen for prostate cancer and that elevated PSA levels in the blood may also be caused by infection, inflammation, prostatic trauma or manipulation, urological procedures, or by benign prostatic enlargement  Counseling:  Dental Health: discussed importance of regular tooth brushing, flossing, and dental visits  Depression Screening and Follow-up Plan: Patient was screened for depression during today's encounter  They screened negative with a PHQ-2 score of 0  Return in 6 months (on 10/24/2023) for Recheck       Chief Complaint:     Chief Complaint   Patient presents with   • Annual Exam     Patient here for annual wellness exam       History of Present Illness: Adult Annual Physical   Patient here for a comprehensive physical exam  The patient reports problems - muscle spasm with exercises since surgery  Diet and Physical Activity  Diet/Nutrition: well balanced diet  Exercise: 3-4 times a week on average  Depression Screening  PHQ-2/9 Depression Screening    Little interest or pleasure in doing things: 0 - not at all  Feeling down, depressed, or hopeless: 0 - not at all  PHQ-2 Score: 0  PHQ-2 Interpretation: Negative depression screen       General Health  Sleep: sleeps well  Hearing: normal - bilateral   Vision: no vision problems  Dental: brushes teeth twice daily   Health  Symptoms include: none     Review of Systems:     Review of Systems   Constitutional: Negative  HENT: Negative  Eyes: Negative  Respiratory: Negative  Cardiovascular: Negative  Gastrointestinal: Negative  Endocrine: Negative  Genitourinary: Negative  Musculoskeletal: Positive for neck pain  Allergic/Immunologic: Negative  Neurological: Positive for numbness  Hematological: Negative  Psychiatric/Behavioral: Negative         Past Medical History:     Past Medical History:   Diagnosis Date   • GERD (gastroesophageal reflux disease)    • Pulmonary embolism (HCC)     last assessed 1/10/2013   • Snoring 4/25/2016      Past Surgical History:     Past Surgical History:   Procedure Laterality Date   • [place] SURGICAL ARTHROSCOPY SHOULDER W/ROTATOR CUFF RPR Left 7/29/2022    Procedure: SHOULDER ARTHROSCOPIC ROTATOR CUFF REPAIR BICEPS TENODISIS;  Surgeon: Cee Ramos MD;  [facility];  Service: Orthopedics   • TONSILLECTOMY AND ADENOIDECTOMY        Family History:     Family History   Problem Relation Age of Onset   • Diabetes Mother         DM   • Hypothyroidism Mother    • Glaucoma Mother    • Hypertension Mother    • Alcohol abuse Sister    • Drug abuse Sister    • Hypothyroidism Sister    • Lung cancer Maternal Grandmother Social History:     Social History     Socioeconomic History   • Marital status: /Civil Union     Spouse name: None   • Number of children: 3   • Years of education: None   • Highest education level: None   Occupational History   • Occupation:      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    Tobacco Use   • Smoking status: Former     Types: Cigarettes     Quit date:      Years since quittin 3   • Smokeless tobacco: Never   Vaping Use   • Vaping Use: Some days   • Substances: THC, Flavoring   Substance and Sexual Activity   • Alcohol use: Yes     Comment: occ   • Drug use: No   • Sexual activity: Yes     Partners: Female   Other Topics Concern   • None   Social History Narrative    Recreational activities martial arts      Social Determinants of Health     Financial Resource Strain: Not on file   Food Insecurity: Not on file   Transportation Needs: Not on file   Physical Activity: Not on file   Stress: Not on file   Social Connections: Not on file   Intimate Partner Violence: Not on file   Housing Stability: Not on file      Current Medications:     Current Outpatient Medications   Medication Sig Dispense Refill   • ALPRAZolam (XANAX) 0 25 mg tablet Take 1 1/2 Tablet By Mouth At Bedtime 45 tablet 5   • aspirin (ECOTRIN LOW STRENGTH) 81 mg EC tablet Take 81 mg by mouth daily     • Co Q-10 50 MG Take 50 mg by mouth     • levothyroxine 112 mcg tablet Take 1 tablet (112 mcg total) by mouth daily in the early morning 90 tablet 3   • Multiple Vitamins-Minerals (CENTRUM SILVER 50+MEN PO) Take 1 tablet by mouth daily     • omeprazole (PriLOSEC) 40 MG capsule TAKE 1 CAPSULE BY MOUTH DAILY 90 capsule 0   • rosuvastatin (CRESTOR) 20 MG tablet TAKE 1 TABLET BY MOUTH DAILY 90 tablet 3     No current facility-administered medications for this visit  Allergies:      Allergies   Allergen Reactions   • Esomeprazole       Physical Exam:     /80 (BP Location: Left arm, Patient "Position: Sitting, Cuff Size: Large)   Pulse 68   Temp 98 3 °F (36 8 °C) (Tympanic)   Resp 13   Ht 5' 10\" (1 778 m)   Wt 104 kg (229 lb 6 4 oz)   SpO2 97%   BMI 32 92 kg/m²     Physical Exam  Vitals and nursing note reviewed  Constitutional:       Appearance: He is well-developed  HENT:      Head: Normocephalic and atraumatic  Right Ear: External ear normal       Left Ear: External ear normal       Nose: Nose normal    Eyes:      Conjunctiva/sclera: Conjunctivae normal       Pupils: Pupils are equal, round, and reactive to light  Cardiovascular:      Rate and Rhythm: Normal rate and regular rhythm  Heart sounds: Normal heart sounds  Pulmonary:      Effort: Pulmonary effort is normal       Breath sounds: Normal breath sounds  Abdominal:      General: Bowel sounds are normal       Palpations: Abdomen is soft  Musculoskeletal:         General: Normal range of motion  Cervical back: Normal range of motion and neck supple  Skin:     General: Skin is warm and dry  Capillary Refill: Capillary refill takes less than 2 seconds  Neurological:      Mental Status: He is alert and oriented to person, place, and time  Psychiatric:         Behavior: Behavior normal          Thought Content:  Thought content normal          Judgment: Judgment normal           Julián Moseley DO  [id number] [facility]  "

## 2023-04-27 ENCOUNTER — APPOINTMENT (OUTPATIENT)
Dept: LAB | Age: 58
End: 2023-04-27

## 2023-04-27 DIAGNOSIS — Z13.1 SCREENING FOR DIABETES MELLITUS: ICD-10-CM

## 2023-04-27 DIAGNOSIS — E78.2 MIXED HYPERLIPIDEMIA: ICD-10-CM

## 2023-04-27 DIAGNOSIS — E03.9 ACQUIRED HYPOTHYROIDISM: ICD-10-CM

## 2023-04-27 DIAGNOSIS — Z00.8 HEALTH EXAMINATION IN POPULATION SURVEY: ICD-10-CM

## 2023-04-27 DIAGNOSIS — Z12.5 SCREENING FOR PROSTATE CANCER: ICD-10-CM

## 2023-04-27 LAB
ALBUMIN SERPL BCP-MCNC: 3.7 G/DL (ref 3.5–5)
ALP SERPL-CCNC: 51 U/L (ref 46–116)
ALT SERPL W P-5'-P-CCNC: 74 U/L (ref 12–78)
ANION GAP SERPL CALCULATED.3IONS-SCNC: 1 MMOL/L (ref 4–13)
AST SERPL W P-5'-P-CCNC: 48 U/L (ref 5–45)
BILIRUB SERPL-MCNC: 0.55 MG/DL (ref 0.2–1)
BUN SERPL-MCNC: 23 MG/DL (ref 5–25)
CALCIUM SERPL-MCNC: 9.4 MG/DL (ref 8.3–10.1)
CHLORIDE SERPL-SCNC: 106 MMOL/L (ref 96–108)
CHOLEST SERPL-MCNC: 117 MG/DL
CO2 SERPL-SCNC: 28 MMOL/L (ref 21–32)
CREAT SERPL-MCNC: 1.08 MG/DL (ref 0.6–1.3)
ERYTHROCYTE [DISTWIDTH] IN BLOOD BY AUTOMATED COUNT: 14.2 % (ref 11.6–15.1)
GFR SERPL CREATININE-BSD FRML MDRD: 74 ML/MIN/1.73SQ M
GLUCOSE P FAST SERPL-MCNC: 93 MG/DL (ref 65–99)
HCT VFR BLD AUTO: 51.3 % (ref 36.5–49.3)
HDLC SERPL-MCNC: 30 MG/DL
HGB BLD-MCNC: 16.2 G/DL (ref 12–17)
LDLC SERPL CALC-MCNC: 64 MG/DL (ref 0–100)
MCH RBC QN AUTO: 28 PG (ref 26.8–34.3)
MCHC RBC AUTO-ENTMCNC: 31.6 G/DL (ref 31.4–37.4)
MCV RBC AUTO: 89 FL (ref 82–98)
PLATELET # BLD AUTO: 360 THOUSANDS/UL (ref 149–390)
PMV BLD AUTO: 8.5 FL (ref 8.9–12.7)
POTASSIUM SERPL-SCNC: 4.2 MMOL/L (ref 3.5–5.3)
PROT SERPL-MCNC: 7.5 G/DL (ref 6.4–8.4)
PSA SERPL-MCNC: 0.4 NG/ML (ref 0–4)
RBC # BLD AUTO: 5.79 MILLION/UL (ref 3.88–5.62)
SODIUM SERPL-SCNC: 135 MMOL/L (ref 135–147)
TRIGL SERPL-MCNC: 115 MG/DL
TSH SERPL DL<=0.05 MIU/L-ACNC: 1.63 UIU/ML (ref 0.45–4.5)
WBC # BLD AUTO: 5.12 THOUSAND/UL (ref 4.31–10.16)

## 2023-04-30 LAB
EST. AVERAGE GLUCOSE BLD GHB EST-MCNC: 117 MG/DL
HBA1C MFR BLD: 5.7 %

## 2023-05-31 ENCOUNTER — EVALUATION (OUTPATIENT)
Dept: PHYSICAL THERAPY | Facility: OTHER | Age: 58
End: 2023-05-31

## 2023-05-31 DIAGNOSIS — M54.12 RADICULOPATHY, CERVICAL REGION: ICD-10-CM

## 2023-05-31 NOTE — PROGRESS NOTES
PT Evaluation     Today's date: 2023  Patient name: Rome Hunter  : 1965  MRN: [id number]  Referring provider: Jessy Beaulieu DO  Dx:   Encounter Diagnosis     ICD-10-CM    1  Radiculopathy, cervical region  M54 12 Ambulatory Referral to Physical Therapy                     Assessment  Assessment details: Rome Hunter is a pleasant 60 y o  male who presents with signs and symptoms consistent with cervical radiculopathy  Upon evaluation he demonstrates decreased ROM, decreased scap strength, hypertonicity of his upper trap, suboccipitals, and scalenes, and decreased cervical and thoracic joint mobility  He would benefit from completing skilled PT to address the impairements listed above in order to maximize his function with work and recreational tasks and prevent future injury  No further referral appears necessary at this time based upon examination results  Positive prognostic indicators include positive attitude toward recovery and good understanding of diagnosis and treatment plan options  Negative prognostic indicators include chronicity of symptoms  Problem List:  1) decreased cervical and thoracic mobility  2) decreased scap strength  3) hypertonicity of upper trap  4) neck pain  Impairments: abnormal muscle tone, abnormal or restricted ROM, abnormal movement, activity intolerance, impaired physical strength, pain with function, poor posture  and poor body mechanics    Goals  STG's to be achieved in 4 weeks:  1) Patient will demonstrate normal pain free cervical ROM  2) Patient will demonstrate normal postural alignment with no greater than 2/10 neck pain  3) Patient will report no greater than 2/10 neck pain with driving for 29+ min    LTG's to be achieved in 8 weeks:  1) Patient will be independent and compliant with HEP  2) Patient will report no greater than 2/10 neck pain with resuming his lifting routine  3) Patient will score 75 or greater on FOTO  Plan  Frequency: 2x week  Duration in weeks: 8        Subjective Evaluation    History of Present Illness  Mechanism of injury: Patient reports that neck pain began on his L side approximately 3 weeks ago  States pain was intense down into his neck and arm  He also noted L sided sciatica symptoms  Since resuming his stretching routine this pain has significantly improved  He notes that he had resumed a lifting routine and had eleazar focused on pecs, upper trap, biceps, triceps, but, has not been focusing on postural muscles or stretching  Pain  Current pain ratin  At best pain ratin  At worst pain rating: 10    Patient Goals  Patient goals for therapy: decreased pain, independence with ADLs/IADLs, increased strength and return to sport/leisure activities          Objective     Strength/Myotome Testing     Left Shoulder     Isolated Muscles   Latissimus: 4   Lower trapezius: 4-   Middle trapezius: 4-   Rhomboids: 4-     Right Shoulder     Isolated Muscles   Latissimus: 4   Lower trapezius: 4-   Middle trapezius: 4-   Rhomboids: 4-     General Comments:      Cervical/Thoracic Comments  ROM: extension-  25%robertson, flexion-  25%robertson, pain at end-range, R SB-   50%robertson, L SB-  50%robertson, R rot-   50%robertson, L rot-   50%robertson  Repeated ROM: symptoms decreased with repeated retraction  Joint Mobility: hypomobile throughout  Palpation: hypertonicity of upper trap  Dermatomes:  WNL  Myotomes:  WNL  Special Tests: Vertebral Artery- (-), Sharp-Pursar- (-), Alar Ligament- (-), Spurling's- (+), Compression/Distraction (+/+) ULNT Median- (+), ULNT Radial- (-), ULNT Ulnar (-)               Precautions: cervical radiculopathy, potential HNP      Manuals             Dc upper trap EB                                                   Neuro Re-Ed             YTIW 10 ea 3#                                                                                          Ther Ex             ube             SNAG ext 10 ea            Upper trap "stretch 10 x 10\"            tspine ext 10 x 10\"                                                                Ther Activity                                       Gait Training                                       Modalities                                          "

## 2023-06-06 ENCOUNTER — OFFICE VISIT (OUTPATIENT)
Dept: PHYSICAL THERAPY | Facility: OTHER | Age: 58
End: 2023-06-06
Payer: COMMERCIAL

## 2023-06-06 DIAGNOSIS — M54.12 RADICULOPATHY, CERVICAL REGION: Primary | ICD-10-CM

## 2023-06-06 PROCEDURE — 97110 THERAPEUTIC EXERCISES: CPT | Performed by: PHYSICAL THERAPIST

## 2023-06-06 PROCEDURE — 97140 MANUAL THERAPY 1/> REGIONS: CPT | Performed by: PHYSICAL THERAPIST

## 2023-06-06 PROCEDURE — 97112 NEUROMUSCULAR REEDUCATION: CPT | Performed by: PHYSICAL THERAPIST

## 2023-06-06 NOTE — PROGRESS NOTES
"Daily Note     Today's date: 2023  Patient name: Candice Campo  : 1965  MRN: [id number]  Referring provider: Ignacia Mccord DO  Dx:   Encounter Diagnosis     ICD-10-CM    1  Radiculopathy, cervical region  M54 12                    1 on 1 for entirety  Subjective: Patient reports he had a rough day on Saturday after digging holes in the yard (noted increased upper trap tension)  However, states that after completing his home exercise program he had significant improvement in symptoms  Objective: See treatment diary below      Assessment: Tolerated treatment well  Patient demonstrated fatigue post treatment, exhibited good technique with therapeutic exercises and would benefit from continued PT      Plan: Continue per plan of care        Precautions: cervical radiculopathy, potential HNP      Manuals            MFDc upper trap EB EB                                                  Neuro Re-Ed             YTIW 10 ea 3# 2 x 10, 3#           Wall slide  10 x 10\" plum                                                                            Ther Ex             ube  3/3 L4           SNAG ext 10 ea 10 ea           Upper trap stretch 10 x 10\" 10 x 10\"           tspine ext 10 x 10\" 10 x 10\"           Doorway pec stretch  4 x 30\"                                                  Ther Activity                                       Gait Training                                       Modalities                                            "

## 2023-06-13 ENCOUNTER — OFFICE VISIT (OUTPATIENT)
Dept: PHYSICAL THERAPY | Facility: OTHER | Age: 58
End: 2023-06-13
Payer: COMMERCIAL

## 2023-06-13 DIAGNOSIS — M54.12 RADICULOPATHY, CERVICAL REGION: Primary | ICD-10-CM

## 2023-06-13 PROCEDURE — 97140 MANUAL THERAPY 1/> REGIONS: CPT | Performed by: PHYSICAL THERAPIST

## 2023-06-13 PROCEDURE — 97112 NEUROMUSCULAR REEDUCATION: CPT | Performed by: PHYSICAL THERAPIST

## 2023-06-13 PROCEDURE — 97110 THERAPEUTIC EXERCISES: CPT | Performed by: PHYSICAL THERAPIST

## 2023-06-13 NOTE — PROGRESS NOTES
"Daily Note     Today's date: 2023  Patient name: Sierra Lee  : 1965  MRN: [id number]  Referring provider: Lori Nieves DO  Dx:   Encounter Diagnosis     ICD-10-CM    1  Radiculopathy, cervical region  M54 12           Start Time: 1630  Stop Time: 4973  Total time in clinic (min): 45 minutes  1 on 1 for entirety    Subjective: Patient reports he had tightness about two days after last session  However, notes he feels much better about completion of his home exercise program        Objective: See treatment diary below      Assessment: Tolerated treatment well  Patient demonstrated fatigue post treatment and exhibited good technique with therapeutic exercises  Patient reports compliance with HEP  Patient demonstrates good form and independence with current program all radicular symptoms have resolved and patient has returned to recreational lifting and bike riding  He has met all functional goals and is d/c to HEP at this time  I will happily see Mr Shelia Bergeron again in the future should any future issues arise  Plan: Continue per plan of care        Precautions: cervical radiculopathy, potential HNP      Manuals           MFDc upper trap EB EB EB                                                 Neuro Re-Ed             YTIW 10 ea 3# 2 x 10, 3# 2 x 10, 2#          Wall slide  10 x 10\" plum 10 x 10\"                                                                           Ther Ex             ube  3/3 L4 3/3          SNAG ext 10 ea 10 ea 10 ea           Upper trap stretch 10 x 10\" 10 x 10\" 10 x 10\"          tspine ext 10 x 10\" 10 x 10\" 10 x 10\"          Doorway pec stretch  4 x 30\" 4 x 30\"                                                 Ther Activity                                       Gait Training                                       Modalities                                            "

## 2023-06-20 ENCOUNTER — APPOINTMENT (OUTPATIENT)
Dept: PHYSICAL THERAPY | Facility: OTHER | Age: 58
End: 2023-06-20
Payer: COMMERCIAL

## 2023-06-27 ENCOUNTER — APPOINTMENT (OUTPATIENT)
Dept: PHYSICAL THERAPY | Facility: OTHER | Age: 58
End: 2023-06-27
Payer: COMMERCIAL

## 2023-09-06 DIAGNOSIS — E03.9 HYPOTHYROIDISM, UNSPECIFIED TYPE: ICD-10-CM

## 2023-09-06 DIAGNOSIS — R12 HEARTBURN: ICD-10-CM

## 2023-09-06 RX ORDER — OMEPRAZOLE 40 MG/1
CAPSULE, DELAYED RELEASE ORAL
Qty: 90 CAPSULE | Refills: 0 | Status: SHIPPED | OUTPATIENT
Start: 2023-09-06

## 2023-09-06 RX ORDER — LEVOTHYROXINE SODIUM 112 UG/1
112 TABLET ORAL
Qty: 90 TABLET | Refills: 0 | Status: SHIPPED | OUTPATIENT
Start: 2023-09-06

## 2023-10-24 ENCOUNTER — OFFICE VISIT (OUTPATIENT)
Dept: FAMILY MEDICINE CLINIC | Facility: CLINIC | Age: 58
End: 2023-10-24
Payer: COMMERCIAL

## 2023-10-24 VITALS
BODY MASS INDEX: 35.94 KG/M2 | DIASTOLIC BLOOD PRESSURE: 70 MMHG | HEIGHT: 67 IN | SYSTOLIC BLOOD PRESSURE: 116 MMHG | HEART RATE: 66 BPM | RESPIRATION RATE: 16 BRPM | TEMPERATURE: 97.7 F | WEIGHT: 229 LBS | OXYGEN SATURATION: 97 %

## 2023-10-24 DIAGNOSIS — R73.01 IFG (IMPAIRED FASTING GLUCOSE): ICD-10-CM

## 2023-10-24 DIAGNOSIS — Z23 ENCOUNTER FOR IMMUNIZATION: ICD-10-CM

## 2023-10-24 DIAGNOSIS — K62.5 RECTAL BLEEDING: ICD-10-CM

## 2023-10-24 DIAGNOSIS — E03.9 ACQUIRED HYPOTHYROIDISM: Primary | ICD-10-CM

## 2023-10-24 DIAGNOSIS — E78.2 MIXED HYPERLIPIDEMIA: ICD-10-CM

## 2023-10-24 PROCEDURE — 90471 IMMUNIZATION ADMIN: CPT

## 2023-10-24 PROCEDURE — 90686 IIV4 VACC NO PRSV 0.5 ML IM: CPT

## 2023-10-24 PROCEDURE — 99214 OFFICE O/P EST MOD 30 MIN: CPT | Performed by: FAMILY MEDICINE

## 2023-10-24 NOTE — PROGRESS NOTES
Assessment/Plan:    1. Acquired hypothyroidism  Assessment & Plan:  Check tsh    Orders:  -     TSH, 3rd generation with Free T4 reflex; Future; Expected date: 10/24/2023    2. Mixed hyperlipidemia  Assessment & Plan:  Check lipids    Orders:  -     Comprehensive metabolic panel; Future; Expected date: 10/24/2023  -     Lipid Panel with Direct LDL reflex; Future; Expected date: 10/24/2023    3. Encounter for immunization  -     influenza vaccine, quadrivalent, 0.5 mL, preservative-free, for adult and pediatric patients 6 mos+ (AFLURIA, FLUARIX, FLULAVAL, FLUZONE)    4. IFG (impaired fasting glucose)  Assessment & Plan:  Check a1c    Orders:  -     Hemoglobin A1C; Future    5. Rectal bleeding  Assessment & Plan:  Persistent  Refer for colonoscopy  Due in 2024    Orders:  -     Ambulatory Referral to Gastroenterology; Future            There are no Patient Instructions on file for this visit. Return in about 6 months (around 4/24/2024) for Annual physical.    Subjective:      Patient ID: Roly Gonzalez is a 60 y.o. male. Chief Complaint   Patient presents with   • Follow-up     Patient being seen for 6 month follow up         Here for follow p  Was having some rectal bleeding  Stopped taking supplements and exercising  At home weight 212  When lift heavy has rectal bleeding but not on stool  Not exercising          The following portions of the patient's history were reviewed and updated as appropriate: allergies, current medications, past family history, past medical history, past social history, past surgical history and problem list.    Review of Systems   Constitutional: Negative. HENT: Negative. Eyes: Negative. Respiratory: Negative. Cardiovascular: Negative. Gastrointestinal:  Positive for anal bleeding. Endocrine: Negative. Genitourinary: Negative. Musculoskeletal:  Positive for arthralgias (shoulder pain). Allergic/Immunologic: Negative. Neurological: Negative. Hematological: Negative. Psychiatric/Behavioral: Negative. Current Outpatient Medications   Medication Sig Dispense Refill   • ALPRAZolam (XANAX) 0.25 mg tablet Take 1 1/2 Tablet By Mouth At Bedtime 45 tablet 5   • aspirin (ECOTRIN LOW STRENGTH) 81 mg EC tablet Take 81 mg by mouth daily     • Co Q-10 50 MG Take 50 mg by mouth     • levothyroxine 112 mcg tablet TAKE ONE TABLET BY MOUTH DAILY EARLY IN THE MORNING 90 tablet 0   • Multiple Vitamins-Minerals (CENTRUM SILVER 50+MEN PO) Take 1 tablet by mouth daily     • omeprazole (PriLOSEC) 40 MG capsule TAKE 1 CAPSULE BY MOUTH DAILY 90 capsule 0   • rosuvastatin (CRESTOR) 20 MG tablet TAKE 1 TABLET BY MOUTH DAILY 90 tablet 3     No current facility-administered medications for this visit. Objective:    /70 (BP Location: Left arm, Patient Position: Sitting, Cuff Size: Large)   Pulse 66   Temp 97.7 °F (36.5 °C) (Tympanic)   Resp 16   Ht 5' 7" (1.702 m)   Wt 104 kg (229 lb)   SpO2 97%   BMI 35.87 kg/m²        Physical Exam  Vitals and nursing note reviewed. Constitutional:       Appearance: Normal appearance. He is well-developed. HENT:      Head: Normocephalic and atraumatic. Right Ear: External ear normal.      Left Ear: External ear normal.      Nose: Nose normal.   Eyes:      General: Lids are normal.      Extraocular Movements: Extraocular movements intact. Conjunctiva/sclera: Conjunctivae normal.      Pupils: Pupils are equal, round, and reactive to light. Cardiovascular:      Rate and Rhythm: Normal rate and regular rhythm. Pulses: Normal pulses. Heart sounds: Normal heart sounds, S1 normal and S2 normal.   Pulmonary:      Effort: Pulmonary effort is normal.      Breath sounds: Normal breath sounds. Abdominal:      General: Abdomen is flat. Bowel sounds are normal.      Palpations: Abdomen is soft. Musculoskeletal:         General: Normal range of motion.       Cervical back: Normal range of motion and neck supple. Skin:     General: Skin is warm and dry. Neurological:      General: No focal deficit present. Mental Status: He is alert and oriented to person, place, and time. Deep Tendon Reflexes: Reflexes are normal and symmetric. Psychiatric:         Speech: Speech normal.         Behavior: Behavior normal.         Thought Content:  Thought content normal.         Judgment: Judgment normal.                Mila All, DO

## 2023-11-06 DIAGNOSIS — F41.9 ANXIETY: ICD-10-CM

## 2023-11-07 RX ORDER — ALPRAZOLAM 0.25 MG/1
TABLET ORAL
Qty: 45 TABLET | Refills: 5 | Status: SHIPPED | OUTPATIENT
Start: 2023-11-07

## 2023-11-07 NOTE — TELEPHONE ENCOUNTER
Medication:  PDMP   09/06/2023 03/13/2023 ALPRAZolam (Tablet) 45.0 30 0.25 MG NA LUIS MANUEL URBINA     Active agreement on file -No

## 2023-12-10 DIAGNOSIS — E78.2 MIXED HYPERLIPIDEMIA: ICD-10-CM

## 2023-12-10 DIAGNOSIS — R12 HEARTBURN: ICD-10-CM

## 2023-12-11 RX ORDER — ROSUVASTATIN CALCIUM 20 MG/1
TABLET, COATED ORAL
Qty: 90 TABLET | Refills: 1 | Status: SHIPPED | OUTPATIENT
Start: 2023-12-11

## 2023-12-11 RX ORDER — OMEPRAZOLE 40 MG/1
CAPSULE, DELAYED RELEASE ORAL
Qty: 90 CAPSULE | Refills: 1 | Status: SHIPPED | OUTPATIENT
Start: 2023-12-11

## 2023-12-13 DIAGNOSIS — E03.9 HYPOTHYROIDISM, UNSPECIFIED TYPE: ICD-10-CM

## 2023-12-14 ENCOUNTER — CONSULT (OUTPATIENT)
Dept: GASTROENTEROLOGY | Facility: AMBULARY SURGERY CENTER | Age: 58
End: 2023-12-14
Payer: COMMERCIAL

## 2023-12-14 VITALS
BODY MASS INDEX: 35 KG/M2 | OXYGEN SATURATION: 98 % | WEIGHT: 223 LBS | DIASTOLIC BLOOD PRESSURE: 78 MMHG | HEIGHT: 67 IN | HEART RATE: 64 BPM | SYSTOLIC BLOOD PRESSURE: 122 MMHG

## 2023-12-14 DIAGNOSIS — R74.8 ELEVATED LIVER ENZYMES: ICD-10-CM

## 2023-12-14 DIAGNOSIS — K62.5 RECTAL BLEEDING: ICD-10-CM

## 2023-12-14 DIAGNOSIS — K21.9 GASTROESOPHAGEAL REFLUX DISEASE WITHOUT ESOPHAGITIS: Primary | ICD-10-CM

## 2023-12-14 PROCEDURE — 99204 OFFICE O/P NEW MOD 45 MIN: CPT | Performed by: INTERNAL MEDICINE

## 2023-12-14 RX ORDER — LEVOTHYROXINE SODIUM 112 UG/1
112 TABLET ORAL
Qty: 90 TABLET | Refills: 1 | Status: SHIPPED | OUTPATIENT
Start: 2023-12-14

## 2023-12-14 NOTE — PROGRESS NOTES
Consultation - [address]  [facility]  Nela Russo 60 y.o. male MRN: [id number]  Unit/Bed#:  Encounter: [id number]        Consults    ASSESSMENT/PLAN:     1. Rectal bleeding: Likely hemorrhoidal as these were noted on colonoscopy 4 years ago, however given symptoms ongoing for the past 1 year, may be reasonable to repeat colonoscopy to rule out any colon polyps or lesions. -Meanwhile, recommend starting on a fiber supplement and avoid bearing down, avoid straining.  -Started on Metamucil 1 tablespoon daily basis. - Recommend Preparation H suppositories.  -Will plan for colonoscopy to rule out any colon polyps or lesions. Would recommend MiraLAX/Dulcolax bowel prep. Patient was explained about the risks and benefits of the procedure. Risks including but not limited to bleeding, infection, perforation were explained in detail. Also explained about less than 100% sensitivity with the exam and other alternatives. 2.  Chronic GERD with irregular Z-line: Distal esophageal biopsies were consistent with chronic inflammation however did not show any Laureano's esophagus, endoscopic findings were concerning for possible short segment Laureano's esophagus therefore patient was recommended a repeat EGD at 2 to 3-year interval.  This has not yet been done. Patient reports that he has been on omeprazole 40 mg which seems to be controlling his symptoms however every time he tries to come off he has recurrent symptoms.  -Would recommend EGD at this time to assess for Laureano's esophagus. - If there is no evidence of Laureano's esophagus, would recommend transitioning to H2 blocker instead given long-term side effects associate with chronic PPI use. It was discussed with the patient.  -Continue to follow GERD diet. - Continue to avoid NSAIDs when possible.         3.  Mild elevation of AST: Possibly due to alcohol use, patient reports being a heavy drinker in the past however reports that since his shoulder injury he has cut down on alcohol and only drinks on the weekend.  -Would recommend repeating liver enzymes again.  -If LFTs are persistently elevated, would recommend further workup including ultrasound. -Meanwhile continue to minimize intake of alcohol, avoid hepatotoxic medications and work on healthy weight loss his BMI is approaching 35.          ______________________________________________________________________    Reason for Consult / Principal Problem: [unfilled]    HPI: Efrain Little is a 60y.o. year old male with history of hypothyroidism, anxiety, hyperlipidemia, longstanding symptoms of GERD, presents for evaluation of rectal bleeding. Patient reports that he has been having intermittent rectal bleeding for almost a year at this time. Patient reports that he notices bright red blood with bowel movements. He denies any melena or bloody stools otherwise. He denies any abdominal pain or rectal pain. No unintentional weight loss. He underwent EGD and colonoscopy in 2019 with colonoscopy notable for single colon polyp, left-sided diverticulosis and internal hemorrhoids. This was a hyperplastic polyp, patient was recommended repeat colonoscopy at 5-year interval.  Patient is unsure of the family history. EGD was notable for irregular squamocolumnar junction however biopsies were negative for Laureano's esophagus however given endoscopic appearance, patient was recommended to continue PPI and repeat EGD in 2-year interval.  There was no evidence of H. pylori or intestinal metaplasia on gastric biopsies, there was no evidence of celiac disease. He tells me that he has been on omeprazole for the past few years since his EGD. Patient reports that if he misses a dose of omeprazole he has recurrent symptoms of acid reflux. He reports that he tried coming off of PPI however this resulted in recurrence of acid reflux. Denies any dysphagia, loss of appetite or early satiety.   No hematemesis, coffee emesis or melena. His most recent blood work is from 2023, hemoglobin is noted to be 16, platelets of 172. AST was mildly elevated at 48 otherwise ALT was within normal range, thyroid function was normal.  Remainder of the liver enzymes were within normal range including total bilirubin. Review of Systems: The remainder of the review of systems was negative except for the pertinent positives noted in HPI. Historical Information   Past Medical History:   Diagnosis Date    GERD (gastroesophageal reflux disease)     Pulmonary embolism ([address])     last assessed 1/10/2013    Snoring 2016     Past Surgical History:   Procedure Laterality Date    VT SURGICAL ARTHROSCOPY SHOULDER W/ROTATOR CUFF RPR Left 2022    Procedure: SHOULDER ARTHROSCOPIC ROTATOR CUFF REPAIR BICEPS TENODISIS;  Surgeon: Marlene Leslie MD;  Location: [facility] MAIN [facility];  Service: Orthopedics    TONSILLECTOMY AND ADENOIDECTOMY       Social History   Social History     Substance and Sexual Activity   Alcohol Use Yes    Comment: occ     Social History     Substance and Sexual Activity   Drug Use No     Social History     Tobacco Use   Smoking Status Former    Current packs/day: 0.00    Types: Cigarettes    Quit date:     Years since quittin.9   Smokeless Tobacco Never     Family History   Problem Relation Age of Onset    Diabetes Mother         DM    Hypothyroidism Mother     Glaucoma Mother     Hypertension Mother     Alcohol abuse Sister     Drug abuse Sister     Hypothyroidism Sister     Lung cancer Maternal Grandmother        Meds/Allergies     (Not in a hospital admission)    No current facility-administered medications for this visit. Allergies   Allergen Reactions    Esomeprazole        Objective     Blood pressure 122/78, pulse 64, height 5' 7" (1.702 m), weight 101 kg (223 lb), SpO2 98%.     [unfilled]    PHYSICAL EXAM     GEN: well nourished, well developed, no acute distress  HEENT: anicteric, MMM, no cervical or supraclavicular lymphadenopathy  CV: RRR, no m/r/g  CHEST: CTA b/l, no WRR  ABD: +BS, soft, NT/ND, no hepatosplenomegaly  EXT: no c/c/e  SKIN: no rashes,  NEURO: aaox3    Lab Results:   No visits with results within 1 Day(s) from this visit.    Latest known visit with results is:   Appointment on 04/27/2023   Component Date Value    WBC 04/27/2023 5.12     RBC 04/27/2023 5.79 (H)     Hemoglobin 04/27/2023 16.2     Hematocrit 04/27/2023 51.3 (H)     MCV 04/27/2023 89     MCH 04/27/2023 28.0     MCHC 04/27/2023 31.6     RDW 04/27/2023 14.2     Platelets 40/46/5800 360     MPV 04/27/2023 8.5 (L)     Sodium 04/27/2023 135     Potassium 04/27/2023 4.2     Chloride 04/27/2023 106     CO2 04/27/2023 28     ANION GAP 04/27/2023 1 (L)     BUN 04/27/2023 23     Creatinine 04/27/2023 1.08     Glucose, Fasting 04/27/2023 93     Calcium 04/27/2023 9.4     AST 04/27/2023 48 (H)     ALT 04/27/2023 74     Alkaline Phosphatase 04/27/2023 51     Total Protein 04/27/2023 7.5     Albumin 04/27/2023 3.7     Total Bilirubin 04/27/2023 0.55     eGFR 04/27/2023 74     Hemoglobin A1C 04/27/2023 5.7 (H)     EAG 04/27/2023 117     Cholesterol 04/27/2023 117     Triglycerides 04/27/2023 115     HDL, Direct 04/27/2023 30 (L)     LDL Calculated 04/27/2023 64     TSH 3RD GENERATON 04/27/2023 1.630     PSA 04/27/2023 0.4      Imaging Studies: I have personally reviewed pertinent films in PACS

## 2023-12-14 NOTE — PATIENT INSTRUCTIONS
Scheduled date of EGD/colonoscopy (as of today):  03/05/24  Physician performing EGD/colonoscopy:  Dr Cleveland Guerra  Location of EGD/colonoscopy:  [facility]   Desired bowel prep reviewed with patient: Miralax/Dulcolax  Instructions reviewed with patient by:  Pam ZAPATA   Clearances:  n/a

## 2023-12-29 DIAGNOSIS — E03.9 HYPOTHYROIDISM, UNSPECIFIED TYPE: ICD-10-CM

## 2023-12-29 RX ORDER — LEVOTHYROXINE SODIUM 112 UG/1
112 TABLET ORAL
Qty: 90 TABLET | Refills: 1 | Status: SHIPPED | OUTPATIENT
Start: 2023-12-29

## 2024-01-15 ENCOUNTER — OFFICE VISIT (OUTPATIENT)
Age: 59
End: 2024-01-15
Payer: COMMERCIAL

## 2024-01-15 VITALS
HEART RATE: 84 BPM | WEIGHT: 223 LBS | SYSTOLIC BLOOD PRESSURE: 118 MMHG | BODY MASS INDEX: 35 KG/M2 | OXYGEN SATURATION: 98 % | DIASTOLIC BLOOD PRESSURE: 76 MMHG | HEIGHT: 67 IN

## 2024-01-15 DIAGNOSIS — M54.2 NECK PAIN: ICD-10-CM

## 2024-01-15 DIAGNOSIS — M99.02 SEGMENTAL DYSFUNCTION OF THORACIC REGION: Primary | ICD-10-CM

## 2024-01-15 DIAGNOSIS — M99.03 SEGMENTAL DYSFUNCTION OF LUMBAR REGION: ICD-10-CM

## 2024-01-15 DIAGNOSIS — M99.04 SEGMENTAL DYSFUNCTION OF SACRAL REGION: ICD-10-CM

## 2024-01-15 DIAGNOSIS — M54.59 MECHANICAL LOW BACK PAIN: ICD-10-CM

## 2024-01-15 DIAGNOSIS — M99.01 SEGMENTAL DYSFUNCTION OF CERVICAL REGION: ICD-10-CM

## 2024-01-15 PROCEDURE — 97110 THERAPEUTIC EXERCISES: CPT | Performed by: CHIROPRACTOR

## 2024-01-15 PROCEDURE — 98941 CHIROPRACT MANJ 3-4 REGIONS: CPT | Performed by: CHIROPRACTOR

## 2024-01-15 PROCEDURE — 99203 OFFICE O/P NEW LOW 30 MIN: CPT | Performed by: CHIROPRACTOR

## 2024-01-15 NOTE — PROGRESS NOTES
Diagnoses and all orders for this visit:    Segmental dysfunction of thoracic region    Neck pain    Segmental dysfunction of cervical region    Segmental dysfunction of lumbar region    Segmental dysfunction of sacral region    Mechanical low back pain      ASSESSMENT:  No red flags, radiculopathy or neurologic deficit appreciated clinically. Pt's symptoms and exam findings consistent with mechanical neck pain secondary to repetitive st/sp injury of discogenic origin. Pt responded well to extension biased stretches and manual mobilization of the affected spinal and myofascial tissues with increased ROM; trial of conservative tx recommended consisting on Melony extension biased exercises, graded mobilization/manipulation of the affected tissues, postural/ergonomic education and take home stretches/exercises. If symptoms fail to centralize or neurologic deficit presents, appropriate imaging and referral will be coordinated.  Spent greater than 30 min c pt discussing hx, pe, ddx, tx options and reviewing intake notes/imaging    PROCEDURE CODES: 11854 and 70603, 33058    TREATMENT:  Fear avoidance behavior discussion, encouraged and reassured pt that natural course of condition is to improve over time with adherence to tx plan and home care strategies. Home care recommendations: walk to tolerance, gradual return to activity to tolerance (avoid anything that peripheralizes symptoms), call if symptoms peripheralize, worsen, or neurologic deficit progresses. Postural education, avoid heavy lifting, and prolonged cervical flexion. Use cervical roll as recommended, Use of lumbar roll as recommended, Ther-ex: IASTM to affected mm hypertonicities (discussed soreness/ecchymosis up to 36 hrs post procedure); cervical axial retraction, Brueggers position, seated scapular retraction, greater than 15 spent on above mentioned ther-ex. Cervical mobilization/ long axis traction- manual with chin retraction, Thoracic  mobilization/manipulation: prone P-A mob, Prone A-P CT manip; Lumbar mobilization- Flexion distraction, Sacrum. L SIJ- Long axis distraction    HPI  Carlos Irving is a 58 y.o. male  Chief Complaint   Patient presents with   • Neck Pain     Neck pain-10   • Shoulder Pain     Left shoulder pain-10     The patient presents to the office with neck and arm pain that has been an issue for several years. No specific trauma. That patient reports she has L shoulder surgery two years ago. The patient reports two weeks ago he has some pain in the L sided again without specific incident. The patient is an  and now works with [employer]. Less physical than before. Usually lifts weights but work on elastic bands and its helping.     Neck Pain   This is a chronic problem. The current episode started more than 1 year ago. The problem occurs 2 to 4 times per day. The problem has been waxing and waning. The pain is associated with lifting a heavy object. Pertinent negatives include no chest pain, fever, headaches, numbness or weakness.   Shoulder Pain   The pain is at a severity of 10/10. Pertinent negatives include no fever or numbness.     Past Medical History:   Diagnosis Date   • GERD (gastroesophageal reflux disease)    • Pulmonary embolism (HCC)     last assessed 1/10/2013   • Snoring 4/25/2016      Past Surgical History:   Procedure Laterality Date   • [place] SURGICAL ARTHROSCOPY SHOULDER W/ROTATOR CUFF RPR Left 7/29/2022    Procedure: SHOULDER ARTHROSCOPIC ROTATOR CUFF REPAIR BICEPS TENODISIS;  Surgeon: Chino Fragoso MD;  Location: [facility] MAIN OR;  Service: Orthopedics   • TONSILLECTOMY AND ADENOIDECTOMY       The following portions of the patient's history were reviewed and updated as appropriate: allergies, past family history, past medical history, past social history, past surgical history, and problem list.  Review of Systems   Constitutional:  Negative for activity change, fatigue, fever and  unexpected weight change.   HENT:  Negative for ear pain, hearing loss, sinus pressure, sinus pain, sore throat and tinnitus.    Respiratory:  Negative for chest tightness, shortness of breath, wheezing and stridor.    Cardiovascular:  Negative for chest pain.   Genitourinary:  Negative for flank pain and frequency.   Musculoskeletal:  Positive for neck pain. Negative for back pain, joint swelling and neck stiffness.   Skin:  Negative for color change and pallor.   Neurological:  Negative for dizziness, speech difficulty, weakness, numbness and headaches.   Psychiatric/Behavioral:  Negative for agitation and sleep disturbance. The patient is not nervous/anxious.      Physical Exam  Constitutional:       General: He is not in acute distress.     Appearance: Normal appearance.   HENT:      Head: Normocephalic.      Mouth/Throat:      Mouth: Mucous membranes are moist.   Eyes:      Extraocular Movements: Extraocular movements intact.      Conjunctiva/sclera: Conjunctivae normal.      Pupils: Pupils are equal, round, and reactive to light.   Neck:      Vascular: No carotid bruit.   Pulmonary:      Effort: Pulmonary effort is normal.   Chest:      Chest wall: No tenderness.   Abdominal:      General: Abdomen is flat.      Palpations: Abdomen is soft.   Musculoskeletal:         General: Tenderness present. No swelling, deformity or signs of injury.      Cervical back: Rigidity, spasms, tenderness and crepitus present. No swelling, edema, deformity, erythema, signs of trauma, lacerations, torticollis or bony tenderness. Pain with movement present. Decreased range of motion.      Thoracic back: Spasms and tenderness present. No swelling, edema, deformity, signs of trauma, lacerations or bony tenderness. Decreased range of motion. No scoliosis.      Lumbar back: Spasms and tenderness present. No swelling, edema, deformity, signs of trauma, lacerations or bony tenderness. Decreased range of motion. No scoliosis.         Back:       Right lower leg: No edema.      Left lower leg: No edema.   Lymphadenopathy:      Cervical: No cervical adenopathy.   Skin:     General: Skin is warm.      Coloration: Skin is not jaundiced or pale.      Findings: No bruising or erythema.   Neurological:      Mental Status: He is alert and oriented to person, place, and time.      Cranial Nerves: No cranial nerve deficit.      Sensory: No sensory deficit.      Motor: No weakness.      Gait: Gait is intact.      Deep Tendon Reflexes: Reflexes are normal and symmetric.   Psychiatric:         Attention and Perception: Attention normal.         Mood and Affect: Mood and affect normal.         Speech: Speech normal.         Behavior: Behavior normal. Behavior is cooperative.         Thought Content: Thought content normal.         Cognition and Memory: Cognition normal.         Judgment: Judgment normal.       SOFT TISSUE ASSESSMENT: Hypertonicity and tenderness palpated B C4-T2 T10-S1, L SIJ Upper trap, levatory scap, L SCM, rhomboids, erector spinae, hip flexor, glute med/min JOINT RESTRICTIONS:C4-T2 T10-Ss. L SIJ ORTHO:Melony repeated flexion peripheralizes, extension centralizes;maximal foraminal compression-neg,cervical distraction- relieving symptoms; Spurlings negative for reproduction of radicular symptoms, SLR- Neg, Sherif- neg, FABERE- neg, Sitting ROOT- neg, SLUMP- Neg for neural tension    Return in about 1 week (around 1/22/2024) for Recheck.

## 2024-01-22 ENCOUNTER — PROCEDURE VISIT (OUTPATIENT)
Age: 59
End: 2024-01-22
Payer: COMMERCIAL

## 2024-01-22 VITALS
OXYGEN SATURATION: 96 % | WEIGHT: 223 LBS | DIASTOLIC BLOOD PRESSURE: 76 MMHG | BODY MASS INDEX: 35 KG/M2 | SYSTOLIC BLOOD PRESSURE: 120 MMHG | HEART RATE: 68 BPM | HEIGHT: 67 IN

## 2024-01-22 DIAGNOSIS — M54.2 NECK PAIN: ICD-10-CM

## 2024-01-22 DIAGNOSIS — M99.03 SEGMENTAL DYSFUNCTION OF LUMBAR REGION: ICD-10-CM

## 2024-01-22 DIAGNOSIS — M54.59 MECHANICAL LOW BACK PAIN: ICD-10-CM

## 2024-01-22 DIAGNOSIS — M99.04 SEGMENTAL DYSFUNCTION OF SACRAL REGION: ICD-10-CM

## 2024-01-22 DIAGNOSIS — M99.02 SEGMENTAL DYSFUNCTION OF THORACIC REGION: Primary | ICD-10-CM

## 2024-01-22 DIAGNOSIS — M99.01 SEGMENTAL DYSFUNCTION OF CERVICAL REGION: ICD-10-CM

## 2024-01-22 PROCEDURE — 97110 THERAPEUTIC EXERCISES: CPT | Performed by: CHIROPRACTOR

## 2024-01-22 PROCEDURE — 98941 CHIROPRACT MANJ 3-4 REGIONS: CPT | Performed by: CHIROPRACTOR

## 2024-01-22 NOTE — PROGRESS NOTES
Diagnoses and all orders for this visit:    Segmental dysfunction of thoracic region    Neck pain    Segmental dysfunction of cervical region    Segmental dysfunction of lumbar region    Segmental dysfunction of sacral region    Mechanical low back pain      ASSESSMENT:  Pt's symptoms and exam findings consistent with mechanical neck pain secondary to repetitive st/sp injury of discogenic origin. Pt responded well to extension biased stretches and manual mobilization of the affected spinal and myofascial tissues with increased ROM; trial of conservative tx recommended consisting on Melony extension biased exercises, graded mobilization/manipulation of the affected tissues, postural/ergonomic education and take home stretches/exercises. If symptoms fail to centralize or neurologic deficit presents, appropriate imaging and referral will be coordinated.  -Pt tolerated treatment well with some improvements in pain and mm spasm    PROCEDURE CODES: 36070 and 98628    TREATMENT:  Fear avoidance behavior discussion, encouraged and reassured pt that natural course of condition is to improve over time with adherence to tx plan and home care strategies. Home care recommendations: walk to tolerance, gradual return to activity to tolerance (avoid anything that peripheralizes symptoms), call if symptoms peripheralize, worsen, or neurologic deficit progresses. Postural education, avoid heavy lifting, and prolonged cervical flexion. Use cervical roll as recommended, Use of lumbar roll as recommended, Ther-ex: IASTM to affected mm hypertonicities (discussed soreness/ecchymosis up to 36 hrs post procedure); cervical axial retraction, Brueggers position, seated scapular retraction, greater than 15 spent on above mentioned ther-ex. Cervical mobilization/ long axis traction- manual with chin retraction, Thoracic mobilization/manipulation: prone P-A mob, Prone A-P CT manip; Lumbar mobilization- Flexion distraction, Sacrum. L SIJ- Long  axis distraction    HPI  Carlos Irving is a 58 y.o. male  Chief Complaint   Patient presents with   • Neck Pain     Neck pain-3   • Shoulder Pain     Left shoulder pain-3     The patient presents to the office with neck and arm pain that has been an issue for several years. No specific trauma. That patient reports she has L shoulder surgery two years ago. The patient reports two weeks ago he has some pain in the L sided again without specific incident. The patient is an  and now works with [employer]. Less physical than before. Usually lifts weights but work on elastic bands and its helping.   1/22- The patient tolerated traetment well but some HA with last appt but subsided after a day and continued to work out.     Neck Pain   This is a chronic problem. The current episode started more than 1 year ago. The problem occurs 2 to 4 times per day. The problem has been waxing and waning. The pain is associated with lifting a heavy object.   Shoulder Pain   The pain is at a severity of 10/10.     Past Medical History:   Diagnosis Date   • GERD (gastroesophageal reflux disease)    • Pulmonary embolism (HCC)     last assessed 1/10/2013   • Snoring 4/25/2016      Past Surgical History:   Procedure Laterality Date   • [place] SURGICAL ARTHROSCOPY SHOULDER W/ROTATOR CUFF RPR Left 7/29/2022    Procedure: SHOULDER ARTHROSCOPIC ROTATOR CUFF REPAIR BICEPS TENODISIS;  Surgeon: Chino Fragoso MD;  Location: [facility] MAIN OR;  Service: Orthopedics   • TONSILLECTOMY AND ADENOIDECTOMY       The following portions of the patient's history were reviewed and updated as appropriate: allergies, past family history, past medical history, past social history, past surgical history, and problem list.  Review of Systems   Musculoskeletal:  Positive for neck pain.     Physical Exam  Musculoskeletal:         General: Tenderness present.      Cervical back: Rigidity, spasms, tenderness and crepitus present. No swelling, edema,  deformity, erythema, signs of trauma, lacerations, torticollis or bony tenderness. Pain with movement present. Decreased range of motion.      Thoracic back: Spasms and tenderness present. No swelling, edema, deformity, signs of trauma, lacerations or bony tenderness. Decreased range of motion. No scoliosis.      Lumbar back: Spasms and tenderness present. No swelling, edema, deformity, signs of trauma, lacerations or bony tenderness. Decreased range of motion. No scoliosis.        Back:    Neurological:      Gait: Gait is intact.      Deep Tendon Reflexes: Reflexes are normal and symmetric.       SOFT TISSUE ASSESSMENT: Hypertonicity and tenderness palpated B C4-T2 T10-S1, L SIJ Upper trap, levatory scap, L SCM, rhomboids, erector spinae, hip flexor, glute med/min JOINT RESTRICTIONS:C4-T2 T10-Ss. L SIJ ORTHO:Melony repeated flexion peripheralizes, extension centralizes;maximal foraminal compression-neg,cervical distraction- relieving symptoms; Spurlings negative for reproduction of radicular symptoms, SLR- Neg, Sherif- neg, FABERE- neg, Sitting ROOT- neg, SLUMP- Neg for neural tension    Return in about 1 week (around 1/29/2024) for Recheck.

## 2024-02-05 ENCOUNTER — PROCEDURE VISIT (OUTPATIENT)
Age: 59
End: 2024-02-05
Payer: COMMERCIAL

## 2024-02-05 VITALS
HEART RATE: 62 BPM | SYSTOLIC BLOOD PRESSURE: 122 MMHG | HEIGHT: 67 IN | DIASTOLIC BLOOD PRESSURE: 76 MMHG | BODY MASS INDEX: 35 KG/M2 | WEIGHT: 223 LBS | OXYGEN SATURATION: 96 %

## 2024-02-05 DIAGNOSIS — M99.03 SEGMENTAL DYSFUNCTION OF LUMBAR REGION: ICD-10-CM

## 2024-02-05 DIAGNOSIS — M54.59 MECHANICAL LOW BACK PAIN: ICD-10-CM

## 2024-02-05 DIAGNOSIS — M99.01 SEGMENTAL DYSFUNCTION OF CERVICAL REGION: ICD-10-CM

## 2024-02-05 DIAGNOSIS — M99.04 SEGMENTAL DYSFUNCTION OF SACRAL REGION: ICD-10-CM

## 2024-02-05 DIAGNOSIS — M99.02 SEGMENTAL DYSFUNCTION OF THORACIC REGION: Primary | ICD-10-CM

## 2024-02-05 DIAGNOSIS — M54.2 NECK PAIN: ICD-10-CM

## 2024-02-05 PROCEDURE — 98941 CHIROPRACT MANJ 3-4 REGIONS: CPT | Performed by: CHIROPRACTOR

## 2024-02-05 PROCEDURE — 97110 THERAPEUTIC EXERCISES: CPT | Performed by: CHIROPRACTOR

## 2024-02-05 NOTE — PROGRESS NOTES
Diagnoses and all orders for this visit:    Segmental dysfunction of thoracic region    Neck pain    Segmental dysfunction of cervical region    Segmental dysfunction of lumbar region    Segmental dysfunction of sacral region    Mechanical low back pain      ASSESSMENT:  Pt's symptoms and exam findings consistent with mechanical neck pain secondary to repetitive st/sp injury of discogenic origin. Pt responded well to extension biased stretches and manual mobilization of the affected spinal and myofascial tissues with increased ROM; trial of conservative tx recommended consisting on Melony extension biased exercises, graded mobilization/manipulation of the affected tissues, postural/ergonomic education and take home stretches/exercises. If symptoms fail to centralize or neurologic deficit presents, appropriate imaging and referral will be coordinated.  -Pt tolerated treatment well with some improvements in pain and mm spasm    PROCEDURE CODES: 16541 and 63546    TREATMENT:  Fear avoidance behavior discussion, encouraged and reassured pt that natural course of condition is to improve over time with adherence to tx plan and home care strategies. Home care recommendations: walk to tolerance, gradual return to activity to tolerance (avoid anything that peripheralizes symptoms), call if symptoms peripheralize, worsen, or neurologic deficit progresses. Postural education, avoid heavy lifting, and prolonged cervical flexion. Use cervical roll as recommended, Use of lumbar roll as recommended, Ther-ex: IASTM to affected mm hypertonicities (discussed soreness/ecchymosis up to 36 hrs post procedure); cervical axial retraction, Brueggers position, seated scapular retraction, greater than 15 spent on above mentioned ther-ex. Cervical mobilization/ long axis traction- manual with chin retraction, Thoracic mobilization/manipulation: prone P-A mob, Prone A-P CT manip; Lumbar mobilization- Flexion distraction, Sacrum. L SIJ- Long  axis distraction    HPI  Carlos Irving is a 58 y.o. male  Chief Complaint   Patient presents with   • Neck Pain     Neck pain-2   • Shoulder Pain     Shoulder pain-2     The patient presents to the office with neck and arm pain that has been an issue for several years. No specific trauma. That patient reports she has L shoulder surgery two years ago. The patient reports two weeks ago he has some pain in the L sided again without specific incident. The patient is an  and now works with [employer]. Less physical than before. Usually lifts weights but work on elastic bands and its helping.   1/22- The patient tolerated traetment well but some HA with last appt but subsided after a day and continued to work out.   2/5- The patient is feeling a little better since his last visit. Tight in the neck and lower back    Neck Pain   This is a chronic problem. The current episode started more than 1 year ago. The problem occurs 2 to 4 times per day. The problem has been waxing and waning. The pain is associated with lifting a heavy object.   Shoulder Pain   The pain is at a severity of 10/10.     Past Medical History:   Diagnosis Date   • GERD (gastroesophageal reflux disease)    • Pulmonary embolism (HCC)     last assessed 1/10/2013   • Snoring 4/25/2016      Past Surgical History:   Procedure Laterality Date   • [place] SURGICAL ARTHROSCOPY SHOULDER W/ROTATOR CUFF RPR Left 7/29/2022    Procedure: SHOULDER ARTHROSCOPIC ROTATOR CUFF REPAIR BICEPS TENODISIS;  Surgeon: Chino Fragoso MD;  Location: [facility] MAIN OR;  Service: Orthopedics   • TONSILLECTOMY AND ADENOIDECTOMY       The following portions of the patient's history were reviewed and updated as appropriate: allergies, past family history, past medical history, past social history, past surgical history, and problem list.  Review of Systems   Musculoskeletal:  Positive for neck pain.     Physical Exam  Musculoskeletal:         General: Tenderness  present.      Cervical back: Rigidity, spasms, tenderness and crepitus present. No swelling, edema, deformity, erythema, signs of trauma, lacerations, torticollis or bony tenderness. Pain with movement present. Decreased range of motion.      Thoracic back: Spasms and tenderness present. No swelling, edema, deformity, signs of trauma, lacerations or bony tenderness. Decreased range of motion. No scoliosis.      Lumbar back: Spasms and tenderness present. No swelling, edema, deformity, signs of trauma, lacerations or bony tenderness. Decreased range of motion. No scoliosis.        Back:    Neurological:      Gait: Gait is intact.      Deep Tendon Reflexes: Reflexes are normal and symmetric.       SOFT TISSUE ASSESSMENT: Hypertonicity and tenderness palpated B C4-T2 T10-S1, L SIJ Upper trap, levatory scap, L SCM, rhomboids, erector spinae, hip flexor, glute med/min JOINT RESTRICTIONS:C4-T2 T10-Ss. L SIJ ORTHO:Melony repeated flexion peripheralizes, extension centralizes;maximal foraminal compression-neg,cervical distraction- relieving symptoms; Spurlings negative for reproduction of radicular symptoms, SLR- Neg, Sherif- neg, FABERE- neg, Sitting ROOT- neg, SLUMP- Neg for neural tension    Return if symptoms worsen or fail to improve.

## 2024-02-05 NOTE — LETTER
February 5, 2024     Patient: Carlos Irving  YOB: 1965  Date of Visit: 2/5/2024      To Whom it May Concern:    Carlos Irving is under my professional care. Carlos was seen in my office on 2/5/2024. Carlos was seen in our office for treatment of a MSK condition on 2/5/2024.    If you have any questions or concerns, please don't hesitate to call.         Sincerely,          Juanita Nugent DC        CC: No Recipients

## 2024-02-20 ENCOUNTER — ANESTHESIA (OUTPATIENT)
Dept: ANESTHESIOLOGY | Facility: HOSPITAL | Age: 59
End: 2024-02-20

## 2024-02-20 ENCOUNTER — ANESTHESIA EVENT (OUTPATIENT)
Dept: ANESTHESIOLOGY | Facility: HOSPITAL | Age: 59
End: 2024-02-20

## 2024-02-21 PROBLEM — Z13.6 SCREENING FOR CARDIOVASCULAR CONDITION: Status: RESOLVED | Noted: 2018-07-10 | Resolved: 2024-02-21

## 2024-02-21 PROBLEM — Z12.11 SCREENING FOR COLON CANCER: Status: RESOLVED | Noted: 2018-07-10 | Resolved: 2024-02-21

## 2024-02-21 PROBLEM — Z00.00 WELL ADULT EXAM: Status: RESOLVED | Noted: 2018-07-10 | Resolved: 2024-02-21

## 2024-02-21 PROBLEM — Z12.5 SCREENING FOR PROSTATE CANCER: Status: RESOLVED | Noted: 2018-07-10 | Resolved: 2024-02-21

## 2024-02-21 PROBLEM — Z13.1 SCREENING FOR DIABETES MELLITUS: Status: RESOLVED | Noted: 2018-07-10 | Resolved: 2024-02-21

## 2024-02-29 ENCOUNTER — APPOINTMENT (OUTPATIENT)
Dept: LAB | Age: 59
End: 2024-02-29
Payer: COMMERCIAL

## 2024-02-29 DIAGNOSIS — E78.2 MIXED HYPERLIPIDEMIA: ICD-10-CM

## 2024-02-29 DIAGNOSIS — R74.8 ELEVATED LIVER ENZYMES: ICD-10-CM

## 2024-02-29 DIAGNOSIS — E03.9 ACQUIRED HYPOTHYROIDISM: ICD-10-CM

## 2024-02-29 DIAGNOSIS — R73.01 IFG (IMPAIRED FASTING GLUCOSE): ICD-10-CM

## 2024-02-29 LAB
ALBUMIN SERPL BCP-MCNC: 4.1 G/DL (ref 3.5–5)
ALP SERPL-CCNC: 49 U/L (ref 34–104)
ALT SERPL W P-5'-P-CCNC: 30 U/L (ref 7–52)
ANION GAP SERPL CALCULATED.3IONS-SCNC: 7 MMOL/L
AST SERPL W P-5'-P-CCNC: 29 U/L (ref 13–39)
BILIRUB DIRECT SERPL-MCNC: 0.16 MG/DL (ref 0–0.2)
BILIRUB SERPL-MCNC: 0.65 MG/DL (ref 0.2–1)
BUN SERPL-MCNC: 19 MG/DL (ref 5–25)
CALCIUM SERPL-MCNC: 9.3 MG/DL (ref 8.4–10.2)
CHLORIDE SERPL-SCNC: 103 MMOL/L (ref 96–108)
CHOLEST SERPL-MCNC: 105 MG/DL
CO2 SERPL-SCNC: 29 MMOL/L (ref 21–32)
CREAT SERPL-MCNC: 0.88 MG/DL (ref 0.6–1.3)
EST. AVERAGE GLUCOSE BLD GHB EST-MCNC: 126 MG/DL
GFR SERPL CREATININE-BSD FRML MDRD: 93 ML/MIN/1.73SQ M
GLUCOSE P FAST SERPL-MCNC: 103 MG/DL (ref 65–99)
HBA1C MFR BLD: 6 %
HDLC SERPL-MCNC: 32 MG/DL
LDLC SERPL CALC-MCNC: 53 MG/DL (ref 0–100)
POTASSIUM SERPL-SCNC: 4.2 MMOL/L (ref 3.5–5.3)
PROT SERPL-MCNC: 6.9 G/DL (ref 6.4–8.4)
SODIUM SERPL-SCNC: 139 MMOL/L (ref 135–147)
TRIGL SERPL-MCNC: 98 MG/DL
TSH SERPL DL<=0.05 MIU/L-ACNC: 1.14 UIU/ML (ref 0.45–4.5)

## 2024-02-29 PROCEDURE — 80061 LIPID PANEL: CPT

## 2024-02-29 PROCEDURE — 84443 ASSAY THYROID STIM HORMONE: CPT

## 2024-02-29 PROCEDURE — 82248 BILIRUBIN DIRECT: CPT

## 2024-02-29 PROCEDURE — 80053 COMPREHEN METABOLIC PANEL: CPT

## 2024-02-29 PROCEDURE — 83036 HEMOGLOBIN GLYCOSYLATED A1C: CPT

## 2024-02-29 PROCEDURE — 36415 COLL VENOUS BLD VENIPUNCTURE: CPT

## 2024-03-05 ENCOUNTER — ANESTHESIA (OUTPATIENT)
Dept: GASTROENTEROLOGY | Facility: AMBULARY SURGERY CENTER | Age: 59
End: 2024-03-05

## 2024-03-05 ENCOUNTER — ANESTHESIA EVENT (OUTPATIENT)
Dept: GASTROENTEROLOGY | Facility: AMBULARY SURGERY CENTER | Age: 59
End: 2024-03-05

## 2024-03-05 ENCOUNTER — HOSPITAL ENCOUNTER (OUTPATIENT)
Dept: GASTROENTEROLOGY | Facility: AMBULARY SURGERY CENTER | Age: 59
Setting detail: OUTPATIENT SURGERY
Discharge: HOME | End: 2024-03-05
Attending: INTERNAL MEDICINE
Payer: COMMERCIAL

## 2024-03-05 VITALS
HEIGHT: 71 IN | WEIGHT: 209 LBS | HEART RATE: 49 BPM | RESPIRATION RATE: 18 BRPM | BODY MASS INDEX: 29.26 KG/M2 | OXYGEN SATURATION: 97 % | TEMPERATURE: 97.2 F | SYSTOLIC BLOOD PRESSURE: 109 MMHG | DIASTOLIC BLOOD PRESSURE: 73 MMHG

## 2024-03-05 DIAGNOSIS — K62.5 RECTAL BLEEDING: ICD-10-CM

## 2024-03-05 DIAGNOSIS — K21.9 GASTROESOPHAGEAL REFLUX DISEASE WITHOUT ESOPHAGITIS: ICD-10-CM

## 2024-03-05 PROCEDURE — 45385 COLONOSCOPY W/LESION REMOVAL: CPT | Performed by: INTERNAL MEDICINE

## 2024-03-05 PROCEDURE — 43239 EGD BIOPSY SINGLE/MULTIPLE: CPT | Performed by: INTERNAL MEDICINE

## 2024-03-05 PROCEDURE — 88305 TISSUE EXAM BY PATHOLOGIST: CPT | Performed by: STUDENT IN AN ORGANIZED HEALTH CARE EDUCATION/TRAINING PROGRAM

## 2024-03-05 RX ORDER — LIDOCAINE HYDROCHLORIDE 10 MG/ML
INJECTION, SOLUTION EPIDURAL; INFILTRATION; INTRACAUDAL; PERINEURAL AS NEEDED
Status: DISCONTINUED | OUTPATIENT
Start: 2024-03-05 | End: 2024-03-05

## 2024-03-05 RX ORDER — PROPOFOL 10 MG/ML
INJECTION, EMULSION INTRAVENOUS AS NEEDED
Status: DISCONTINUED | OUTPATIENT
Start: 2024-03-05 | End: 2024-03-05

## 2024-03-05 RX ORDER — SODIUM CHLORIDE, SODIUM LACTATE, POTASSIUM CHLORIDE, CALCIUM CHLORIDE 600; 310; 30; 20 MG/100ML; MG/100ML; MG/100ML; MG/100ML
INJECTION, SOLUTION INTRAVENOUS CONTINUOUS PRN
Status: DISCONTINUED | OUTPATIENT
Start: 2024-03-05 | End: 2024-03-05

## 2024-03-05 RX ORDER — FENTANYL CITRATE 50 UG/ML
INJECTION, SOLUTION INTRAMUSCULAR; INTRAVENOUS AS NEEDED
Status: DISCONTINUED | OUTPATIENT
Start: 2024-03-05 | End: 2024-03-05

## 2024-03-05 RX ADMIN — PROPOFOL 25 MG: 10 INJECTION, EMULSION INTRAVENOUS at 10:28

## 2024-03-05 RX ADMIN — PROPOFOL 25 MG: 10 INJECTION, EMULSION INTRAVENOUS at 10:31

## 2024-03-05 RX ADMIN — LIDOCAINE HYDROCHLORIDE 50 MG: 10 INJECTION, SOLUTION EPIDURAL; INFILTRATION; INTRACAUDAL; PERINEURAL at 10:16

## 2024-03-05 RX ADMIN — SODIUM CHLORIDE, SODIUM LACTATE, POTASSIUM CHLORIDE, AND CALCIUM CHLORIDE: .6; .31; .03; .02 INJECTION, SOLUTION INTRAVENOUS at 10:39

## 2024-03-05 RX ADMIN — LIDOCAINE HYDROCHLORIDE 50 MG: 10 INJECTION, SOLUTION EPIDURAL; INFILTRATION; INTRACAUDAL; PERINEURAL at 10:11

## 2024-03-05 RX ADMIN — PROPOFOL 25 MG: 10 INJECTION, EMULSION INTRAVENOUS at 10:20

## 2024-03-05 RX ADMIN — SODIUM CHLORIDE, SODIUM LACTATE, POTASSIUM CHLORIDE, AND CALCIUM CHLORIDE: .6; .31; .03; .02 INJECTION, SOLUTION INTRAVENOUS at 10:01

## 2024-03-05 RX ADMIN — FENTANYL CITRATE 50 MCG: 50 INJECTION INTRAMUSCULAR; INTRAVENOUS at 10:25

## 2024-03-05 RX ADMIN — FENTANYL CITRATE 50 MCG: 50 INJECTION INTRAMUSCULAR; INTRAVENOUS at 10:11

## 2024-03-05 RX ADMIN — PROPOFOL 25 MG: 10 INJECTION, EMULSION INTRAVENOUS at 10:25

## 2024-03-05 RX ADMIN — PROPOFOL 25 MG: 10 INJECTION, EMULSION INTRAVENOUS at 10:18

## 2024-03-05 RX ADMIN — PROPOFOL 25 MG: 10 INJECTION, EMULSION INTRAVENOUS at 10:22

## 2024-03-05 RX ADMIN — PROPOFOL 25 MG: 10 INJECTION, EMULSION INTRAVENOUS at 10:34

## 2024-03-05 RX ADMIN — PROPOFOL 120 MG: 10 INJECTION, EMULSION INTRAVENOUS at 10:16

## 2024-03-05 NOTE — H&P
"History and Physical -  [facility]  Carlos Irving 59 y.o. male MRN: [id number]    HPI: Carlos Irving is a 59 y.o. year old male who presents for evaluation of rectal bleeding and chronic GERD.      Review of Systems    Historical Information   Past Medical History:   Diagnosis Date    Colon polyp     Disease of thyroid gland     GERD (gastroesophageal reflux disease)     Hyperlipidemia     Pulmonary embolism (HCC)     last assessed 1/10/2013    Snoring 2016     Past Surgical History:   Procedure Laterality Date    [place] SURGICAL ARTHROSCOPY SHOULDER W/ROTATOR CUFF RPR Left 2022    Procedure: SHOULDER ARTHROSCOPIC ROTATOR CUFF REPAIR BICEPS TENODISIS;  Surgeon: Chino Fragoso MD;  Location: [facility] MAIN OR;  Service: Orthopedics    TONSILLECTOMY AND ADENOIDECTOMY       Social History   Social History     Substance and Sexual Activity   Alcohol Use Yes    Comment: occ     Social History     Substance and Sexual Activity   Drug Use No     Social History     Tobacco Use   Smoking Status Former    Current packs/day: 0.00    Types: Cigarettes    Quit date:     Years since quittin.1   Smokeless Tobacco Never     Family History   Problem Relation Age of Onset    Diabetes Mother         DM    Hypothyroidism Mother     Glaucoma Mother     Hypertension Mother     Alcohol abuse Sister     Drug abuse Sister     Hypothyroidism Sister     Lung cancer Maternal Grandmother        Meds/Allergies     (Not in a hospital admission)      Allergies   Allergen Reactions    Esomeprazole        Objective     /68   Pulse 56   Temp (!) 96.8 °F (36 °C) (Temporal)   Resp 15   Ht 5' 11\" (1.803 m)   Wt 94.8 kg (209 lb)   SpO2 97%   BMI 29.15 kg/m²       PHYSICAL EXAM    Gen: NAD  CV: RRR  CHEST: Clear  ABD: soft, NT/ND  EXT: no edema  Neuro: AAO      ASSESSMENT/PLAN:  This is a 59 y.o. year old male here for evaluation of chronic GERD and rectal bleeding.    PLAN:   Procedure: EGD and " colonoscopy.

## 2024-03-05 NOTE — ANESTHESIA POSTPROCEDURE EVALUATION
Post-Op Assessment Note    CV Status:  Stable    Pain management: adequate       Mental Status:  Sleepy and lethargic   Hydration Status:  Stable   PONV Controlled:  None   Airway Patency:  Patent     Post Op Vitals Reviewed: Yes    No anethesia notable event occurred.    Staff: AMERICO               /63 (03/05/24 1047)    Temp (!) 97.2 °F (36.2 °C) (03/05/24 1047)    Pulse 55 (03/05/24 1047)   Resp 16 (03/05/24 1047)    SpO2 96 % (03/05/24 1047)

## 2024-03-05 NOTE — ANESTHESIA PREPROCEDURE EVALUATION
Procedure:  EGD  COLONOSCOPY    Relevant Problems   ANESTHESIA (within normal limits)      CARDIO   (+) Hyperlipidemia   (-) Angina at rest   (-) Angina of effort   (-) Chest pain   (-) GIRALDO (dyspnea on exertion)      ENDO   (+) Hypothyroidism   (-) Diabetes mellitus type 1 (HCC)   (-) Diabetes mellitus, type 2 (HCC)      GI/HEPATIC   (+) GERD (gastroesophageal reflux disease)   (+) Rectal bleeding      /RENAL   (-) Chronic kidney disease      MUSCULOSKELETAL   (+) Impingement syndrome of left shoulder      NEURO/PSYCH   (+) Anxiety disorder   (-) CVA (cerebral vascular accident) (HCC)   (-) Seizures (HCC)      PULMONARY   (-) Asthma   (-) Chronic obstructive pulmonary disease (HCC)   (-) Sleep apnea        Physical Exam    Airway    Mallampati score: II  TM Distance: >3 FB  Neck ROM: full     Dental       Cardiovascular      Pulmonary      Other Findings        Anesthesia Plan  ASA Score- 2     Anesthesia Type- IV sedation with anesthesia with ASA Monitors.         Additional Monitors:     Airway Plan:            Plan Factors-Exercise tolerance (METS): >4 METS.    Chart reviewed.    Patient summary reviewed.                  Induction- intravenous.    Postoperative Plan-     Informed Consent- Anesthetic plan and risks discussed with patient.  I personally reviewed this patient with the CRNA. Discussed and agreed on the Anesthesia Plan with the CRNA..

## 2024-03-06 PROCEDURE — 88305 TISSUE EXAM BY PATHOLOGIST: CPT | Performed by: STUDENT IN AN ORGANIZED HEALTH CARE EDUCATION/TRAINING PROGRAM

## 2024-04-29 ENCOUNTER — OFFICE VISIT (OUTPATIENT)
Dept: FAMILY MEDICINE CLINIC | Facility: CLINIC | Age: 59
End: 2024-04-29
Payer: COMMERCIAL

## 2024-04-29 VITALS
WEIGHT: 211 LBS | HEIGHT: 70 IN | RESPIRATION RATE: 16 BRPM | HEART RATE: 67 BPM | OXYGEN SATURATION: 98 % | DIASTOLIC BLOOD PRESSURE: 84 MMHG | BODY MASS INDEX: 30.21 KG/M2 | TEMPERATURE: 98.3 F | SYSTOLIC BLOOD PRESSURE: 130 MMHG

## 2024-04-29 DIAGNOSIS — Z23 ENCOUNTER FOR IMMUNIZATION: ICD-10-CM

## 2024-04-29 DIAGNOSIS — Z12.5 SCREENING FOR PROSTATE CANCER: ICD-10-CM

## 2024-04-29 DIAGNOSIS — Z00.00 ANNUAL PHYSICAL EXAM: Primary | ICD-10-CM

## 2024-04-29 DIAGNOSIS — R73.01 IFG (IMPAIRED FASTING GLUCOSE): ICD-10-CM

## 2024-04-29 PROBLEM — Z47.89 AFTERCARE FOLLOWING SURGERY OF THE MUSCULOSKELETAL SYSTEM: Status: RESOLVED | Noted: 2022-08-01 | Resolved: 2024-04-29

## 2024-04-29 PROCEDURE — 3725F SCREEN DEPRESSION PERFORMED: CPT | Performed by: FAMILY MEDICINE

## 2024-04-29 PROCEDURE — 90471 IMMUNIZATION ADMIN: CPT

## 2024-04-29 PROCEDURE — 90715 TDAP VACCINE 7 YRS/> IM: CPT

## 2024-04-29 PROCEDURE — 99396 PREV VISIT EST AGE 40-64: CPT | Performed by: FAMILY MEDICINE

## 2024-04-29 NOTE — PROGRESS NOTES
ADULT ANNUAL PHYSICAL  [facility]ACT[facility]    NAME: Carlos Irving  AGE: 59 y.o. SEX: male  : 1965     DATE: 2024     Assessment and Plan:     Problem List Items Addressed This Visit     Annual physical exam - Primary     Tdap due today         IFG (impaired fasting glucose)    Relevant Orders    Hemoglobin A1C   Other Visit Diagnoses     Screening for prostate cancer        Relevant Orders    PSA, Total Screen    Encounter for immunization        Relevant Orders    TDAP VACCINE GREATER THAN OR EQUAL TO 6YO IM (Completed)          Immunizations and preventive care screenings were discussed with patient today. Appropriate education was printed on patient's after visit summary.    Discussed risks and benefits of prostate cancer screening. We discussed the controversial history of PSA screening for prostate cancer in the [place] as well as the risk of over detection and over treatment of prostate cancer by way of PSA screening.  The patient understands that PSA blood testing is an imperfect way to screen for prostate cancer and that elevated PSA levels in the blood may also be caused by infection, inflammation, prostatic trauma or manipulation, urological procedures, or by benign prostatic enlargement.        Counseling:  Dental Health: discussed importance of regular tooth brushing, flossing, and dental visits.         Return in about 6 months (around 10/29/2024) for Recheck.     Chief Complaint:     Chief Complaint   Patient presents with   • Physical Exam     Patient being seen for physical       History of Present Illness:     Adult Annual Physical   Patient here for a comprehensive physical exam. The patient reports problems - 2-3 weels ago, back feels tight, has appt with chiropractor, thighted, stopped stretching .    Diet and Physical Activity  Diet/Nutrition: well balanced diet.   Exercise: 5-7 times a week on average.      Depression  Screening  PHQ-2/9 Depression Screening    Little interest or pleasure in doing things: 0 - not at all  Feeling down, depressed, or hopeless: 0 - not at all  PHQ-2 Score: 0  PHQ-2 Interpretation: Negative depression screen       General Health  Sleep: sleeps well.   Hearing: normal - bilateral.  Vision: no vision problems.   Dental: regular dental visits.        Health  Symptoms include: none    Advanced Care Planning  Do you have an advanced directive? no  Do you have a durable medical power of ? no  ACP document given to patient? no     Review of Systems:     Review of Systems   Constitutional: Negative.    HENT: Negative.     Eyes: Negative.    Respiratory: Negative.     Cardiovascular: Negative.    Gastrointestinal: Negative.    Endocrine: Negative.    Genitourinary: Negative.    Musculoskeletal:  Positive for back pain.   Allergic/Immunologic: Negative.    Neurological: Negative.    Hematological: Negative.    Psychiatric/Behavioral: Negative.        Past Medical History:     Past Medical History:   Diagnosis Date   • Colon polyp    • Disease of thyroid gland    • GERD (gastroesophageal reflux disease)    • Hyperlipidemia    • Pulmonary embolism (HCC)     last assessed 1/10/2013   • Snoring 04/25/2016      Past Surgical History:     Past Surgical History:   Procedure Laterality Date   • ID SURGICAL ARTHROSCOPY SHOULDER W/ROTATOR CUFF RPR Left 7/29/2022    Procedure: SHOULDER ARTHROSCOPIC ROTATOR CUFF REPAIR BICEPS TENODISIS;  Surgeon: Chino Fragoso MD;  Location: [facility] OR;  Service: Orthopedics   • TONSILLECTOMY AND ADENOIDECTOMY        Family History:     Family History   Problem Relation Age of Onset   • Diabetes Mother         DM   • Hypothyroidism Mother    • Glaucoma Mother    • Hypertension Mother    • Alcohol abuse Sister    • Drug abuse Sister    • Hypothyroidism Sister    • Lung cancer Maternal Grandmother       Social History:     Social History     Socioeconomic History   •  Marital status: /Civil Union     Spouse name: None   • Number of children: 3   • Years of education: None   • Highest education level: None   Occupational History   • Occupation:      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   Tobacco Use   • Smoking status: Former     Current packs/day: 0.00     Types: Cigarettes     Quit date:      Years since quittin.3     Passive exposure: Past   • Smokeless tobacco: Never   Vaping Use   • Vaping status: Former   • Substances: CBD, Flavoring   Substance and Sexual Activity   • Alcohol use: Yes     Comment: occ   • Drug use: No   • Sexual activity: Yes     Partners: Female   Other Topics Concern   • None   Social History Narrative    Recreational activities [employer]      Social Determinants of Health     Financial Resource Strain: Not on file   Food Insecurity: Not on file   Transportation Needs: Not on file   Physical Activity: Not on file   Stress: Not on file   Social Connections: Not on file   Intimate Partner Violence: Not on file   Housing Stability: Not on file      Current Medications:     Current Outpatient Medications   Medication Sig Dispense Refill   • ALPRAZolam (XANAX) 0.25 mg tablet TAKE 1+1/2 TABLETS BY MOUTH AT BEDTIME 45 tablet 5   • aspirin (ECOTRIN LOW STRENGTH) 81 mg EC tablet Take 81 mg by mouth daily     • Co Q-10 50 MG Take 50 mg by mouth     • levothyroxine 112 mcg tablet Take 1 tablet (112 mcg total) by mouth daily in the early morning 90 tablet 1   • Multiple Vitamins-Minerals (CENTRUM SILVER 50+MEN PO) Take 1 tablet by mouth daily     • omeprazole (PriLOSEC) 40 MG capsule TAKE 1 CAPSULE BY MOUTH DAILY 90 capsule 1   • rosuvastatin (CRESTOR) 20 MG tablet TAKE 1 TABLET BY MOUTH DAILY 90 tablet 1     No current facility-administered medications for this visit.      Allergies:     Allergies   Allergen Reactions   • Esomeprazole       Physical Exam:     /84 (BP Location: Left arm, Patient Position:  "Sitting, Cuff Size: Large)   Pulse 67   Temp 98.3 °F (36.8 °C) (Tympanic)   Resp 16   Ht 5' 10.16\" (1.782 m)   Wt 95.7 kg (211 lb)   SpO2 98%   BMI 30.14 kg/m²     Physical Exam  Vitals and nursing note reviewed.   Constitutional:       Appearance: Normal appearance. He is well-developed.   HENT:      Head: Normocephalic and atraumatic.      Right Ear: Tympanic membrane, ear canal and external ear normal.      Left Ear: Tympanic membrane, ear canal and external ear normal.      Nose: Nose normal.   Eyes:      Extraocular Movements: Extraocular movements intact.      Conjunctiva/sclera: Conjunctivae normal.      Pupils: Pupils are equal, round, and reactive to light.   Cardiovascular:      Rate and Rhythm: Normal rate and regular rhythm.      Heart sounds: Normal heart sounds.   Pulmonary:      Effort: Pulmonary effort is normal.      Breath sounds: Normal breath sounds.   Abdominal:      General: Abdomen is flat. Bowel sounds are normal.      Palpations: Abdomen is soft.   Musculoskeletal:         General: Normal range of motion.      Cervical back: Normal range of motion and neck supple.   Skin:     General: Skin is warm and dry.      Capillary Refill: Capillary refill takes less than 2 seconds.   Neurological:      General: No focal deficit present.      Mental Status: He is alert and oriented to person, place, and time.   Psychiatric:         Mood and Affect: Mood normal.         Behavior: Behavior normal.         Thought Content: Thought content normal.         Judgment: Judgment normal.          Joy Reyes DO  [facility]    "

## 2024-04-30 ENCOUNTER — PROCEDURE VISIT (OUTPATIENT)
Age: 59
End: 2024-04-30
Payer: COMMERCIAL

## 2024-04-30 VITALS
HEART RATE: 89 BPM | WEIGHT: 211 LBS | HEIGHT: 70 IN | SYSTOLIC BLOOD PRESSURE: 160 MMHG | DIASTOLIC BLOOD PRESSURE: 92 MMHG | BODY MASS INDEX: 30.21 KG/M2

## 2024-04-30 DIAGNOSIS — M54.2 NECK PAIN: ICD-10-CM

## 2024-04-30 DIAGNOSIS — M99.03 SEGMENTAL DYSFUNCTION OF LUMBAR REGION: ICD-10-CM

## 2024-04-30 DIAGNOSIS — M99.02 SEGMENTAL DYSFUNCTION OF THORACIC REGION: Primary | ICD-10-CM

## 2024-04-30 DIAGNOSIS — M54.59 MECHANICAL LOW BACK PAIN: ICD-10-CM

## 2024-04-30 DIAGNOSIS — M99.04 SEGMENTAL DYSFUNCTION OF SACRAL REGION: ICD-10-CM

## 2024-04-30 DIAGNOSIS — M99.01 SEGMENTAL DYSFUNCTION OF CERVICAL REGION: ICD-10-CM

## 2024-04-30 PROCEDURE — 98941 CHIROPRACT MANJ 3-4 REGIONS: CPT | Performed by: CHIROPRACTOR

## 2024-04-30 PROCEDURE — 97110 THERAPEUTIC EXERCISES: CPT | Performed by: CHIROPRACTOR

## 2024-04-30 NOTE — LETTER
April 30, 2024     Patient: Carlos Irving  YOB: 1965  Date of Visit: 4/30/2024      To Whom it May Concern:    Carlos Irving is under my professional care. Carlos was seen in my office on 4/30/2024. Carlos may return to work on 4/30/24.  Pt was there this morning for treatment.    If you have any questions or concerns, please don't hesitate to call.         Sincerely,          Juanita Nugent DC        CC: No Recipients

## 2024-04-30 NOTE — PROGRESS NOTES
Diagnoses and all orders for this visit:    Segmental dysfunction of thoracic region    Neck pain    Segmental dysfunction of cervical region    Segmental dysfunction of lumbar region    Segmental dysfunction of sacral region    Mechanical low back pain      ASSESSMENT:  Pt's symptoms and exam findings consistent with mechanical neck pain secondary to repetitive st/sp injury of discogenic origin. Pt responded well to extension biased stretches and manual mobilization of the affected spinal and myofascial tissues with increased ROM; trial of conservative tx recommended consisting on Melony extension biased exercises, graded mobilization/manipulation of the affected tissues, postural/ergonomic education and take home stretches/exercises. If symptoms fail to centralize or neurologic deficit presents, appropriate imaging and referral will be coordinated.  -Pt has exacerbation of neck pain without red flags. Tolerated treatment well with some improvements in pain and mm spasm    PROCEDURE CODES: 79423 and 98466    TREATMENT:  Fear avoidance behavior discussion, encouraged and reassured pt that natural course of condition is to improve over time with adherence to tx plan and home care strategies. Home care recommendations: walk to tolerance, gradual return to activity to tolerance (avoid anything that peripheralizes symptoms), call if symptoms peripheralize, worsen, or neurologic deficit progresses. Postural education, avoid heavy lifting, and prolonged cervical flexion. Use cervical roll as recommended, Use of lumbar roll as recommended, Ther-ex: IASTM to affected mm hypertonicities (discussed soreness/ecchymosis up to 36 hrs post procedure); cervical axial retraction, Brueggers position, seated scapular retraction, greater than 15 spent on above mentioned ther-ex. Cervical mobilization/ long axis traction- manual with chin retraction, Thoracic mobilization/manipulation: prone P-A mob, Prone A-P CT manip; Lumbar  mobilization- Flexion distraction, Sacrum. L SIJ- Long axis distraction    HPI  Carlos Irving is a 59 y.o. male  Chief Complaint   Patient presents with   • Neck - Pain     Neck pain that radiates down left shoulder with pins and needles and sharp pain. Pain score 9   • Back Pain     Lower lumbar pain is tight      Pain score 5     The patient presents to the office with neck and arm pain that has been an issue for several years. No specific trauma. That patient reports she has L shoulder surgery two years ago. The patient reports two weeks ago he has some pain in the L sided again without specific incident. The patient is an  and now works with [employer]. Less physical than before. Usually lifts weights but work on elastic bands and its helping.   1/22- The patient tolerated traetment well but some HA with last appt but subsided after a day and continued to work out.   2/5- The patient is feeling a little better since his last visit. Tight in the neck and lower back  4/30- The patient presents to the office with increase in pain into the base of the neck that feels sharp and a little into the L shoulder. The patient was exercises in his normal way and there was no trauma. No pain in to the UE.    Neck Pain   This is a chronic problem. The current episode started more than 1 year ago. The problem occurs 2 to 4 times per day. The problem has been waxing and waning. The pain is associated with lifting a heavy object.   Shoulder Pain   The pain is at a severity of 10/10.   Back Pain      Past Medical History:   Diagnosis Date   • Colon polyp    • Disease of thyroid gland    • GERD (gastroesophageal reflux disease)    • Hyperlipidemia    • Pulmonary embolism (HCC)     last assessed 1/10/2013   • Snoring 04/25/2016      Past Surgical History:   Procedure Laterality Date   • CT SURGICAL ARTHROSCOPY SHOULDER W/ROTATOR CUFF RPR Left 7/29/2022    Procedure: SHOULDER ARTHROSCOPIC ROTATOR CUFF REPAIR BICEPS  TENODISIS;  Surgeon: Chino Fragoso MD;  Location[facility];  Service: Orthopedics   • TONSILLECTOMY AND ADENOIDECTOMY       The following portions of the patient's history were reviewed and updated as appropriate: allergies, past family history, past medical history, past social history, past surgical history, and problem list.  Review of Systems   Musculoskeletal:  Positive for back pain and neck pain.     Physical Exam  Musculoskeletal:         General: Tenderness present.      Cervical back: Rigidity, spasms, tenderness and crepitus present. No swelling, edema, deformity, erythema, signs of trauma, lacerations, torticollis or bony tenderness. Pain with movement present. Decreased range of motion.      Thoracic back: Spasms and tenderness present. No swelling, edema, deformity, signs of trauma, lacerations or bony tenderness. Decreased range of motion. No scoliosis.      Lumbar back: Spasms and tenderness present. No swelling, edema, deformity, signs of trauma, lacerations or bony tenderness. Decreased range of motion. No scoliosis.        Back:    Neurological:      Gait: Gait is intact.      Deep Tendon Reflexes: Reflexes are normal and symmetric.       SOFT TISSUE ASSESSMENT: Hypertonicity and tenderness palpated B C4-T2 T10-S1, L SIJ Upper trap, levatory scap, L SCM, rhomboids, erector spinae, hip flexor, glute med/min JOINT RESTRICTIONS:C4-T2 T10-Ss. L SIJ ORTHO:Melony repeated flexion peripheralizes, extension centralizes;maximal foraminal compression-neg,cervical distraction- relieving symptoms; Spurlings negative for reproduction of radicular symptoms, SLR- Neg, Sherif- neg, FABERE- neg, Sitting ROOT- neg, SLUMP- Neg for neural tension    Return in about 1 week (around 5/7/2024) for Recheck.

## 2024-05-14 ENCOUNTER — PROCEDURE VISIT (OUTPATIENT)
Age: 59
End: 2024-05-14
Payer: COMMERCIAL

## 2024-05-14 VITALS
DIASTOLIC BLOOD PRESSURE: 77 MMHG | HEIGHT: 70 IN | HEART RATE: 68 BPM | SYSTOLIC BLOOD PRESSURE: 128 MMHG | BODY MASS INDEX: 30.21 KG/M2 | WEIGHT: 211 LBS

## 2024-05-14 DIAGNOSIS — M99.03 SEGMENTAL DYSFUNCTION OF LUMBAR REGION: ICD-10-CM

## 2024-05-14 DIAGNOSIS — M99.01 SEGMENTAL DYSFUNCTION OF CERVICAL REGION: ICD-10-CM

## 2024-05-14 DIAGNOSIS — M54.59 MECHANICAL LOW BACK PAIN: ICD-10-CM

## 2024-05-14 DIAGNOSIS — M99.04 SEGMENTAL DYSFUNCTION OF SACRAL REGION: ICD-10-CM

## 2024-05-14 DIAGNOSIS — M54.2 NECK PAIN: ICD-10-CM

## 2024-05-14 DIAGNOSIS — M99.02 SEGMENTAL DYSFUNCTION OF THORACIC REGION: Primary | ICD-10-CM

## 2024-05-14 PROCEDURE — 98941 CHIROPRACT MANJ 3-4 REGIONS: CPT | Performed by: CHIROPRACTOR

## 2024-05-14 PROCEDURE — 97110 THERAPEUTIC EXERCISES: CPT | Performed by: CHIROPRACTOR

## 2024-05-14 NOTE — PROGRESS NOTES
Diagnoses and all orders for this visit:    Segmental dysfunction of thoracic region    Neck pain    Segmental dysfunction of cervical region    Segmental dysfunction of lumbar region    Segmental dysfunction of sacral region    Mechanical low back pain      ASSESSMENT:  Pt's symptoms and exam findings consistent with mechanical neck pain secondary to repetitive st/sp injury of discogenic origin. Pt responded well to extension biased stretches and manual mobilization of the affected spinal and myofascial tissues with increased ROM; trial of conservative tx recommended consisting on Melony extension biased exercises, graded mobilization/manipulation of the affected tissues, postural/ergonomic education and take home stretches/exercises. If symptoms fail to centralize or neurologic deficit presents, appropriate imaging and referral will be coordinated.  -Pt has exacerbation of neck pain without red flags. Tolerated treatment well with some improvements in pain and mm spasm    PROCEDURE CODES: 00618 and 95631    TREATMENT:  Fear avoidance behavior discussion, encouraged and reassured pt that natural course of condition is to improve over time with adherence to tx plan and home care strategies. Home care recommendations: walk to tolerance, gradual return to activity to tolerance (avoid anything that peripheralizes symptoms), call if symptoms peripheralize, worsen, or neurologic deficit progresses. Postural education, avoid heavy lifting, and prolonged cervical flexion. Use cervical roll as recommended, Use of lumbar roll as recommended, Ther-ex: IASTM to affected mm hypertonicities (discussed soreness/ecchymosis up to 36 hrs post procedure); cervical axial retraction, Brueggers position, seated scapular retraction, greater than 15 spent on above mentioned ther-ex. Cervical mobilization/ long axis traction- manual with chin retraction, Thoracic mobilization/manipulation: prone P-A mob, Prone A-P CT manip; Lumbar  mobilization- Flexion distraction, Sacrum. L SIJ- Long axis distraction    HPI  Carlos Irving is a 59 y.o. male  Chief Complaint   Patient presents with   • Neck Pain     Right side an 8 left side 6    • Back Pain     Low back a little tight about a 5 bilateral      The patient presents to the office with neck and arm pain that has been an issue for several years. No specific trauma. That patient reports she has L shoulder surgery two years ago. The patient reports two weeks ago he has some pain in the L sided again without specific incident. The patient is an  and now works with [employer]. Less physical than before. Usually lifts weights but work on elastic bands and its helping.   1/22- The patient tolerated traetment well but some HA with last appt but subsided after a day and continued to work out.   2/5- The patient is feeling a little better since his last visit. Tight in the neck and lower back  4/30- The patient presents to the office with increase in pain into the base of the neck that feels sharp and a little into the L shoulder. The patient was exercises in his normal way and there was no trauma. No pain in to the UE.  5/14- The pt mentions his neck was improving but then a few days ago was trying to stretch and then pulled a muscle and then had a hard time turning.    Neck Pain   This is a chronic problem. The current episode started more than 1 year ago. The problem occurs 2 to 4 times per day. The problem has been waxing and waning. The pain is associated with lifting a heavy object.   Shoulder Pain   The pain is at a severity of 10/10.   Back Pain      Past Medical History:   Diagnosis Date   • Colon polyp    • Disease of thyroid gland    • GERD (gastroesophageal reflux disease)    • Hyperlipidemia    • Pulmonary embolism (HCC)     last assessed 1/10/2013   • Snoring 04/25/2016      Past Surgical History:   Procedure Laterality Date   • [place] SURGICAL ARTHROSCOPY SHOULDER W/ROTATOR CUFF  RPR Left 7/29/2022    Procedure: SHOULDER ARTHROSCOPIC ROTATOR CUFF REPAIR BICEPS TENODISIS;  Surgeon: Chino Fragoso MD;  Location: [facility];  Service: Orthopedics   • TONSILLECTOMY AND ADENOIDECTOMY       The following portions of the patient's history were reviewed and updated as appropriate: allergies, past family history, past medical history, past social history, past surgical history, and problem list.  Review of Systems   Musculoskeletal:  Positive for back pain and neck pain.     Physical Exam  Musculoskeletal:         General: Tenderness present.      Cervical back: Rigidity, spasms, tenderness and crepitus present. No swelling, edema, deformity, erythema, signs of trauma, lacerations, torticollis or bony tenderness. Pain with movement present. Decreased range of motion.      Thoracic back: Spasms and tenderness present. No swelling, edema, deformity, signs of trauma, lacerations or bony tenderness. Decreased range of motion. No scoliosis.      Lumbar back: Spasms and tenderness present. No swelling, edema, deformity, signs of trauma, lacerations or bony tenderness. Decreased range of motion. No scoliosis.        Back:    Neurological:      Gait: Gait is intact.      Deep Tendon Reflexes: Reflexes are normal and symmetric.       SOFT TISSUE ASSESSMENT: Hypertonicity and tenderness palpated B C4-T2 T10-S1, L SIJ Upper trap, levatory scap, L SCM, rhomboids, erector spinae, hip flexor, glute med/min JOINT RESTRICTIONS:C4-T2 T10-Ss. L SIJ ORTHO:Melony repeated flexion peripheralizes, extension centralizes;maximal foraminal compression-neg,cervical distraction- relieving symptoms; Spurlings negative for reproduction of radicular symptoms, SLR- Neg, Sherif- neg, FABERE- neg, Sitting ROOT- neg, SLUMP- Neg for neural tension    Return in about 1 week (around 5/21/2024) for Recheck.

## 2024-05-29 DIAGNOSIS — F41.9 ANXIETY: ICD-10-CM

## 2024-05-29 DIAGNOSIS — E78.2 MIXED HYPERLIPIDEMIA: ICD-10-CM

## 2024-05-29 DIAGNOSIS — R12 HEARTBURN: ICD-10-CM

## 2024-05-30 ENCOUNTER — PROCEDURE VISIT (OUTPATIENT)
Age: 59
End: 2024-05-30
Payer: COMMERCIAL

## 2024-05-30 VITALS — BODY MASS INDEX: 30.21 KG/M2 | HEIGHT: 70 IN | WEIGHT: 211 LBS

## 2024-05-30 DIAGNOSIS — M54.59 MECHANICAL LOW BACK PAIN: ICD-10-CM

## 2024-05-30 DIAGNOSIS — M99.02 SEGMENTAL DYSFUNCTION OF THORACIC REGION: Primary | ICD-10-CM

## 2024-05-30 DIAGNOSIS — M54.2 NECK PAIN: ICD-10-CM

## 2024-05-30 DIAGNOSIS — M99.01 SEGMENTAL DYSFUNCTION OF CERVICAL REGION: ICD-10-CM

## 2024-05-30 DIAGNOSIS — M99.03 SEGMENTAL DYSFUNCTION OF LUMBAR REGION: ICD-10-CM

## 2024-05-30 DIAGNOSIS — M99.04 SEGMENTAL DYSFUNCTION OF SACRAL REGION: ICD-10-CM

## 2024-05-30 PROCEDURE — 98941 CHIROPRACT MANJ 3-4 REGIONS: CPT | Performed by: CHIROPRACTOR

## 2024-05-30 PROCEDURE — 97110 THERAPEUTIC EXERCISES: CPT | Performed by: CHIROPRACTOR

## 2024-05-30 RX ORDER — OMEPRAZOLE 40 MG/1
CAPSULE, DELAYED RELEASE ORAL
Qty: 90 CAPSULE | Refills: 1 | Status: SHIPPED | OUTPATIENT
Start: 2024-05-30

## 2024-05-30 RX ORDER — ALPRAZOLAM 0.25 MG/1
TABLET ORAL
Qty: 45 TABLET | Refills: 3 | Status: SHIPPED | OUTPATIENT
Start: 2024-05-30

## 2024-05-30 RX ORDER — ROSUVASTATIN CALCIUM 20 MG/1
TABLET, COATED ORAL
Qty: 90 TABLET | Refills: 1 | Status: SHIPPED | OUTPATIENT
Start: 2024-05-30

## 2024-05-30 NOTE — PROGRESS NOTES
Diagnoses and all orders for this visit:    Segmental dysfunction of thoracic region    Neck pain    Segmental dysfunction of cervical region    Segmental dysfunction of lumbar region    Segmental dysfunction of sacral region    Mechanical low back pain      ASSESSMENT:  Pt's symptoms and exam findings consistent with mechanical neck pain secondary to repetitive st/sp injury of discogenic origin. Pt responded well to extension biased stretches and manual mobilization of the affected spinal and myofascial tissues with increased ROM; trial of conservative tx recommended consisting on Melony extension biased exercises, graded mobilization/manipulation of the affected tissues, postural/ergonomic education and take home stretches/exercises. If symptoms fail to centralize or neurologic deficit presents, appropriate imaging and referral will be coordinated.  -Pt has exacerbation of back pain without red flags. Tolerated treatment well with some improvements in pain and mm spasm    PROCEDURE CODES: 37861 and 28677    TREATMENT:  Fear avoidance behavior discussion, encouraged and reassured pt that natural course of condition is to improve over time with adherence to tx plan and home care strategies. Home care recommendations: walk to tolerance, gradual return to activity to tolerance (avoid anything that peripheralizes symptoms), call if symptoms peripheralize, worsen, or neurologic deficit progresses. Postural education, avoid heavy lifting, and prolonged cervical flexion. Use cervical roll as recommended, Use of lumbar roll as recommended, Ther-ex: IASTM to affected mm hypertonicities (discussed soreness/ecchymosis up to 36 hrs post procedure); cervical axial retraction, Brueggers position, seated scapular retraction, greater than 15 spent on above mentioned ther-ex. Cervical mobilization/ long axis traction- manual with chin retraction, Thoracic mobilization/manipulation: prone P-A mob, Prone A-P CT manip; Lumbar  mobilization- Flexion distraction, Sacrum. L SIJ- Long axis distraction    HPI  Carlos Irving is a 59 y.o. male  Chief Complaint   Patient presents with   • Neck Pain     About a 6 left side right side 7    • Back Pain     Low back and left leg about a 4      The patient presents to the office with neck and arm pain that has been an issue for several years. No specific trauma. That patient reports she has L shoulder surgery two years ago. The patient reports two weeks ago he has some pain in the L sided again without specific incident. The patient is an  and now works with [employer]. Less physical than before. Usually lifts weights but work on elastic bands and its helping.   1/22- The patient tolerated traetment well but some HA with last appt but subsided after a day and continued to work out.   2/5- The patient is feeling a little better since his last visit. Tight in the neck and lower back  4/30- The patient presents to the office with increase in pain into the base of the neck that feels sharp and a little into the L shoulder. The patient was exercises in his normal way and there was no trauma. No pain in to the UE.  5/14- The pt mentions his neck was improving but then a few days ago was trying to stretch and then pulled a muscle and then had a hard time turning.  5/30- Flare up of L sided pain and lateral hip pain after throwing around rocks at his camp site.     Neck Pain   This is a chronic problem. The current episode started more than 1 year ago. The problem occurs 2 to 4 times per day. The problem has been waxing and waning. The pain is associated with lifting a heavy object.   Shoulder Pain   The pain is at a severity of 10/10.   Back Pain      Past Medical History:   Diagnosis Date   • Colon polyp    • Disease of thyroid gland    • GERD (gastroesophageal reflux disease)    • Hyperlipidemia    • Pulmonary embolism (HCC)     last assessed 1/10/2013   • Snoring 04/25/2016      Past  Surgical History:   Procedure Laterality Date   • [place] SURGICAL ARTHROSCOPY SHOULDER W/ROTATOR CUFF RPR Left 7/29/2022    Procedure: SHOULDER ARTHROSCOPIC ROTATOR CUFF REPAIR BICEPS TENODISIS;  Surgeon: Chino Fragoso MD;  [facility];  Service: Orthopedics   • TONSILLECTOMY AND ADENOIDECTOMY       The following portions of the patient's history were reviewed and updated as appropriate: allergies, past family history, past medical history, past social history, past surgical history, and problem list.  Review of Systems   Musculoskeletal:  Positive for back pain and neck pain.     Physical Exam  Musculoskeletal:         General: Tenderness present.      Cervical back: Rigidity, spasms, tenderness and crepitus present. No swelling, edema, deformity, erythema, signs of trauma, lacerations, torticollis or bony tenderness. Pain with movement present. Decreased range of motion.      Thoracic back: Spasms and tenderness present. No swelling, edema, deformity, signs of trauma, lacerations or bony tenderness. Decreased range of motion. No scoliosis.      Lumbar back: Spasms and tenderness present. No swelling, edema, deformity, signs of trauma, lacerations or bony tenderness. Decreased range of motion. No scoliosis.        Back:    Neurological:      Gait: Gait is intact.      Deep Tendon Reflexes: Reflexes are normal and symmetric.       SOFT TISSUE ASSESSMENT: Hypertonicity and tenderness palpated B C4-T2 T10-S1, L SIJ Upper trap, levatory scap, L SCM, rhomboids, erector spinae, hip flexor, glute med/min JOINT RESTRICTIONS:C4-T2 T10-Ss. L SIJ ORTHO:Melony repeated flexion peripheralizes, extension centralizes;maximal foraminal compression-neg,cervical distraction- relieving symptoms; Spurlings negative for reproduction of radicular symptoms, SLR- Neg, Sherif- neg, FABERE- neg, Sitting ROOT- neg, SLUMP- Neg for neural tension    Return in about 2 weeks (around 6/13/2024) for Recheck.

## 2024-05-30 NOTE — TELEPHONE ENCOUNTER
Medication:  PDMP     05/01/2024 11/07/2023 ALPRAZolam (Tablet) 45.0 30 0.25 MG NA LUIS MANUEL URBINA     Active agreement on file -No

## 2024-06-10 ENCOUNTER — OFFICE VISIT (OUTPATIENT)
Dept: FAMILY MEDICINE CLINIC | Facility: CLINIC | Age: 59
End: 2024-06-10
Payer: COMMERCIAL

## 2024-06-10 VITALS
SYSTOLIC BLOOD PRESSURE: 130 MMHG | TEMPERATURE: 97.8 F | HEIGHT: 70 IN | HEART RATE: 68 BPM | WEIGHT: 217.8 LBS | OXYGEN SATURATION: 98 % | BODY MASS INDEX: 31.18 KG/M2 | DIASTOLIC BLOOD PRESSURE: 80 MMHG | RESPIRATION RATE: 17 BRPM

## 2024-06-10 DIAGNOSIS — S20.361A TICK BITE OF RIGHT FRONT WALL OF THORAX, INITIAL ENCOUNTER: Primary | ICD-10-CM
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
## 2024-06-10 PROCEDURE — 99213 OFFICE O/P EST LOW 20 MIN: CPT | Performed by: NURSE PRACTITIONER

## 2024-06-10 NOTE — PROGRESS NOTES
FAMILY PRACTICE OFFICE VISIT       NAME: Carlos Irving  AGE: 59 y.o. SEX: male       : 1965        MRN: [id number]    DATE: 6/15/2024  TIME: 8:28 AM    Assessment and Plan   1. Tick bite of right front wall of thorax, initial encounter  Assessment & Plan:  Labs now and in one month    Orders:  -     Lyme Total AB W Reflex to IGM/IGG; Future  -     Lyme Total AB W Reflex to IGM/IGG; Future; Expected date: 07/10/2024   Monitor for any s&s of lyme      There are no Patient Instructions on file for this visit.        Chief Complaint     Chief Complaint   Patient presents with    Tick Bite     Patient being seen for Tick Bite x 1 week-Bullseye  rash abdomen, and back       History of Present Illness   Carlos Irving is a 59 y.o.-year-old male who is here for f/u to tick bite  Pulled tick out of front of chest on Tuesday  On body more than 24 hours  Then found bulls eye rash next day  Also bulls eye rash on thorax as well    No symptoms of body aches of pain, no fever    Review of Systems   Review of Systems   Constitutional:  Negative for fatigue and fever.   HENT:  Negative for congestion, postnasal drip and rhinorrhea.    Eyes:  Negative for photophobia and visual disturbance.   Respiratory:  Negative for cough, shortness of breath and wheezing.    Cardiovascular:  Negative for chest pain and palpitations.   Gastrointestinal:  Negative for constipation, diarrhea, nausea and vomiting.   Musculoskeletal:  Negative for arthralgias and myalgias.   Skin:  Positive for rash.   Neurological:  Negative for dizziness, light-headedness and headaches.   Hematological:  Negative for adenopathy.   Psychiatric/Behavioral:  Negative for dysphoric mood and suicidal ideas. The patient is not nervous/anxious.        Active Problem List     Patient Active Problem List   Diagnosis    Anxiety disorder    GERD (gastroesophageal reflux disease)    Hyperlipidemia    Hypothyroidism    Insomnia    Seborrheic keratosis    Umbilical  hernia    Muscle spasm    Impingement syndrome of left shoulder    Radiculopathy, cervical region    Internal derangement of shoulder, left    Tear of left supraspinatus tendon    Rectal bleeding    Annual physical exam    IFG (impaired fasting glucose)    Elevated liver enzymes    Tick bite of right front wall of thorax         Past Medical History:  Past Medical History:   Diagnosis Date    Colon polyp     Disease of thyroid gland     GERD (gastroesophageal reflux disease)     Hyperlipidemia     Pulmonary embolism (HCC)     last assessed 1/10/2013    Snoring 2016       Past Surgical History:  Past Surgical History:   Procedure Laterality Date    NM SURGICAL ARTHROSCOPY SHOULDER W/ROTATOR CUFF RPR Left 2022    Procedure: SHOULDER ARTHROSCOPIC ROTATOR CUFF REPAIR BICEPS TENODISIS;  Surgeon: Chino Fragoso MD;  Location: [facility] MAIN OR;  Service: Orthopedics    TONSILLECTOMY AND ADENOIDECTOMY         Family History:  Family History   Problem Relation Age of Onset    Diabetes Mother         DM    Hypothyroidism Mother     Glaucoma Mother     Hypertension Mother     Alcohol abuse Sister     Drug abuse Sister     Hypothyroidism Sister     Lung cancer Maternal Grandmother        Social History:  Social History     Socioeconomic History    Marital status: /Civil Union     Spouse name: Not on file    Number of children: 3    Years of education: Not on file    Highest education level: Not on file   Occupational History    Occupation:      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   Tobacco Use    Smoking status: Former     Current packs/day: 0.00     Types: Cigarettes     Quit date:      Years since quittin.4     Passive exposure: Past    Smokeless tobacco: Never   Vaping Use    Vaping status: Former    Substances: CBD, Flavoring   Substance and Sexual Activity    Alcohol use: Yes     Comment: occ    Drug use: No    Sexual activity: Yes     Partners: Female    Other Topics Concern    Not on file   Social History Narrative    Recreational activities martial arts      Social Determinants of Health     Financial Resource Strain: Not on file   Food Insecurity: Not on file   Transportation Needs: Not on file   Physical Activity: Not on file   Stress: Not on file   Social Connections: Not on file   Intimate Partner Violence: Not on file   Housing Stability: Not on file       Objective     Vitals:    06/10/24 1356   BP: 130/80   Pulse: 68   Resp: 17   Temp: 97.8 °F (36.6 °C)   SpO2: 98%     Wt Readings from Last 3 Encounters:   06/10/24 98.8 kg (217 lb 12.8 oz)   05/30/24 95.7 kg (211 lb)   05/14/24 95.7 kg (211 lb)       Physical Exam  Vitals and nursing note reviewed.   Constitutional:       Appearance: Normal appearance.   HENT:      Head: Normocephalic and atraumatic.   Eyes:      Conjunctiva/sclera: Conjunctivae normal.   Cardiovascular:      Rate and Rhythm: Normal rate and regular rhythm.      Heart sounds: Normal heart sounds.   Pulmonary:      Effort: Pulmonary effort is normal.      Breath sounds: Normal breath sounds.   Musculoskeletal:         General: Normal range of motion.      Cervical back: Normal range of motion.   Skin:     General: Skin is warm and dry.      Capillary Refill: Capillary refill takes less than 2 seconds.      Comments: 2cm bulls eye rash on chest and thorax     Neurological:      General: No focal deficit present.      Mental Status: He is alert and oriented to person, place, and time.   Psychiatric:         Mood and Affect: Mood normal.         Behavior: Behavior normal.         Thought Content: Thought content normal.         Judgment: Judgment normal.         Pertinent Laboratory/Diagnostic Studies:  Lab Results   Component Value Date    GLUCOSE 112 04/13/2015    BUN 19 02/29/2024    CREATININE 0.88 02/29/2024    CALCIUM 9.3 02/29/2024     04/13/2015    K 4.2 02/29/2024    CO2 29 02/29/2024     02/29/2024     Lab Results  "  Component Value Date    ALT 30 02/29/2024    AST 29 02/29/2024    ALKPHOS 49 02/29/2024    BILITOT 0.45 04/13/2015       Lab Results   Component Value Date    WBC 5.12 04/27/2023    HGB 16.2 04/27/2023    HCT 51.3 (H) 04/27/2023    MCV 89 04/27/2023     04/27/2023       No results found for: \"TSH\"    Lab Results   Component Value Date    CHOL 180 04/13/2015     Lab Results   Component Value Date    TRIG 98 02/29/2024     Lab Results   Component Value Date    HDL 32 (L) 02/29/2024     Lab Results   Component Value Date    LDLCALC 53 02/29/2024     Lab Results   Component Value Date    HGBA1C 6.0 (H) 02/29/2024       Results for orders placed or performed during the hospital encounter of 03/05/24   Tissue Exam   Result Value Ref Range    Case Report       Surgical Pathology Report                         Case: [id number]                                  Authorizing Provider:  Ioana Up MD       Collected:           03/05/2024 1019              Ordering Location:     [facility]        Received:            03/05/2024 [facility]                                                    Pathologist:           Gustavo Mistry MD                                                         Specimens:   A) - Esophagus, cold bx: distal esophagus                                                           B) - Polyp, Colorectal, cold polypectomy: transverse polyps                                Final Diagnosis       A. Esophagus, cold bx: distal esophagus:      - Squamocolumnar mucosa with no significant histopathologic abnormalities      - Negative for intestinal metaplasia, dysplasia or carcinoma    B. Polyp, Colorectal, cold polypectomy: transverse polyps:      - Fragments of tubular adenoma      - No high-grade dysplasia and no evidence of malignancy         Additional Information       Interpretation performed at [facility]. [address] " "[facility] [address]    All reported additional testing was performed with appropriately reactive controls.  These tests were developed and their performance characteristics determined by [facility] or appropriate performing facility, though some tests may be performed on tissues which have not been validated for performance characteristics (such as staining performed on alcohol exposed cell blocks and decalcified tissues).  Results should be interpreted with caution and in the context of the patients’ clinical condition. These tests may not be cleared or approved by the U.S. Food and Drug Administration, though the FDA has determined that such clearance or approval is not necessary. These tests are used for clinical purposes and they should not be regarded as investigational or for research. This laboratory has been approved by [facility], designated as a high-complexity laboratory and is qualified to perform these tests.  .      Synoptic Checklist          COLON/RECTUM POLYP FORM - GI - B          :    Adenoma(s)      Gross Description       A. The specimen is received in formalin, labeled with the patient's name and hospital number, and is designated \" distal esophagus\".  The specimen consists of 4 colorless soft tissue fragments measuring range from 0.3-0.7 cm in greatest dimension.  Entirely submitted. One screened cassette.  B. The specimen is received in formalin, labeled with the patient's name and hospital number, and is designated \" transverse polyp\".  The specimen consists of 7 tan soft tissue fragments measuring range from 0.2-0.5 cm in greatest dimension.  Due to the size and consistency of the specimen it may not survive histological processing.  Also present in container are presumed food particles/debris.  Entirely submitted. One screened cassette.    Note: The estimated total formalin fixation time based upon information provided by the submitting clinician and the standard " processing schedule is under 72 hours.      EZelaya         Orders Placed This Encounter   Procedures    Lyme Total AB W Reflex to IGM/IGG    Lyme Total AB W Reflex to IGM/IGG       ALLERGIES:  Allergies   Allergen Reactions    Esomeprazole        Current Medications     Current Outpatient Medications   Medication Sig Dispense Refill    ALPRAZolam (XANAX) 0.25 mg tablet TAKE 1+1/2 TABLETS BY MOUTH AT BEDTIME 45 tablet 3    aspirin (ECOTRIN LOW STRENGTH) 81 mg EC tablet Take 81 mg by mouth daily      Co Q-10 50 MG Take 50 mg by mouth      levothyroxine 112 mcg tablet Take 1 tablet (112 mcg total) by mouth daily in the early morning 90 tablet 1    Multiple Vitamins-Minerals (CENTRUM SILVER 50+MEN PO) Take 1 tablet by mouth daily      omeprazole (PriLOSEC) 40 MG capsule TAKE 1 CAPSULE BY MOUTH DAILY 90 capsule 1    rosuvastatin (CRESTOR) 20 MG tablet TAKE 1 TABLET BY MOUTH DAILY 90 tablet 1     No current facility-administered medications for this visit.         Health Maintenance     Health Maintenance   Topic Date Due    Zoster Vaccine (1 of 2) Never done    COVID-19 Vaccine (4 - 2023-24 season) 09/01/2023    RSV Vaccine Age 60+ Years (1 - 1-dose 60+ series) Never done    Depression Screening  04/29/2025    Annual Physical  04/29/2025    Colorectal Cancer Screening  03/04/2029    DTaP,Tdap,and Td Vaccines (4 - Td or Tdap) 04/29/2034    HIV Screening  Completed    Hepatitis C Screening  Completed    Influenza Vaccine  Completed    RSV Vaccine age 0-20 Months  Aged Out    Pneumococcal Vaccine: Pediatrics (0 to 5 Years) and At-Risk Patients (6 to 64 Years)  Aged Out    HIB Vaccine  Aged Out    IPV Vaccine  Aged Out    Hepatitis A Vaccine  Aged Out    Meningococcal ACWY Vaccine  Aged Out    HPV Vaccine  Aged Out     Immunization History   Administered Date(s) Administered    COVID-19 PFIZER VACCINE 0.3 ML IM 03/15/2021, 04/05/2021, 11/13/2021    INFLUENZA 10/31/2014, 01/23/2017, 12/04/2017, 12/01/2022    Influenza  Quadrivalent Preservative Free 3 years and older IM 10/30/2015, 01/23/2017, 12/04/2017    Influenza, injectable, quadrivalent, preservative free 0.5 mL 10/06/2018, 10/24/2023    Influenza, recombinant, quadrivalent,injectable, preservative free 10/19/2019, 10/21/2020, 10/16/2021    Influenza, seasonal, injectable 12/12/2013    Tdap 09/27/2010, 03/12/2014, 04/29/2024          MISSY Mehta

## 2024-06-11 ENCOUNTER — APPOINTMENT (OUTPATIENT)
Dept: LAB | Age: 59
End: 2024-06-11
Payer: COMMERCIAL

## 2024-06-11 DIAGNOSIS — S20.361A TICK BITE OF RIGHT FRONT WALL OF THORAX, INITIAL ENCOUNTER: ICD-10-CM
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
## 2024-06-11 LAB — B BURGDOR IGG+IGM SER QL IA: NEGATIVE

## 2024-06-11 PROCEDURE — 86618 LYME DISEASE ANTIBODY: CPT

## 2024-06-11 PROCEDURE — 36415 COLL VENOUS BLD VENIPUNCTURE: CPT
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
## 2024-06-15 PROBLEM — S20.361A TICK BITE OF RIGHT FRONT WALL OF THORAX: Status: ACTIVE | Noted: 2024-06-15

## 2024-06-18 ENCOUNTER — PROCEDURE VISIT (OUTPATIENT)
Age: 59
End: 2024-06-18
Payer: COMMERCIAL

## 2024-06-18 VITALS
HEART RATE: 68 BPM | DIASTOLIC BLOOD PRESSURE: 72 MMHG | BODY MASS INDEX: 31.25 KG/M2 | SYSTOLIC BLOOD PRESSURE: 126 MMHG | RESPIRATION RATE: 16 BRPM | HEIGHT: 70 IN

## 2024-06-18 DIAGNOSIS — M99.03 SEGMENTAL DYSFUNCTION OF LUMBAR REGION: ICD-10-CM

## 2024-06-18 DIAGNOSIS — M54.59 MECHANICAL LOW BACK PAIN: ICD-10-CM

## 2024-06-18 DIAGNOSIS — M99.01 SEGMENTAL DYSFUNCTION OF CERVICAL REGION: ICD-10-CM

## 2024-06-18 DIAGNOSIS — M99.02 SEGMENTAL DYSFUNCTION OF THORACIC REGION: Primary | ICD-10-CM

## 2024-06-18 DIAGNOSIS — M99.04 SEGMENTAL DYSFUNCTION OF SACRAL REGION: ICD-10-CM

## 2024-06-18 DIAGNOSIS — M54.2 NECK PAIN: ICD-10-CM

## 2024-06-18 PROCEDURE — 98941 CHIROPRACT MANJ 3-4 REGIONS: CPT | Performed by: CHIROPRACTOR

## 2024-06-18 PROCEDURE — 97110 THERAPEUTIC EXERCISES: CPT | Performed by: CHIROPRACTOR

## 2024-06-18 NOTE — LETTER
June 18, 2024     Patient: Carlos Irving  YOB: 1965  Date of Visit: 6/18/2024      To Whom it May Concern:    Carlos Irving is under my professional care. Carlos was seen in my office on 6/18/2024. Carlos came to our office for treatment today 6/18/24.    If you have any questions or concerns, please don't hesitate to call.         Sincerely,          Juanita Nugent DC        CC: No Recipients

## 2024-06-18 NOTE — PROGRESS NOTES
Diagnoses and all orders for this visit:    Segmental dysfunction of thoracic region    Neck pain    Segmental dysfunction of cervical region    Segmental dysfunction of lumbar region    Segmental dysfunction of sacral region    Mechanical low back pain      ASSESSMENT:  Pt's symptoms and exam findings consistent with mechanical neck pain secondary to repetitive st/sp injury of discogenic origin. Pt responded well to extension biased stretches and manual mobilization of the affected spinal and myofascial tissues with increased ROM; trial of conservative tx recommended consisting on Melony extension biased exercises, graded mobilization/manipulation of the affected tissues, postural/ergonomic education and take home stretches/exercises. If symptoms fail to centralize or neurologic deficit presents, appropriate imaging and referral will be coordinated.  -Pt has exacerbation of back pain without red flags. Tolerated treatment well with some improvements in pain and mm spasm  - Continued flare up, advised to take NSAIDs is not contraindicated for the next few days to decrease inflammation. Avoid significant bending or twisting. Explained relative rest.,    PROCEDURE CODES: 09492 and 87116    TREATMENT:  Fear avoidance behavior discussion, encouraged and reassured pt that natural course of condition is to improve over time with adherence to tx plan and home care strategies. Home care recommendations: walk to tolerance, gradual return to activity to tolerance (avoid anything that peripheralizes symptoms), call if symptoms peripheralize, worsen, or neurologic deficit progresses. Postural education, avoid heavy lifting, and prolonged cervical flexion. Use cervical roll as recommended, Use of lumbar roll as recommended, Ther-ex: IASTM to affected mm hypertonicities (discussed soreness/ecchymosis up to 36 hrs post procedure); cervical axial retraction, Brueggers position, seated scapular retraction, greater than 15 spent on  above mentioned ther-ex. Cervical mobilization/ long axis traction- manual with chin retraction, Thoracic mobilization/manipulation: prone P-A mob, Prone A-P CT manip; Lumbar mobilization- Flexion distraction, Sacrum. L SIJ- Long axis distraction    HPI  Carlos Irving is a 59 y.o. male  Chief Complaint   Patient presents with   • Shoulder Pain     Left    Pain Level: 4/10   • Neck Pain     Pain Level: 4/10   • Leg Pain     Left Sided Sciatica    Pain Level: 9/10     The patient presents to the office with neck and arm pain that has been an issue for several years. No specific trauma. That patient reports she has L shoulder surgery two years ago. The patient reports two weeks ago he has some pain in the L sided again without specific incident. The patient is an  and now works with [employer]. Less physical than before. Usually lifts weights but work on elastic bands and its helping.   1/22- The patient tolerated traetment well but some HA with last appt but subsided after a day and continued to work out.   2/5- The patient is feeling a little better since his last visit. Tight in the neck and lower back  4/30- The patient presents to the office with increase in pain into the base of the neck that feels sharp and a little into the L shoulder. The patient was exercises in his normal way and there was no trauma. No pain in to the UE.  5/14- The pt mentions his neck was improving but then a few days ago was trying to stretch and then pulled a muscle and then had a hard time turning.  5/30- Flare up of L sided pain and lateral hip pain after throwing around rocks at his camp site.   6/18- The patient is feeling some continued pain in L lower back and lateral hip since a few weeks ago, the pain is going up and down. Not taking any NSAIDs at this time for the pain.    Neck Pain   This is a chronic problem. The current episode started more than 1 year ago. The problem occurs 2 to 4 times per day. The problem  has been waxing and waning. The pain is associated with lifting a heavy object. Associated symptoms include leg pain.   Shoulder Pain   The pain is at a severity of 10/10.   Back Pain  Associated symptoms include leg pain.   Leg Pain       Past Medical History:   Diagnosis Date   • Colon polyp    • Disease of thyroid gland    • GERD (gastroesophageal reflux disease)    • Hyperlipidemia    • Pulmonary embolism (HCC)     last assessed 1/10/2013   • Snoring 04/25/2016      Past Surgical History:   Procedure Laterality Date   • [place] SURGICAL ARTHROSCOPY SHOULDER W/ROTATOR CUFF RPR Left 7/29/2022    Procedure: SHOULDER ARTHROSCOPIC ROTATOR CUFF REPAIR BICEPS TENODISIS;  Surgeon: Chino Fragoso MD;  Location: [facility] MAIN OR;  Service: Orthopedics   • TONSILLECTOMY AND ADENOIDECTOMY       The following portions of the patient's history were reviewed and updated as appropriate: allergies, past family history, past medical history, past social history, past surgical history, and problem list.  Review of Systems   Musculoskeletal:  Positive for back pain and neck pain.     Physical Exam  Musculoskeletal:         General: Tenderness present.      Cervical back: Rigidity, spasms, tenderness and crepitus present. No swelling, edema, deformity, erythema, signs of trauma, lacerations, torticollis or bony tenderness. Pain with movement present. Decreased range of motion.      Thoracic back: Spasms and tenderness present. No swelling, edema, deformity, signs of trauma, lacerations or bony tenderness. Decreased range of motion. No scoliosis.      Lumbar back: Spasms and tenderness present. No swelling, edema, deformity, signs of trauma, lacerations or bony tenderness. Decreased range of motion. No scoliosis.        Back:    Neurological:      Gait: Gait is intact.      Deep Tendon Reflexes: Reflexes are normal and symmetric.       SOFT TISSUE ASSESSMENT: Hypertonicity and tenderness palpated B C4-T2 T10-S1, L SIJ Upper trap,  levatory scap, L SCM, rhomboids, erector spinae, hip flexor, glute med/min JOINT RESTRICTIONS:C4-T2 T10-Ss. L SIJ ORTHO:Melony repeated flexion peripheralizes, extension centralizes;maximal foraminal compression-neg,cervical distraction- relieving symptoms; Spurlings negative for reproduction of radicular symptoms, SLR- Neg, Sherif- neg, FABERE- neg, Sitting ROOT- neg, SLUMP- Neg for neural tension    Return in about 1 week (around 6/25/2024) for Recheck.

## 2024-07-09 DIAGNOSIS — E03.9 HYPOTHYROIDISM, UNSPECIFIED TYPE: ICD-10-CM

## 2024-07-09 RX ORDER — LEVOTHYROXINE SODIUM 112 UG/1
TABLET ORAL
Qty: 90 TABLET | Refills: 0 | Status: SHIPPED | OUTPATIENT
Start: 2024-07-09

## 2024-07-11 ENCOUNTER — PROCEDURE VISIT (OUTPATIENT)
Age: 59
End: 2024-07-11
Payer: COMMERCIAL

## 2024-07-11 ENCOUNTER — APPOINTMENT (OUTPATIENT)
Dept: LAB | Age: 59
End: 2024-07-11
Payer: COMMERCIAL

## 2024-07-11 VITALS
SYSTOLIC BLOOD PRESSURE: 122 MMHG | DIASTOLIC BLOOD PRESSURE: 73 MMHG | HEIGHT: 70 IN | HEART RATE: 56 BPM | BODY MASS INDEX: 31.07 KG/M2 | WEIGHT: 217 LBS

## 2024-07-11 DIAGNOSIS — M54.2 NECK PAIN: ICD-10-CM

## 2024-07-11 DIAGNOSIS — S20.361A TICK BITE OF RIGHT FRONT WALL OF THORAX, INITIAL ENCOUNTER: ICD-10-CM

## 2024-07-11 DIAGNOSIS — M99.03 SEGMENTAL DYSFUNCTION OF LUMBAR REGION: ICD-10-CM

## 2024-07-11 DIAGNOSIS — Z12.5 SCREENING FOR PROSTATE CANCER: ICD-10-CM

## 2024-07-11 DIAGNOSIS — M99.04 SEGMENTAL DYSFUNCTION OF SACRAL REGION: ICD-10-CM
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
## 2024-07-11 DIAGNOSIS — R73.01 IFG (IMPAIRED FASTING GLUCOSE): ICD-10-CM

## 2024-07-11 DIAGNOSIS — M99.01 SEGMENTAL DYSFUNCTION OF CERVICAL REGION: ICD-10-CM

## 2024-07-11 DIAGNOSIS — M99.02 SEGMENTAL DYSFUNCTION OF THORACIC REGION: Primary | ICD-10-CM

## 2024-07-11 DIAGNOSIS — M54.59 MECHANICAL LOW BACK PAIN: ICD-10-CM

## 2024-07-11 LAB
B BURGDOR IGG+IGM SER QL IA: NEGATIVE
EST. AVERAGE GLUCOSE BLD GHB EST-MCNC: 123 MG/DL
HBA1C MFR BLD: 5.9 %
PSA SERPL-MCNC: 0.51 NG/ML (ref 0–4)

## 2024-07-11 PROCEDURE — 36415 COLL VENOUS BLD VENIPUNCTURE: CPT

## 2024-07-11 PROCEDURE — 98941 CHIROPRACT MANJ 3-4 REGIONS: CPT | Performed by: CHIROPRACTOR

## 2024-07-11 PROCEDURE — 86618 LYME DISEASE ANTIBODY: CPT

## 2024-07-11 PROCEDURE — 83036 HEMOGLOBIN GLYCOSYLATED A1C: CPT

## 2024-07-11 PROCEDURE — G0103 PSA SCREENING: HCPCS

## 2024-07-11 PROCEDURE — 97110 THERAPEUTIC EXERCISES: CPT | Performed by: CHIROPRACTOR

## 2024-07-11 NOTE — PROGRESS NOTES
Diagnoses and all orders for this visit:    Segmental dysfunction of thoracic region    Neck pain    Segmental dysfunction of cervical region    Segmental dysfunction of lumbar region    Segmental dysfunction of sacral region    Mechanical low back pain      ASSESSMENT:  Pt's symptoms and exam findings consistent with mechanical neck pain secondary to repetitive st/sp injury of discogenic origin. Pt responded well to extension biased stretches and manual mobilization of the affected spinal and myofascial tissues with increased ROM; trial of conservative tx recommended consisting on Melony extension biased exercises, graded mobilization/manipulation of the affected tissues, postural/ergonomic education and take home stretches/exercises. If symptoms fail to centralize or neurologic deficit presents, appropriate imaging and referral will be coordinated.  -Pt has exacerbation of back pain without red flags. Tolerated treatment well with some improvements in pain and mm spasm  - The patient tolerated treatment fairly well but F/U in one week.    CODES: 49264 and 95808    TREATMENT:  Fear avoidance behavior discussion, encouraged and reassured pt that natural course of condition is to improve over time with adherence to tx plan and home care strategies. Home care recommendations: walk to tolerance, gradual return to activity to tolerance (avoid anything that peripheralizes symptoms), call if symptoms peripheralize, worsen, or neurologic deficit progresses. Postural education, avoid heavy lifting, and prolonged cervical flexion. Use cervical roll as recommended, Use of lumbar roll as recommended, Ther-ex: IASTM to affected mm hypertonicities (discussed soreness/ecchymosis up to 36 hrs post procedure); cervical axial retraction, Brueggers position, seated scapular retraction, greater than 15 spent on above mentioned ther-ex. Cervical mobilization/ long axis traction- manual with chin retraction, Thoracic  mobilization/manipulation: prone P-A mob, Prone A-P CT manip; Lumbar mobilization- Flexion distraction, Sacrum. L SIJ- Long axis distraction    HPI  Carlos Irving is a 59 y.o. male  Chief Complaint   Patient presents with   • Neck - Pain     Neck pain is tight . Patient states more on the right side. Pain score 5      • Back Pain     Lower lumbar is sore.   Pain score 3-4     The patient presents to the office with neck and arm pain that has been an issue for several years. No specific trauma. That patient reports she has L shoulder surgery two years ago. The patient reports two weeks ago he has some pain in the L sided again without specific incident. The patient is an  and now works with [employer]. Less physical than before. Usually lifts weights but work on elastic bands and its helping.   1/22- The patient tolerated traetment well but some HA with last appt but subsided after a day and continued to work out.   2/5- The patient is feeling a little better since his last visit. Tight in the neck and lower back  4/30- The patient presents to the office with increase in pain into the base of the neck that feels sharp and a little into the L shoulder. The patient was exercises in his normal way and there was no trauma. No pain in to the UE.  5/14- The pt mentions his neck was improving but then a few days ago was trying to stretch and then pulled a muscle and then had a hard time turning.  5/30- Flare up of L sided pain and lateral hip pain after throwing around rocks at his camp site.   6/18- The patient is feeling some continued pain in L lower back and lateral hip since a few weeks ago, the pain is going up and down. Not taking any NSAIDs at this time for the pain.  7/11- The pt reports his neck and upper back tight but the pain has improved with continuing to work out.    Neck Pain   This is a chronic problem. The current episode started more than 1 year ago. The problem occurs 2 to 4 times per  day. The problem has been waxing and waning. The pain is associated with lifting a heavy object. Associated symptoms include leg pain.   Shoulder Pain   The pain is at a severity of 10/10.   Back Pain  Associated symptoms include leg pain.   Leg Pain       Past Medical History:   Diagnosis Date   • Colon polyp    • Disease of thyroid gland    • GERD (gastroesophageal reflux disease)    • Hyperlipidemia    • Pulmonary embolism (HCC)     last assessed 1/10/2013   • Snoring 04/25/2016      Past Surgical History:   Procedure Laterality Date   • [place] SURGICAL ARTHROSCOPY SHOULDER W/ROTATOR CUFF RPR Left 7/29/2022    Procedure: SHOULDER ARTHROSCOPIC ROTATOR CUFF REPAIR BICEPS TENODISIS;  Surgeon: Chino Fragoso MD;  Location: [facility] MAIN OR;  Service: Orthopedics   • TONSILLECTOMY AND ADENOIDECTOMY       The following portions of the patient's history were reviewed and updated as appropriate: allergies, past family history, past medical history, past social history, past surgical history, and problem list.  Review of Systems   Musculoskeletal:  Positive for back pain and neck pain.     Physical Exam  Musculoskeletal:         General: Tenderness present.      Cervical back: Rigidity, spasms, tenderness and crepitus present. No swelling, edema, deformity, erythema, signs of trauma, lacerations, torticollis or bony tenderness. Pain with movement present. Decreased range of motion.      Thoracic back: Spasms and tenderness present. No swelling, edema, deformity, signs of trauma, lacerations or bony tenderness. Decreased range of motion. No scoliosis.      Lumbar back: Spasms and tenderness present. No swelling, edema, deformity, signs of trauma, lacerations or bony tenderness. Decreased range of motion. No scoliosis.        Back:    Neurological:      Gait: Gait is intact.      Deep Tendon Reflexes: Reflexes are normal and symmetric.       SOFT TISSUE ASSESSMENT: Hypertonicity and tenderness palpated B C4-T2 T10-S1, L  SIJ Upper trap, levatory scap, L SCM, rhomboids, erector spinae, hip flexor, glute med/min JOINT RESTRICTIONS:C4-T2 T10-Ss. L SIJ ORTHO:Melony repeated flexion peripheralizes, extension centralizes;maximal foraminal compression-neg,cervical distraction- relieving symptoms; Spurlings negative for reproduction of radicular symptoms, SLR- Neg, Sherif- neg, FABERE- neg, Sitting ROOT- neg, SLUMP- Neg for neural tension    Return in about 1 week (around 7/18/2024) for Recheck.

## 2024-07-11 NOTE — LETTER
July 11, 2024     Patient: Carlos Irving  YOB: 1965  Date of Visit: 7/11/2024      To Whom it May Concern:    Carlos Irving is under my professional care. Carlos was seen in my office on 7/11/2024. Carlos . The patient was at our office today 7/11/2024 for treatment.     If you have any questions or concerns, please don't hesitate to call.         Sincerely,          Juanita Nugent DC        CC: No Recipients

## 2024-07-24 ENCOUNTER — APPOINTMENT (INPATIENT)
Dept: RADIOLOGY | Facility: HOSPITAL | Age: 59
DRG: 213 | End: 2024-07-24
Payer: OTHER MISCELLANEOUS

## 2024-07-24 ENCOUNTER — ANESTHESIA (INPATIENT)
Dept: PERIOP | Facility: HOSPITAL | Age: 59
DRG: 213 | End: 2024-07-24
Payer: OTHER MISCELLANEOUS

## 2024-07-24 ENCOUNTER — ANESTHESIA EVENT (INPATIENT)
Dept: PERIOP | Facility: HOSPITAL | Age: 59
DRG: 213 | End: 2024-07-24
Payer: OTHER MISCELLANEOUS

## 2024-07-24 ENCOUNTER — HOSPITAL ENCOUNTER (INPATIENT)
Facility: HOSPITAL | Age: 59
LOS: 1 days | Discharge: HOME | DRG: 213 | End: 2024-07-24
Attending: EMERGENCY MEDICINE | Admitting: SURGERY
Payer: OTHER MISCELLANEOUS

## 2024-07-24 VITALS
BODY MASS INDEX: 29.63 KG/M2 | DIASTOLIC BLOOD PRESSURE: 81 MMHG | SYSTOLIC BLOOD PRESSURE: 133 MMHG | RESPIRATION RATE: 17 BRPM | HEART RATE: 56 BPM | HEIGHT: 70 IN | WEIGHT: 207 LBS | TEMPERATURE: 97.6 F | OXYGEN SATURATION: 95 %

## 2024-07-24 DIAGNOSIS — Z98.890 S/P ORIF (OPEN REDUCTION INTERNAL FIXATION) FRACTURE: Primary | ICD-10-CM

## 2024-07-24 DIAGNOSIS — Z87.81 S/P ORIF (OPEN REDUCTION INTERNAL FIXATION) FRACTURE: Primary | ICD-10-CM

## 2024-07-24 DIAGNOSIS — S63.106A: ICD-10-CM

## 2024-07-24 DIAGNOSIS — S62.501B: ICD-10-CM

## 2024-07-24 LAB
ALBUMIN SERPL BCG-MCNC: 4.1 G/DL (ref 3.5–5)
ALP SERPL-CCNC: 53 U/L (ref 34–104)
ALT SERPL W P-5'-P-CCNC: 23 U/L (ref 7–52)
ANION GAP SERPL CALCULATED.3IONS-SCNC: 5 MMOL/L (ref 4–13)
AST SERPL W P-5'-P-CCNC: 20 U/L (ref 13–39)
BASOPHILS # BLD AUTO: 0.03 THOUSANDS/ÂΜL (ref 0–0.1)
BASOPHILS NFR BLD AUTO: 1 % (ref 0–1)
BILIRUB SERPL-MCNC: 0.48 MG/DL (ref 0.2–1)
BUN SERPL-MCNC: 24 MG/DL (ref 5–25)
CALCIUM SERPL-MCNC: 9 MG/DL (ref 8.4–10.2)
CHLORIDE SERPL-SCNC: 107 MMOL/L (ref 96–108)
CO2 SERPL-SCNC: 26 MMOL/L (ref 21–32)
CREAT SERPL-MCNC: 0.83 MG/DL (ref 0.6–1.3)
EOSINOPHIL # BLD AUTO: 0.08 THOUSAND/ÂΜL (ref 0–0.61)
EOSINOPHIL NFR BLD AUTO: 2 % (ref 0–6)
ERYTHROCYTE [DISTWIDTH] IN BLOOD BY AUTOMATED COUNT: 13.2 % (ref 11.6–15.1)
GFR SERPL CREATININE-BSD FRML MDRD: 95 ML/MIN/1.73SQ M
GLUCOSE SERPL-MCNC: 109 MG/DL (ref 65–140)
HCT VFR BLD AUTO: 41.7 % (ref 36.5–49.3)
HGB BLD-MCNC: 14.5 G/DL (ref 12–17)
IMM GRANULOCYTES # BLD AUTO: 0.02 THOUSAND/UL (ref 0–0.2)
IMM GRANULOCYTES NFR BLD AUTO: 0 % (ref 0–2)
LYMPHOCYTES # BLD AUTO: 1.42 THOUSANDS/ÂΜL (ref 0.6–4.47)
LYMPHOCYTES NFR BLD AUTO: 28 % (ref 14–44)
MCH RBC QN AUTO: 31.3 PG (ref 26.8–34.3)
MCHC RBC AUTO-ENTMCNC: 34.8 G/DL (ref 31.4–37.4)
MCV RBC AUTO: 90 FL (ref 82–98)
MONOCYTES # BLD AUTO: 0.6 THOUSAND/ÂΜL (ref 0.17–1.22)
MONOCYTES NFR BLD AUTO: 12 % (ref 4–12)
NEUTROPHILS # BLD AUTO: 2.91 THOUSANDS/ÂΜL (ref 1.85–7.62)
NEUTS SEG NFR BLD AUTO: 57 % (ref 43–75)
NRBC BLD AUTO-RTO: 0 /100 WBCS
PLATELET # BLD AUTO: 244 THOUSANDS/UL (ref 149–390)
PMV BLD AUTO: 8.2 FL (ref 8.9–12.7)
POTASSIUM SERPL-SCNC: 4 MMOL/L (ref 3.5–5.3)
PROT SERPL-MCNC: 6.9 G/DL (ref 6.4–8.4)
RBC # BLD AUTO: 4.63 MILLION/UL (ref 3.88–5.62)
SODIUM SERPL-SCNC: 138 MMOL/L (ref 135–147)
WBC # BLD AUTO: 5.06 THOUSAND/UL (ref 4.31–10.16)

## 2024-07-24 PROCEDURE — 85025 COMPLETE CBC W/AUTO DIFF WBC: CPT | Performed by: EMERGENCY MEDICINE

## 2024-07-24 PROCEDURE — 11760 REPAIR OF NAIL BED: CPT

## 2024-07-24 PROCEDURE — C1713 ANCHOR/SCREW BN/BN,TIS/BN: HCPCS | Performed by: SURGERY

## 2024-07-24 PROCEDURE — 73140 X-RAY EXAM OF FINGER(S): CPT

## 2024-07-24 PROCEDURE — 99284 EMERGENCY DEPT VISIT MOD MDM: CPT

## 2024-07-24 PROCEDURE — 96365 THER/PROPH/DIAG IV INF INIT: CPT

## 2024-07-24 PROCEDURE — 26765 TREAT FINGER FRACTURE EACH: CPT | Performed by: SURGERY

## 2024-07-24 PROCEDURE — 99285 EMERGENCY DEPT VISIT HI MDM: CPT | Performed by: EMERGENCY MEDICINE

## 2024-07-24 PROCEDURE — 96375 TX/PRO/DX INJ NEW DRUG ADDON: CPT

## 2024-07-24 PROCEDURE — 0XQL0ZZ REPAIR RIGHT THUMB, OPEN APPROACH: ICD-10-PCS | Performed by: SURGERY

## 2024-07-24 PROCEDURE — 26765 TREAT FINGER FRACTURE EACH: CPT

## 2024-07-24 PROCEDURE — 0HQQXZZ REPAIR FINGER NAIL, EXTERNAL APPROACH: ICD-10-PCS | Performed by: SURGERY

## 2024-07-24 PROCEDURE — 26418 REPAIR FINGER TENDON: CPT | Performed by: SURGERY

## 2024-07-24 PROCEDURE — 26418 REPAIR FINGER TENDON: CPT

## 2024-07-24 PROCEDURE — 80053 COMPREHEN METABOLIC PANEL: CPT | Performed by: EMERGENCY MEDICINE

## 2024-07-24 PROCEDURE — 0H9FXZZ DRAINAGE OF RIGHT HAND SKIN, EXTERNAL APPROACH: ICD-10-PCS | Performed by: SURGERY

## 2024-07-24 PROCEDURE — 64450 NJX AA&/STRD OTHER PN/BRANCH: CPT | Performed by: EMERGENCY MEDICINE

## 2024-07-24 PROCEDURE — 36415 COLL VENOUS BLD VENIPUNCTURE: CPT | Performed by: EMERGENCY MEDICINE

## 2024-07-24 PROCEDURE — 90715 TDAP VACCINE 7 YRS/> IM: CPT | Performed by: EMERGENCY MEDICINE

## 2024-07-24 PROCEDURE — 11760 REPAIR OF NAIL BED: CPT | Performed by: SURGERY

## 2024-07-24 PROCEDURE — 99223 1ST HOSP IP/OBS HIGH 75: CPT | Performed by: SURGERY

## 2024-07-24 PROCEDURE — NC001 PR NO CHARGE: Performed by: SURGERY

## 2024-07-24 DEVICE — C-WIRE PAK DOUBLE ENDED ORTHOPAEDIC WIRE, SPADE, .045" (1.14 MM)
Type: IMPLANTABLE DEVICE | Site: FINGER | Status: FUNCTIONAL
Brand: C-WIRE

## 2024-07-24 RX ORDER — OXYCODONE HYDROCHLORIDE 5 MG/1
5 TABLET ORAL EVERY 4 HOURS PRN
Status: DISCONTINUED | OUTPATIENT
Start: 2024-07-24 | End: 2024-07-24 | Stop reason: HOSPADM

## 2024-07-24 RX ORDER — EPHEDRINE SULFATE 50 MG/ML
INJECTION INTRAVENOUS AS NEEDED
Status: DISCONTINUED | OUTPATIENT
Start: 2024-07-24 | End: 2024-07-24

## 2024-07-24 RX ORDER — CEFAZOLIN SODIUM 2 G/50ML
2000 SOLUTION INTRAVENOUS EVERY 8 HOURS
Status: DISCONTINUED | OUTPATIENT
Start: 2024-07-25 | End: 2024-07-24 | Stop reason: HOSPADM

## 2024-07-24 RX ORDER — ROSUVASTATIN CALCIUM 20 MG/1
20 TABLET, COATED ORAL
Status: DISCONTINUED | OUTPATIENT
Start: 2024-07-24 | End: 2024-07-24 | Stop reason: HOSPADM

## 2024-07-24 RX ORDER — BUPIVACAINE HYDROCHLORIDE 2.5 MG/ML
INJECTION, SOLUTION EPIDURAL; INFILTRATION; INTRACAUDAL AS NEEDED
Status: DISCONTINUED | OUTPATIENT
Start: 2024-07-24 | End: 2024-07-24 | Stop reason: HOSPADM

## 2024-07-24 RX ORDER — ALPRAZOLAM 0.25 MG/1
0.25 TABLET ORAL
Status: DISCONTINUED | OUTPATIENT
Start: 2024-07-24 | End: 2024-07-24 | Stop reason: HOSPADM

## 2024-07-24 RX ORDER — OMEPRAZOLE 20 MG/1
40 CAPSULE, DELAYED RELEASE ORAL
Status: DISCONTINUED | OUTPATIENT
Start: 2024-07-24 | End: 2024-07-24 | Stop reason: HOSPADM

## 2024-07-24 RX ORDER — ONDANSETRON 2 MG/ML
INJECTION INTRAMUSCULAR; INTRAVENOUS AS NEEDED
Status: DISCONTINUED | OUTPATIENT
Start: 2024-07-24 | End: 2024-07-24

## 2024-07-24 RX ORDER — LEVOTHYROXINE SODIUM 112 UG/1
112 TABLET ORAL
Status: DISCONTINUED | OUTPATIENT
Start: 2024-07-25 | End: 2024-07-24 | Stop reason: HOSPADM

## 2024-07-24 RX ORDER — BUPIVACAINE HYDROCHLORIDE 2.5 MG/ML
10 INJECTION, SOLUTION EPIDURAL; INFILTRATION; INTRACAUDAL ONCE
Status: COMPLETED | OUTPATIENT
Start: 2024-07-24 | End: 2024-07-24

## 2024-07-24 RX ORDER — SODIUM CHLORIDE, SODIUM LACTATE, POTASSIUM CHLORIDE, CALCIUM CHLORIDE 600; 310; 30; 20 MG/100ML; MG/100ML; MG/100ML; MG/100ML
50 INJECTION, SOLUTION INTRAVENOUS CONTINUOUS
Status: DISCONTINUED | OUTPATIENT
Start: 2024-07-24 | End: 2024-07-24 | Stop reason: HOSPADM

## 2024-07-24 RX ORDER — CEFAZOLIN SODIUM 2 G/50ML
2000 SOLUTION INTRAVENOUS
Status: COMPLETED | OUTPATIENT
Start: 2024-07-25 | End: 2024-07-24

## 2024-07-24 RX ORDER — LIDOCAINE HYDROCHLORIDE 10 MG/ML
INJECTION, SOLUTION EPIDURAL; INFILTRATION; INTRACAUDAL; PERINEURAL AS NEEDED
Status: DISCONTINUED | OUTPATIENT
Start: 2024-07-24 | End: 2024-07-24

## 2024-07-24 RX ORDER — SODIUM CHLORIDE, SODIUM LACTATE, POTASSIUM CHLORIDE, CALCIUM CHLORIDE 600; 310; 30; 20 MG/100ML; MG/100ML; MG/100ML; MG/100ML
INJECTION, SOLUTION INTRAVENOUS CONTINUOUS PRN
Status: DISCONTINUED | OUTPATIENT
Start: 2024-07-24 | End: 2024-07-24

## 2024-07-24 RX ORDER — MAGNESIUM HYDROXIDE 1200 MG/15ML
LIQUID ORAL AS NEEDED
Status: DISCONTINUED | OUTPATIENT
Start: 2024-07-24 | End: 2024-07-24 | Stop reason: HOSPADM

## 2024-07-24 RX ORDER — FENTANYL CITRATE 50 UG/ML
INJECTION, SOLUTION INTRAMUSCULAR; INTRAVENOUS AS NEEDED
Status: DISCONTINUED | OUTPATIENT
Start: 2024-07-24 | End: 2024-07-24

## 2024-07-24 RX ORDER — ONDANSETRON 2 MG/ML
4 INJECTION INTRAMUSCULAR; INTRAVENOUS ONCE
Status: COMPLETED | OUTPATIENT
Start: 2024-07-24 | End: 2024-07-24

## 2024-07-24 RX ORDER — MORPHINE SULFATE 4 MG/ML
4 INJECTION, SOLUTION INTRAMUSCULAR; INTRAVENOUS ONCE
Status: COMPLETED | OUTPATIENT
Start: 2024-07-24 | End: 2024-07-24

## 2024-07-24 RX ORDER — FENTANYL CITRATE/PF 50 MCG/ML
50 SYRINGE (ML) INJECTION
Status: DISCONTINUED | OUTPATIENT
Start: 2024-07-24 | End: 2024-07-24 | Stop reason: HOSPADM

## 2024-07-24 RX ORDER — OXYCODONE HYDROCHLORIDE 10 MG/1
10 TABLET ORAL EVERY 4 HOURS PRN
Status: DISCONTINUED | OUTPATIENT
Start: 2024-07-24 | End: 2024-07-24 | Stop reason: HOSPADM

## 2024-07-24 RX ORDER — ONDANSETRON 2 MG/ML
4 INJECTION INTRAMUSCULAR; INTRAVENOUS ONCE AS NEEDED
Status: DISCONTINUED | OUTPATIENT
Start: 2024-07-24 | End: 2024-07-24 | Stop reason: HOSPADM

## 2024-07-24 RX ORDER — MIDAZOLAM HYDROCHLORIDE 2 MG/2ML
INJECTION, SOLUTION INTRAMUSCULAR; INTRAVENOUS AS NEEDED
Status: DISCONTINUED | OUTPATIENT
Start: 2024-07-24 | End: 2024-07-24

## 2024-07-24 RX ORDER — ACETAMINOPHEN 325 MG/1
650 TABLET ORAL EVERY 6 HOURS PRN
Status: DISCONTINUED | OUTPATIENT
Start: 2024-07-24 | End: 2024-07-24 | Stop reason: HOSPADM

## 2024-07-24 RX ORDER — OXYCODONE HYDROCHLORIDE 5 MG/1
5 TABLET ORAL EVERY 4 HOURS PRN
Qty: 10 TABLET | Refills: 0 | Status: SHIPPED | OUTPATIENT
Start: 2024-07-24

## 2024-07-24 RX ORDER — ONDANSETRON 2 MG/ML
4 INJECTION INTRAMUSCULAR; INTRAVENOUS EVERY 6 HOURS PRN
Status: DISCONTINUED | OUTPATIENT
Start: 2024-07-24 | End: 2024-07-24 | Stop reason: HOSPADM

## 2024-07-24 RX ORDER — ENOXAPARIN SODIUM 100 MG/ML
40 INJECTION SUBCUTANEOUS DAILY
Status: DISCONTINUED | OUTPATIENT
Start: 2024-07-25 | End: 2024-07-24 | Stop reason: HOSPADM

## 2024-07-24 RX ORDER — DEXAMETHASONE SODIUM PHOSPHATE 10 MG/ML
INJECTION, SOLUTION INTRAMUSCULAR; INTRAVENOUS AS NEEDED
Status: DISCONTINUED | OUTPATIENT
Start: 2024-07-24 | End: 2024-07-24

## 2024-07-24 RX ORDER — PROPOFOL 10 MG/ML
INJECTION, EMULSION INTRAVENOUS AS NEEDED
Status: DISCONTINUED | OUTPATIENT
Start: 2024-07-24 | End: 2024-07-24

## 2024-07-24 RX ORDER — CEFAZOLIN SODIUM 2 G/50ML
2000 SOLUTION INTRAVENOUS ONCE
Status: COMPLETED | OUTPATIENT
Start: 2024-07-24 | End: 2024-07-24

## 2024-07-24 RX ORDER — HYDROMORPHONE HCL IN WATER/PF 6 MG/30 ML
0.2 PATIENT CONTROLLED ANALGESIA SYRINGE INTRAVENOUS EVERY 4 HOURS PRN
Status: DISCONTINUED | OUTPATIENT
Start: 2024-07-24 | End: 2024-07-24 | Stop reason: HOSPADM

## 2024-07-24 RX ADMIN — BUPIVACAINE HYDROCHLORIDE 10 ML: 2.5 INJECTION, SOLUTION EPIDURAL; INFILTRATION; INTRACAUDAL; PERINEURAL at 08:32

## 2024-07-24 RX ADMIN — CEFAZOLIN SODIUM 2000 MG: 2 SOLUTION INTRAVENOUS at 08:37

## 2024-07-24 RX ADMIN — SODIUM CHLORIDE, SODIUM LACTATE, POTASSIUM CHLORIDE, AND CALCIUM CHLORIDE: .6; .31; .03; .02 INJECTION, SOLUTION INTRAVENOUS at 16:46

## 2024-07-24 RX ADMIN — SODIUM CHLORIDE 500 ML: 0.9 INJECTION, SOLUTION INTRAVENOUS at 08:31

## 2024-07-24 RX ADMIN — PROPOFOL 200 MG: 10 INJECTION, EMULSION INTRAVENOUS at 16:51

## 2024-07-24 RX ADMIN — ONDANSETRON 4 MG: 2 INJECTION INTRAMUSCULAR; INTRAVENOUS at 17:02

## 2024-07-24 RX ADMIN — CEFAZOLIN SODIUM 2000 MG: 2 SOLUTION INTRAVENOUS at 16:50

## 2024-07-24 RX ADMIN — EPHEDRINE SULFATE 10 MG: 50 INJECTION INTRAVENOUS at 17:17

## 2024-07-24 RX ADMIN — MIDAZOLAM 2 MG: 1 INJECTION INTRAMUSCULAR; INTRAVENOUS at 16:47

## 2024-07-24 RX ADMIN — OXYCODONE HYDROCHLORIDE 10 MG: 10 TABLET ORAL at 13:05

## 2024-07-24 RX ADMIN — ONDANSETRON 4 MG: 2 INJECTION INTRAMUSCULAR; INTRAVENOUS at 08:33

## 2024-07-24 RX ADMIN — FENTANYL CITRATE 50 MCG: 50 INJECTION INTRAMUSCULAR; INTRAVENOUS at 16:51

## 2024-07-24 RX ADMIN — LIDOCAINE HYDROCHLORIDE 50 MG: 10 INJECTION, SOLUTION EPIDURAL; INFILTRATION; INTRACAUDAL at 16:51

## 2024-07-24 RX ADMIN — MORPHINE SULFATE 4 MG: 4 INJECTION INTRAVENOUS at 08:34

## 2024-07-24 RX ADMIN — TETANUS TOXOID, REDUCED DIPHTHERIA TOXOID AND ACELLULAR PERTUSSIS VACCINE, ADSORBED 0.5 ML: 5; 2.5; 8; 8; 2.5 SUSPENSION INTRAMUSCULAR at 15:10

## 2024-07-24 RX ADMIN — DEXAMETHASONE SODIUM PHOSPHATE 10 MG: 10 INJECTION INTRAMUSCULAR; INTRAVENOUS at 16:51

## 2024-07-24 RX ADMIN — HYDROMORPHONE HYDROCHLORIDE 0.2 MG: 0.2 INJECTION, SOLUTION INTRAMUSCULAR; INTRAVENOUS; SUBCUTANEOUS at 15:08

## 2024-07-24 NOTE — DISCHARGE INSTR - AVS FIRST PAGE
Discharge Instructions - Orthopedics  Carlos Irving 59 y.o. male MRN: [id number]  Unit/Bed#: PACU 2    Weight Bearing Status:                                           Nonweightbearing  to the right upper extremity.    Pain:  Continue analgesics as directed    Dressing Instructions:   Do not remove splint to the right upper extremity. Do not get splint wet. Please keep splint clean, dry and intact until follow up     Appt Instructions:   Follow up with Dr. Reyes in 2-3 weeks   If you do not have your appointment, please call the clinic at [phone]  Otherwise follow up as scheduled.    Contact the office sooner if you experience any increased numbness/tingling in the extremities.      Miscellaneous:  Encourage motion of the fingers not splinted. Encourage elevation of the right upper extremity.

## 2024-07-24 NOTE — H&P
Orthopedics H&P  Carlos Irving 59 y.o. male MRN: [id number]  Unit/Bed#: ED-40      Chief Complaint:   Right thumb pain     HPI:  59 y.o. male community ambulator left hand dominant male who presents s/p accident with a lili hammer at work today. He notes that he was working with the lili hammer when the bit broke loose, resulting in injury to the right thumb. He denies injury to any other location, and only an isolated right thumb injury. He has been washed out, cleansed and splinted by ER prior to hand surgery arrival.   His PMH is inclusive of thyroid disorder, GERD, hyperlipidemia, PE (on aspirin daily).   He otherwise feels in his regular state of health.     Review Of Systems:   Skin: as per HPI  Neuro: See HPI  Musculoskeletal: See HPI  14 point review of systems negative except as stated above     Past Medical History:   Past Medical History:   Diagnosis Date    Colon polyp     Disease of thyroid gland     GERD (gastroesophageal reflux disease)     Hyperlipidemia     Pulmonary embolism (HCC)     last assessed 1/10/2013    Snoring 04/25/2016       Past Surgical History:   Past Surgical History:   Procedure Laterality Date    [place] SURGICAL ARTHROSCOPY SHOULDER W/ROTATOR CUFF RPR Left 7/29/2022    Procedure: SHOULDER ARTHROSCOPIC ROTATOR CUFF REPAIR BICEPS TENODISIS;  Surgeon: Chino Fragoso MD;  Location: [facility] MAIN OR;  Service: Orthopedics    TONSILLECTOMY AND ADENOIDECTOMY         Family History:  Family history reviewed and non-contributory  Family History   Problem Relation Age of Onset    Diabetes Mother         DM    Hypothyroidism Mother     Glaucoma Mother     Hypertension Mother     Alcohol abuse Sister     Drug abuse Sister     Hypothyroidism Sister     Lung cancer Maternal Grandmother        Social History:  Social History     Socioeconomic History    Marital status: /Civil Union     Spouse name: Not on file    Number of children: 3    Years of education: Not on file    Highest  education level: Not on file   Occupational History    Occupation:      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   Tobacco Use    Smoking status: Former     Current packs/day: 0.00     Types: Cigarettes     Quit date:      Years since quittin.5     Passive exposure: Past    Smokeless tobacco: Never   Vaping Use    Vaping status: Former    Substances: CBD, Flavoring   Substance and Sexual Activity    Alcohol use: Yes     Comment: occ    Drug use: No    Sexual activity: Yes     Partners: Female   Other Topics Concern    Not on file   Social History Narrative    Recreational activities martial arts      Social Determinants of Health     Financial Resource Strain: Not on file   Food Insecurity: Not on file   Transportation Needs: Not on file   Physical Activity: Not on file   Stress: Not on file   Social Connections: Not on file   Intimate Partner Violence: Not on file   Housing Stability: Not on file       Allergies:   Allergies   Allergen Reactions    Esomeprazole            Labs:  0   Lab Value Date/Time    HCT 41.7 2024 0830    HCT 51.3 (H) 2023 0637    HCT 49.9 (H) 2019 0638    HGB 14.5 2024 0830    HGB 16.2 2023 0637    HGB 16.2 2019 0638    WBC 5.06 2024 0830    WBC 5.12 2023 0637    WBC 4.83 2019 0638       Meds:    Current Facility-Administered Medications:     acetaminophen (TYLENOL) tablet 650 mg, 650 mg, Oral, Q6H PRN, Sangeeta Greer PA-C    ALPRAZolam (XANAX) tablet 0.25 mg, 0.25 mg, Oral, HS PRN, Sangeeta Greer PA-C    [START ON 2024] aspirin (ECOTRIN LOW STRENGTH) EC tablet 81 mg, 81 mg, Oral, HS, Sangeeta Greer PA-C    HYDROmorphone HCl (DILAUDID) injection 0.2 mg, 0.2 mg, Intravenous, Q4H PRN, Sangeeta Greer PA-C    [START ON 2024] levothyroxine tablet 112 mcg, 112 mcg, Oral, Early Morning, Sangeeta Greer PA-C    omeprazole (PriLOSEC) delayed  "release capsule 40 mg, 40 mg, Oral, HS, Sangeeta Woodsonhleen CHIDI Greer-JEFFREY    ondansetron (ZOFRAN) injection 4 mg, 4 mg, Intravenous, Q6H PRN, Sangeeta Sesay CHIDI Greer-JEFFREY    oxyCODONE (ROXICODONE) immediate release tablet 10 mg, 10 mg, Oral, Q4H PRN, Sangeeta Sesay CHIDI Greer-JEFFREY    oxyCODONE (ROXICODONE) IR tablet 5 mg, 5 mg, Oral, Q4H PRN, Sangeeta WoodsonCHIDI West-JEFFREY    rosuvastatin (CRESTOR) tablet 20 mg, 20 mg, Oral, HS, Sangeeta CHIDI Cutler-JEFFREY    sodium chloride 0.9 % bolus 500 mL, 500 mL, Intravenous, Once, Ace Mcgovern DO, Last Rate: 250 mL/hr at 07/24/24 0831, 500 mL at 07/24/24 0831    tetanus-diphtheria-acellular pertussis (BOOSTRIX) IM injection 0.5 mL, 0.5 mL, Intramuscular, Once, Ace Mcgovern DO    Current Outpatient Medications:     ALPRAZolam (XANAX) 0.25 mg tablet, TAKE 1+1/2 TABLETS BY MOUTH AT BEDTIME, Disp: 45 tablet, Rfl: 3    aspirin (ECOTRIN LOW STRENGTH) 81 mg EC tablet, Take 81 mg by mouth daily, Disp: , Rfl:     Co Q-10 50 MG, Take 50 mg by mouth, Disp: , Rfl:     levothyroxine 112 mcg tablet, Take 1 tablet (112 mcg total) by mouth daily in theearly morning, Disp: 90 tablet, Rfl: 0    Multiple Vitamins-Minerals (CENTRUM SILVER 50+MEN PO), Take 1 tablet by mouth daily, Disp: , Rfl:     omeprazole (PriLOSEC) 40 MG capsule, TAKE 1 CAPSULE BY MOUTH DAILY, Disp: 90 capsule, Rfl: 1    rosuvastatin (CRESTOR) 20 MG tablet, TAKE 1 TABLET BY MOUTH DAILY, Disp: 90 tablet, Rfl: 1    Blood Culture:   No results found for: \"BLOODCX\"    Wound Culture:   No results found for: \"WOUNDCULT\"    Ins and Outs:  No intake/output data recorded.          Physical Exam:   /75 (BP Location: Right arm)   Pulse 98   Temp 98.8 °F (37.1 °C) (Oral)   Resp 18   SpO2 98%   Gen: No acute distress, resting comfortably in bed  HEENT: Eyes clear, moist mucus membranes, hearing intact  Respiratory: No audible wheezing or stridor  Cardiovascular: Well Perfused peripherally, 2+ distal pulse  Abdomen: " nondistended, no peritoneal signs  Musculoskeletal: right upper extremity  Skin: please see wound images for full characterization of wounds.   Currently his right thumb is dressed with xeroform, 4x4 and an alumifoam splint, limiting evaluation of the right thumb.   He has no ttp through the index, ring, middle or small fingers. No pain through the hand or wrist.   ROM full through the wrist or hand.   SILT throughout.   Unable to examine the thumb with dressings in place.   2+ radial and ulnar pulse  Musculature is soft and compressible, no pain with passive stretch    Dressings left in place to the thumb per hand attg recommedations.     Tertiary: no tenderness over all other joints/long bones as except already stated.              Radiology:   I personally reviewed the films.  XRAY right thumb: first digit distal phalanx comminuted and displaced fracture, ventral dislocation.     _*_*_*_*_*_*_*_*_*_*_*_*_*_*_*_*_*_*_*_*_*_*_*_*_*_*_*_*_*_*_*_*_*_*_*_*_*_*_*_*_*    Assessment:  59 y.o.male with open right thumb fracture dislocation.      Plan:   NWB right hand in dressings.   For operative fixation today.   NPO for procedure  Consent obtained at bedside  Ancef given in ER  Ancef ordered pre operatively.   Tetanus given in ER.   Pre op labs reviewed: CBC/ CMP  Pain control.   Further recommendations pending OR today.   BMI 31.14 mildly obese. Recommend behavior modifications and nutrition.  Dispo: Ortho will follow  Case reviewed and discussed with Dr. Reyes.     Sangeeta Greer PA-C

## 2024-07-24 NOTE — OP NOTE
OPERATIVE REPORT  PATIENT NAME: Carlos Irving    :  1965  MRN: [id number]  Pt Location: [facility] OR ROOM 02    SURGERY DATE: 2024    Surgeons and Role:     * Kwabena Reyes MD - Primary     * Elsa Cook PA-C - Assisting    Preop Diagnosis:  Open avulsion fracture of phalanx of right thumb, initial encounter [S62.158B]    Post-Op Diagnosis Codes:     * Open avulsion fracture of phalanx of right thumb, initial encounter [P32.253U]    Procedure(s):  Right - INCISION AND DRAINAGE (I&D) EXTREMITY. OPEN REDUCTION & PINNING of distal phalanx fracture  Repair of Right thumb EPL tendon  Repair of right thumb nail bed germinal matrix    Specimen(s):  * No specimens in log *    Estimated Blood Loss:   Minimal    Drains:  * No LDAs found *    Anesthesia Type:   Choice    Operative Indications:  Open avulsion fracture of phalanx of right thumb, initial encounter [W72.952X]  Right thumb EPL tendon avulsion    Operative Findings:  Comminuted base of distal phalanx fracture of right thumb, open; Right thumb EPL tendon avulsion      Complications:   None    Procedure and Technique:  The right upper extremity was prepped and draped in a sterile fashion after placement of an upper arm tourniquet.  An Esmarch was used to exsanguinate the right upper extremity and the tourniquet was insufflated to 250 mmHg pressure.  The thumb was evaluated and noted to have a stellate dorsal laceration at the dorsal IP level extending up to the nail fold and into the nail plate and bed.  There were numerous pieces of bone in the wound.  The wound was copiously irrigated with normal saline and the small fragments of bone were removed.  There was a 1 larger fragment that had a reasonable amount of cartilaginous surface remaining.  This fragment was preserved.  The distal phalanx had a longitudinal fracture as well as some fragmentation to it as well.  It was determined there was at least some purchase could be obtained with a 0.045 inch K wire  driven through the tip of the distal phalanx and down towards the base.  The fragment that had remaining cartilaginous surface was then aligned with it and the K wire was passed through this fragment.  This was performed under direct visualization.  The K wire was then advanced into the head of the proximal phalanx and seated down towards its base.  C-arm fluoroscopy was used at regular intervals to confirm appropriate hardware placement as well as approximate overall alignment of this injury.  The wound was additionally irrigated normal saline.  The germinal matrix noted to be split longitudinally and this was approximated with 4-0 Vicryl in an interrupted figure-of-eight fashion.  The extensor tendon was avulsed off of the base of the distal phalanx due to the fact that this portion of the distal phalanx was missing.  The extensor tendon was then approximated to the proximal portion of the germinal matrix using the 4-0 Vicryl in a U-type stitch.  This did restore rough anatomic positioning of the EPL tendon insertion.  The overlying skin was then approximated with 4-0 Monocryl in interrupted simple fashion.  The overall goal as was stated to the patient preoperatively was to obtain rough alignment of the severely comminuted fractures in order to try to provide a stable post for thumb opposition.  It is uncertain if there will be much IP motion although there is some cartilaginous piece that could potentially allow for motion.  A digital block was then performed with Marcaine 0.25% plain.  Xeroform was applied over the dorsal portion of the distal thumb.  The wound was then dressed with 4 x 4 gauze followed by Webril overwrap.  3 inch Ortho-Glass thumb spica splint was then fabricated using 3 inch Ortho-Glass with an Ace bandage overwrap.  The tourniquet was released and the patient was extubated and transferred to recovery room in stable condition.   I was present for the entire procedure.    Patient  Disposition:  PACU     My Assistant was necessary throughout the procedure(s) for retraction and positioning.    I understand that section 1842 (b)(7)(D) of the Social Security Act generally prohibits Medicare physician fee schedule payment for the services of assistants-at-surgery in teaching hospitals when qualified residents are available to furnish such services. I certify that the services for which payment is claimed were medically necessary, and that no qualified resident was available to perform the services. I further understand that these services are subject to post-payment review by the Medicare carrier.      SIGNATURE: Kwabena Reyes MD  DATE: July 24, 2024  TIME: 5:52 PM

## 2024-07-24 NOTE — PLAN OF CARE
Problem: PAIN - ADULT  Goal: Verbalizes/displays adequate comfort level or baseline comfort level  Description: Interventions:  - Encourage patient to monitor pain and request assistance  - Assess pain using appropriate pain scale  - Administer analgesics based on type and severity of pain and evaluate response  - Implement non-pharmacological measures as appropriate and evaluate response  - Consider cultural and social influences on pain and pain management  - Notify physician/advanced practitioner if interventions unsuccessful or patient reports new pain  Outcome: Progressing     Problem: INFECTION - ADULT  Goal: Absence or prevention of progression during hospitalization  Description: INTERVENTIONS:  - Assess and monitor for signs and symptoms of infection  - Monitor lab/diagnostic results  - Monitor all insertion sites, i.e. indwelling lines, tubes, and drains  - Monitor endotracheal if appropriate and nasal secretions for changes in amount and color  - [place] appropriate cooling/warming therapies per order  - Administer medications as ordered  - Instruct and encourage patient and family to use good hand hygiene technique  - Identify and instruct in appropriate isolation precautions for identified infection/condition  Outcome: Progressing     Problem: DISCHARGE PLANNING  Goal: Discharge to home or other facility with appropriate resources  Description: INTERVENTIONS:  - Identify barriers to discharge w/patient and caregiver  - Arrange for needed discharge resources and transportation as appropriate  - Identify discharge learning needs (meds, wound care, etc.)  - Arrange for interpretive services to assist at discharge as needed  - Refer to Case Management Department for coordinating discharge planning if the patient needs post-hospital services based on physician/advanced practitioner order or complex needs related to functional status, cognitive ability, or social support system  Outcome: Progressing      Problem: Knowledge Deficit  Goal: Patient/family/caregiver demonstrates understanding of disease process, treatment plan, medications, and discharge instructions  Description: Complete learning assessment and assess knowledge base.  Interventions:  - Provide teaching at level of understanding  - Provide teaching via preferred learning methods  Outcome: Progressing

## 2024-07-24 NOTE — ANESTHESIA PREPROCEDURE EVALUATION
Procedure:  INCISION AND DRAINAGE (I&D) EXTREMITY, open reduction internal fixation right thumb, and all associated procedures. (Right: Finger)    Relevant Problems   ANESTHESIA (within normal limits)      CARDIO   (+) Hyperlipidemia   (-) Angina at rest   (-) Angina of effort   (-) Chest pain   (-) GIRALDO (dyspnea on exertion)      ENDO   (+) Hypothyroidism   (-) Diabetes mellitus type 1 (HCC)   (-) Diabetes mellitus, type 2 (HCC)      GI/HEPATIC   (+) GERD (gastroesophageal reflux disease)   (+) Rectal bleeding      /RENAL   (-) Chronic kidney disease      MUSCULOSKELETAL   (+) Impingement syndrome of left shoulder      NEURO/PSYCH   (+) Anxiety disorder   (-) CVA (cerebral vascular accident) (HCC)   (-) Seizures (HCC)      PULMONARY   (-) Asthma   (-) Chronic obstructive pulmonary disease (HCC)   (-) Sleep apnea        Physical Exam    Airway    Mallampati score: II  TM Distance: >3 FB  Neck ROM: full     Dental       Cardiovascular      Pulmonary      Other Findings        Anesthesia Plan  ASA Score- 2     Anesthesia Type- general with ASA Monitors.         Additional Monitors:     Airway Plan: LMA.           Plan Factors-Exercise tolerance (METS): >4 METS.    Chart reviewed.    Patient summary reviewed.                  Induction- intravenous.    Postoperative Plan-     Perioperative Resuscitation Plan - Level 1 - Full Code.       Informed Consent- Anesthetic plan and risks discussed with patient.  I personally reviewed this patient with the CRNA. Discussed and agreed on the Anesthesia Plan with the CRNA..

## 2024-07-24 NOTE — ANESTHESIA POSTPROCEDURE EVALUATION
Post-Op Assessment Note    CV Status:  Stable  Pain Score: 0    Pain management: adequate       Mental Status:  Arousable and sleepy   Hydration Status:  Euvolemic   PONV Controlled:  Controlled   Airway Patency:  Patent     Post Op Vitals Reviewed: Yes    No anethesia notable event occurred.    Staff: CRNA               BP   130/76   Temp 97.1   Pulse 51   Resp 13   SpO2 99

## 2024-07-24 NOTE — ED PROVIDER NOTES
History  Chief Complaint   Patient presents with    Finger Injury     Pt presents to the ED with finger injury to the right thumb. Pt reports caught hand in lili hammer.      HPI    59yoM, no pertinent pmhx, presenting to ER s/p lili hammer striking rt first digit resulting in obvious dislocation / laceration. Occurred PTA. No other trauma.     Prior to Admission Medications   Prescriptions Last Dose Informant Patient Reported? Taking?   ALPRAZolam (XANAX) 0.25 mg tablet 7/23/2024 Self No Yes   Sig: TAKE 1+1/2 TABLETS BY MOUTH AT BEDTIME   Co Q-10 50 MG 7/23/2024 Self Yes Yes   Sig: Take 50 mg by mouth   Multiple Vitamins-Minerals (CENTRUM SILVER 50+MEN PO) 7/23/2024 Self Yes Yes   Sig: Take 1 tablet by mouth daily   aspirin (ECOTRIN LOW STRENGTH) 81 mg EC tablet 7/23/2024 Self Yes Yes   Sig: Take 81 mg by mouth daily   levothyroxine 112 mcg tablet 7/24/2024  No Yes   Sig: Take 1 tablet (112 mcg total) by mouth daily in theearly morning   omeprazole (PriLOSEC) 40 MG capsule 7/23/2024 Self No Yes   Sig: TAKE 1 CAPSULE BY MOUTH DAILY   rosuvastatin (CRESTOR) 20 MG tablet 7/23/2024 Self No Yes   Sig: TAKE 1 TABLET BY MOUTH DAILY      Facility-Administered Medications: None       Past Medical History:   Diagnosis Date    Colon polyp     Disease of thyroid gland     GERD (gastroesophageal reflux disease)     Hyperlipidemia     Pulmonary embolism (HCC)     last assessed 1/10/2013    Snoring 04/25/2016       Past Surgical History:   Procedure Laterality Date    SC SURGICAL ARTHROSCOPY SHOULDER W/ROTATOR CUFF RPR Left 7/29/2022    Procedure: SHOULDER ARTHROSCOPIC ROTATOR CUFF REPAIR BICEPS TENODISIS;  Surgeon: Chino Fragoso MD;  Location: [facility] MAIN OR;  Service: Orthopedics    TONSILLECTOMY AND ADENOIDECTOMY         Family History   Problem Relation Age of Onset    Diabetes Mother         DM    Hypothyroidism Mother     Glaucoma Mother     Hypertension Mother     Alcohol abuse Sister     Drug abuse Sister      Hypothyroidism Sister     Lung cancer Maternal Grandmother      I have reviewed and agree with the history as documented.    E-Cigarette/Vaping    E-Cigarette Use Former User     Comments vapes       E-Cigarette/Vaping Substances    Nicotine No     THC No     CBD Yes     Flavoring Yes     Other No     Unknown No      Social History     Tobacco Use    Smoking status: Former     Current packs/day: 0.00     Types: Cigarettes     Quit date:      Years since quittin.5     Passive exposure: Past    Smokeless tobacco: Never   Vaping Use    Vaping status: Former    Substances: CBD, Flavoring   Substance Use Topics    Alcohol use: Yes     Comment: occ    Drug use: No       Review of Systems   Skin:  Positive for wound.         Physical Exam  Physical Exam  Constitutional:       General: He is not in acute distress.     Appearance: Normal appearance. He is not toxic-appearing or diaphoretic.   HENT:      Head: Normocephalic and atraumatic.      Nose: Nose normal.   Pulmonary:      Effort: Pulmonary effort is normal.   Musculoskeletal:         General: Normal range of motion.      Comments: Rt hand, first digit: posteromedial laceration through dermis, hemostatic. Distal phalangeal exposed with absence of posterior extensor tendon. 25% of digit lacerated - but inferior aspect intact and distally beyond interphalangeal joint - digit is warm and appears grossly perfused. Cap refill diminished. Sensation diminished. No obvious foreign body. Proximally, MCP intact but exam limited 2/2 distal pain/deformity. Hand spared.    Skin:     General: Skin is warm and dry.   Neurological:      General: No focal deficit present.      Mental Status: He is alert and oriented to person, place, and time.   Psychiatric:         Mood and Affect: Mood normal.         Vital Signs  ED Triage Vitals   Temperature Pulse Respirations Blood Pressure SpO2   24 0744 24 0744 24 0744 24 0744 24 0744   98.8 °F (37.1 °C)  98 18 125/75 98 %      Temp Source Heart Rate Source Patient Position - Orthostatic VS BP Location FiO2 (%)   07/24/24 0744 07/24/24 0744 07/24/24 0744 07/24/24 0744 --   Oral Monitor Sitting Right arm       Pain Score       07/24/24 0834       6           Vitals:    07/24/24 0744   BP: 125/75   Pulse: 98   Patient Position - Orthostatic VS: Sitting         Visual Acuity      ED Medications  Medications   sodium chloride 0.9 % bolus 500 mL (500 mL Intravenous New Bag 7/24/24 0831)   tetanus-diphtheria-acellular pertussis (BOOSTRIX) IM injection 0.5 mL (0.5 mL Intramuscular Not Given 7/24/24 0830)   levothyroxine tablet 112 mcg (has no administration in time range)   ALPRAZolam (XANAX) tablet 0.25 mg (has no administration in time range)   aspirin (ECOTRIN LOW STRENGTH) EC tablet 81 mg (has no administration in time range)   rosuvastatin (CRESTOR) tablet 20 mg (has no administration in time range)   omeprazole (PriLOSEC) delayed release capsule 40 mg (has no administration in time range)   ceFAZolin (ANCEF) IVPB (premix in dextrose) 2,000 mg 50 mL (2,000 mg Intravenous New Bag 7/24/24 0837)   morphine injection 4 mg (4 mg Intravenous Given 7/24/24 0834)   ondansetron (ZOFRAN) injection 4 mg (4 mg Intravenous Given 7/24/24 0833)   bupivacaine (PF) (MARCAINE) 0.25 % injection 10 mL (10 mL Infiltration Given by Other 7/24/24 0832)       Diagnostic Studies  Results Reviewed       Procedure Component Value Units Date/Time    Comprehensive metabolic panel [[id number]] Collected: 07/24/24 0830    Lab Status: Final result Specimen: Blood from Arm, Left Updated: 07/24/24 0858     Sodium 138 mmol/L      Potassium 4.0 mmol/L      Chloride 107 mmol/L      CO2 26 mmol/L      ANION GAP 5 mmol/L      BUN 24 mg/dL      Creatinine 0.83 mg/dL      Glucose 109 mg/dL      Calcium 9.0 mg/dL      AST 20 U/L      ALT 23 U/L      Alkaline Phosphatase 53 U/L      Total Protein 6.9 g/dL      Albumin 4.1 g/dL      Total Bilirubin 0.48 mg/dL       eGFR 95 ml/min/1.73sq m     Narrative:      National Kidney Disease Foundation guidelines for Chronic Kidney Disease (CKD):     Stage 1 with normal or high GFR (GFR > 90 mL/min/1.73 square meters)    Stage 2 Mild CKD (GFR = 60-89 mL/min/1.73 square meters)    Stage 3A Moderate CKD (GFR = 45-59 mL/min/1.73 square meters)    Stage 3B Moderate CKD (GFR = 30-44 mL/min/1.73 square meters)    Stage 4 Severe CKD (GFR = 15-29 mL/min/1.73 square meters)    Stage 5 End Stage CKD (GFR <15 mL/min/1.73 square meters)  Note: GFR calculation is accurate only with a steady state creatinine    CBC and differential [[id number]]  (Abnormal) Collected: 07/24/24 0830    Lab Status: Final result Specimen: Blood from Arm, Left Updated: 07/24/24 0845     WBC 5.06 Thousand/uL      RBC 4.63 Million/uL      Hemoglobin 14.5 g/dL      Hematocrit 41.7 %      MCV 90 fL      MCH 31.3 pg      MCHC 34.8 g/dL      RDW 13.2 %      MPV 8.2 fL      Platelets 244 Thousands/uL      nRBC 0 /100 WBCs      Segmented % 57 %      Immature Grans % 0 %      Lymphocytes % 28 %      Monocytes % 12 %      Eosinophils Relative 2 %      Basophils Relative 1 %      Absolute Neutrophils 2.91 Thousands/µL      Absolute Immature Grans 0.02 Thousand/uL      Absolute Lymphocytes 1.42 Thousands/µL      Absolute Monocytes 0.60 Thousand/µL      Eosinophils Absolute 0.08 Thousand/µL      Basophils Absolute 0.03 Thousands/µL                    XR thumb 2 views RIGHT   Final Result by Constantine Martin MD (07/24 0831)      First digit fracture dislocation.               Workstation performed: [id number]                    Procedures  Digital Block    Date/Time: 7/24/2024 9:15 AM    Performed by: Ace Mcgovern DO  Authorized by: Ace Mcgovern DO    Consent:     Consent obtained:  Verbal    Consent given by:  Patient    Risks discussed:  Allergic reaction, infection, intravascular injection, bleeding, nerve damage, pain, unsuccessful block and swelling    Alternatives discussed:  No  treatment, delayed treatment, alternative treatment and referral  Indications:     Indications:  Pain relief  Location:     Block location:  Finger    Finger blocked:  R thumb  Pre-procedure details:     Neurovascular status: deficits      Skin preparation:  Alcohol  Procedure details (see MAR for exact dosages):     Syringe type:  Controlled syringe    Needle gauge:  27 G    Anesthetic injected:  Bupivacaine 0.25% w/o epi    Technique:  Four-sided ring block    Injection procedure:  Anatomic landmarks identified  Post-procedure details:     Outcome:  Pain improved    Patient tolerance of procedure:  Tolerated well, no immediate complications           ED Course  ED Course as of 07/24/24 0918   Wed Jul 24, 2024   0806 Late entry: Hand made aware. Abx, tdap, XR ordered. Marcaine block to be employed w/ gross relocation and splinting thereafter pending hand's impression                                                 Medical Decision Making  59yoM presenting with fx/dislocation to rt first digit requiring OR intervention. XR with same. Reviewed with hand who accept to their service for OR. Pt provided ancef, fluids, digital block and global anaesthesia. Labs performed given OR. No further questions via pt and wife (RN). Care accepted to Hand.     Amount and/or Complexity of Data Reviewed  Independent Historian: spouse  External Data Reviewed: notes.  Labs: ordered.  Radiology: ordered and independent interpretation performed.    Risk  Prescription drug management.  Decision regarding hospitalization.                 Disposition  Final diagnoses:   Thumb dislocation   Open avulsion fracture of phalanx of right thumb, initial encounter     Time reflects when diagnosis was documented in both MDM as applicable and the Disposition within this note       Time User Action Codes Description Comment    7/24/2024  8:28 AM Ace Mcgovern Add [S63.106A] Thumb dislocation     7/24/2024  8:28 AM Ace Mcgovern Add [S62.501B] Open  avulsion fracture of phalanx of right thumb, initial encounter           ED Disposition       ED Disposition   Send to OR    Condition   --    Date/Time   Wed Jul 24, 2024  8:29 AM    Comment   --             Follow-up Information    None         Patient's Medications   Discharge Prescriptions    No medications on file       No discharge procedures on file.    PDMP Review         Value Time User    PDMP Reviewed  Yes 3/13/2023  8:40 AM Tereza Nugent MD            ED Provider  Electronically Signed by             Ace Mcgovern DO  07/24/24 0918

## 2024-07-24 NOTE — PLAN OF CARE
Problem: PAIN - ADULT  Goal: Verbalizes/displays adequate comfort level or baseline comfort level  Description: Interventions:  - Encourage patient to monitor pain and request assistance  - Assess pain using appropriate pain scale  - Administer analgesics based on type and severity of pain and evaluate response  - Implement non-pharmacological measures as appropriate and evaluate response  - Consider cultural and social influences on pain and pain management  - Notify physician/advanced practitioner if interventions unsuccessful or patient reports new pain  7/24/2024 1838 by Mickie Rosa RN  Outcome: Adequate for Discharge  7/24/2024 1442 by Mickie Rosa RN  Outcome: Progressing     Problem: INFECTION - ADULT  Goal: Absence or prevention of progression during hospitalization  Description: INTERVENTIONS:  - Assess and monitor for signs and symptoms of infection  - Monitor lab/diagnostic results  - Monitor all insertion sites, i.e. indwelling lines, tubes, and drains  - Monitor endotracheal if appropriate and nasal secretions for changes in amount and color  - [place] appropriate cooling/warming therapies per order  - Administer medications as ordered  - Instruct and encourage patient and family to use good hand hygiene technique  - Identify and instruct in appropriate isolation precautions for identified infection/condition  7/24/2024 1838 by Mickie Rosa RN  Outcome: Adequate for Discharge  7/24/2024 1442 by Mickie Rosa RN  Outcome: Progressing     Problem: DISCHARGE PLANNING  Goal: Discharge to home or other facility with appropriate resources  Description: INTERVENTIONS:  - Identify barriers to discharge w/patient and caregiver  - Arrange for needed discharge resources and transportation as appropriate  - Identify discharge learning needs (meds, wound care, etc.)  - Arrange for interpretive services to assist at discharge as needed  - Refer to Case Management Department for  coordinating discharge planning if the patient needs post-hospital services based on physician/advanced practitioner order or complex needs related to functional status, cognitive ability, or social support system  7/24/2024 1838 by Mickie Rosa RN  Outcome: Adequate for Discharge  7/24/2024 1442 by Mickie Rosa RN  Outcome: Progressing     Problem: Knowledge Deficit  Goal: Patient/family/caregiver demonstrates understanding of disease process, treatment plan, medications, and discharge instructions  Description: Complete learning assessment and assess knowledge base.  Interventions:  - Provide teaching at level of understanding  - Provide teaching via preferred learning methods  7/24/2024 1838 by Mickie Rosa RN  Outcome: Adequate for Discharge  7/24/2024 1442 by Mickie Rosa RN  Outcome: Progressing

## 2024-07-25 ENCOUNTER — TRANSITIONAL CARE MANAGEMENT (OUTPATIENT)
Dept: FAMILY MEDICINE CLINIC | Facility: CLINIC | Age: 59
End: 2024-07-25

## 2024-07-25 ENCOUNTER — TELEPHONE (OUTPATIENT)
Dept: FAMILY MEDICINE CLINIC | Facility: CLINIC | Age: 59
End: 2024-07-25

## 2024-07-25 NOTE — UTILIZATION REVIEW
Initial Clinical Review    Admission: Date/Time/Statement:   Admission Orders (From admission, onward)       Ordered        07/24/24 0918  Inpatient Admission  Once                          Orders Placed This Encounter   Procedures    Inpatient Admission     Standing Status:   Standing     Number of Occurrences:   1     Order Specific Question:   Level of Care     Answer:   Med Surg [16]     Order Specific Question:   Estimated length of stay     Answer:   More than 2 Midnights     Order Specific Question:   Certification     Answer:   I certify that inpatient services are medically necessary for this patient for a duration of greater than two midnights. See H&P and MD Progress Notes for additional information about the patient's course of treatment.     ED Arrival Information       Expected   -    Arrival   7/24/2024 07:42    Acuity   Emergent              Means of arrival   Walk-In    Escorted by   Family Member    Service   Orthopedic Surgery    Admission type   Emergency              Arrival complaint   Finger lac             Chief Complaint   Patient presents with    Finger Injury     Pt presents to the ED with finger injury to the right thumb. Pt reports caught hand in lili hammer.        Initial Presentation: 59 y.o. male who presents s/p accident with a lili hammer at work today. He notes that he was working with the lili hammer when the bit broke loose, resulting in injury to the right thumb. He denies injury to any other location, and only an isolated right thumb injury. He has been washed out, cleansed and splinted by ER prior to hand surgery arrival. His PMH is inclusive of thyroid disorder, GERD, hyperlipidemia, PE (on aspirin daily). Plan: Inpatient admission for evaluation and treatment of right thumb pain: NWB to right hand, NPO for operative fixation, IV Ancef, pain control.        Procedure(s):  Right - INCISION AND DRAINAGE (I&D) EXTREMITY. OPEN REDUCTION & PINNING of distal phalanx  fracture  Repair of Right thumb EPL tendon  Repair of right thumb nail bed germinal matrix    Pt discharged home on 7/24 post procedure.      ED Triage Vitals   Temperature Pulse Respirations Blood Pressure SpO2 Pain Score   07/24/24 0744 07/24/24 0744 07/24/24 0744 07/24/24 0744 07/24/24 0744 07/24/24 0834   98.8 °F (37.1 °C) 98 18 125/75 98 % 6     Weight (last 2 days) before discharge       Date/Time Weight    07/24/24 1542 93.9 (207)            Vital Signs (last 3 days) before discharge       Date/Time Temp Pulse Resp BP MAP (mmHg) SpO2 O2 Device Cardiac (WDL) Patient Position - Orthostatic VS [place] Coma Scale Score Pain    07/24/24 1825 97.6 °F (36.4 °C) 56 17 133/81 -- 95 % None (Room air) -- -- -- --    07/24/24 1815 -- 52 17 124/80 97 99 % None (Room air) -- -- -- No Pain    07/24/24 1800 -- 48 17 127/77 97 97 % None (Room air) -- -- -- No Pain    07/24/24 1753 97.1 °F (36.2 °C) 50 17 130/76 98 99 % None (Room air) WDL -- -- --    07/24/24 1542 97.1 °F (36.2 °C) 47 18 158/88 -- 100 % None (Room air) -- -- -- No Pain    07/24/24 1508 -- -- -- -- -- -- -- -- -- 15 7    07/24/24 1502 -- -- -- -- -- -- -- -- -- -- 6    07/24/24 1439 97.6 °F (36.4 °C) 53 18 145/85 109 97 % None (Room air) -- Sitting -- --    07/24/24 1305 -- -- -- -- -- -- -- -- -- -- 7    07/24/24 1257 -- 52 18 117/66 86 98 % None (Room air) -- Sitting -- --    07/24/24 0840 -- -- -- -- -- -- None (Room air) -- -- -- --    07/24/24 0834 -- -- -- -- -- -- -- -- -- -- 6    07/24/24 0830 -- -- -- -- -- -- -- -- -- 15 --    07/24/24 0744 98.8 °F (37.1 °C) 98 18 125/75 94 98 % -- -- Sitting -- --              Pertinent Labs/Diagnostic Test Results:   Radiology:  XR thumb right first digit-min 2v   Final Interpretation by Dimitri Tobar MD (07/25 0922)      Fluoroscopic guidance provided for procedure guidance.  Please refer to the separate procedure notes for additional details.         Workstation performed: [id number]         XR thumb 2  views RIGHT   Final Interpretation by Constantine Martin MD (07/24 0831)      First digit fracture dislocation.               Workstation performed: [id number]           Cardiology:  No orders to display     GI:  No orders to display           Results from last 7 days   Lab Units 07/24/24  0830   WBC Thousand/uL 5.06   HEMOGLOBIN g/dL 14.5   HEMATOCRIT % 41.7   PLATELETS Thousands/uL 244   TOTAL NEUT ABS Thousands/µL 2.91         Results from last 7 days   Lab Units 07/24/24  0830   SODIUM mmol/L 138   POTASSIUM mmol/L 4.0   CHLORIDE mmol/L 107   CO2 mmol/L 26   ANION GAP mmol/L 5   BUN mg/dL 24   CREATININE mg/dL 0.83   EGFR ml/min/1.73sq m 95   CALCIUM mg/dL 9.0     Results from last 7 days   Lab Units 07/24/24  0830   AST U/L 20   ALT U/L 23   ALK PHOS U/L 53   TOTAL PROTEIN g/dL 6.9   ALBUMIN g/dL 4.1   TOTAL BILIRUBIN mg/dL 0.48         Results from last 7 days   Lab Units 07/24/24  0830   GLUCOSE RANDOM mg/dL 109                 ED Treatment-Medication Administration from 07/24/2024 0742 to 07/24/2024 1436         Date/Time Order Dose Route Action     07/24/2024 0837 ceFAZolin (ANCEF) IVPB (premix in dextrose) 2,000 mg 50 mL 2,000 mg Intravenous New Bag     07/24/2024 0834 morphine injection 4 mg 4 mg Intravenous Given     07/24/2024 0833 ondansetron (ZOFRAN) injection 4 mg 4 mg Intravenous Given     07/24/2024 0831 sodium chloride 0.9 % bolus 500 mL 500 mL Intravenous New Bag     07/24/2024 0832 bupivacaine (PF) (MARCAINE) 0.25 % injection 10 mL 10 mL Infiltration Given by Other     07/24/2024 1305 oxyCODONE (ROXICODONE) immediate release tablet 10 mg 10 mg Oral Given            Past Medical History:   Diagnosis Date    Colon polyp     Disease of thyroid gland     GERD (gastroesophageal reflux disease)     Hyperlipidemia     Pulmonary embolism (HCC)     last assessed 1/10/2013    Snoring 04/25/2016     Present on Admission:  **None**      Admitting Diagnosis: Thumb dislocation [S63.106A]  Thumb laceration  [S61.019A]  Open avulsion fracture of phalanx of right thumb, initial encounter [S62.501B]  Age/Sex: 59 y.o. male  Admission Orders:  Scheduled Medications:  No current facility-administered medications for this encounter.    Continuous IV Infusions:  No current facility-administered medications for this encounter.    PRN Meds:  No current facility-administered medications for this encounter.      IP CONSULT TO HAND SURGERY    Network Utilization Review Department  ATTENTION: Please call with any questions or concerns to [phone] and carefully listen to the prompts so that you are directed to the right person. All voicemails are confidential.   For Discharge needs, contact Care Management DC Support Team at [phone] opt. 2  Send all requests for admission clinical reviews, approved or denied determinations and any other requests to dedicated fax number below belonging to the campus where the patient is receiving treatment. List of dedicated fax numbers for the Facilities:  FACILITY NAME UR FAX NUMBER   ADMISSION DENIALS (Administrative/Medical Necessity) [phone]   DISCHARGE SUPPORT TEAM (NETWORK) [phone]   PARENT CHILD HEALTH (Maternity/NICU/Pediatrics) [phone]   [facility] [phone]   [facility] [phone]   [facility] [phone]   [facility] [phone]   [facility] [phone]   [facility] [phone]   [facility] [phone]   [facility] [phone]   [facility] [phone]   [facility] [phone]   [facility] [phone]   [facility] [phone]

## 2024-07-29 ENCOUNTER — TELEPHONE (OUTPATIENT)
Dept: OBGYN CLINIC | Facility: HOSPITAL | Age: 59
End: 2024-07-29

## 2024-07-29 NOTE — TELEPHONE ENCOUNTER
Caller: Svitlana Dobson    Doctor/Office: Dr Herrera    CB#: N/A      What needs to be faxed: Work letter 7/29/24    ATTN to: Svitlana    Fax#: [phone]      Documents were successfully e-faxed

## 2024-08-01 ENCOUNTER — PROCEDURE VISIT (OUTPATIENT)
Age: 59
End: 2024-08-01
Payer: COMMERCIAL

## 2024-08-01 VITALS
SYSTOLIC BLOOD PRESSURE: 123 MMHG | DIASTOLIC BLOOD PRESSURE: 73 MMHG | HEART RATE: 72 BPM | HEIGHT: 70 IN | WEIGHT: 207 LBS | BODY MASS INDEX: 29.63 KG/M2

## 2024-08-01 DIAGNOSIS — M99.03 SEGMENTAL DYSFUNCTION OF LUMBAR REGION: ICD-10-CM

## 2024-08-01 DIAGNOSIS — M54.59 MECHANICAL LOW BACK PAIN: ICD-10-CM

## 2024-08-01 DIAGNOSIS — M54.2 NECK PAIN: ICD-10-CM

## 2024-08-01 DIAGNOSIS — M99.02 SEGMENTAL DYSFUNCTION OF THORACIC REGION: Primary | ICD-10-CM

## 2024-08-01 DIAGNOSIS — M99.04 SEGMENTAL DYSFUNCTION OF SACRAL REGION: ICD-10-CM

## 2024-08-01 DIAGNOSIS — M99.01 SEGMENTAL DYSFUNCTION OF CERVICAL REGION: ICD-10-CM

## 2024-08-01 PROCEDURE — 98941 CHIROPRACT MANJ 3-4 REGIONS: CPT | Performed by: CHIROPRACTOR

## 2024-08-01 PROCEDURE — 97110 THERAPEUTIC EXERCISES: CPT | Performed by: CHIROPRACTOR

## 2024-08-01 NOTE — PROGRESS NOTES
Diagnoses and all orders for this visit:    Segmental dysfunction of thoracic region    Segmental dysfunction of lumbar region    Neck pain    Segmental dysfunction of cervical region    Segmental dysfunction of sacral region    Mechanical low back pain      ASSESSMENT:  Pt's symptoms and exam findings consistent with mechanical neck pain secondary to repetitive st/sp injury of discogenic origin. Pt responded well to extension biased stretches and manual mobilization of the affected spinal and myofascial tissues with increased ROM; trial of conservative tx recommended consisting on Melony extension biased exercises, graded mobilization/manipulation of the affected tissues, postural/ergonomic education and take home stretches/exercises. If symptoms fail to centralize or neurologic deficit presents, appropriate imaging and referral will be coordinated.  -Pt has exacerbation of back pain without red flags. Tolerated treatment well with some improvements in pain and mm spasm  - The patient tolerated treatment fairly well but F/U in one week.    CODES: 66132 and 38769    TREATMENT:  Fear avoidance behavior discussion, encouraged and reassured pt that natural course of condition is to improve over time with adherence to tx plan and home care strategies. Home care recommendations: walk to tolerance, gradual return to activity to tolerance (avoid anything that peripheralizes symptoms), call if symptoms peripheralize, worsen, or neurologic deficit progresses. Postural education, avoid heavy lifting, and prolonged cervical flexion. Use cervical roll as recommended, Use of lumbar roll as recommended, Ther-ex: IASTM to affected mm hypertonicities (discussed soreness/ecchymosis up to 36 hrs post procedure); cervical axial retraction, Brueggers position, seated scapular retraction, greater than 15 spent on above mentioned ther-ex. Cervical mobilization/ long axis traction- manual with chin retraction, Thoracic  mobilization/manipulation: prone P-A mob, Prone A-P CT manip; Lumbar mobilization- Flexion distraction, Sacrum. L SIJ- Long axis distraction    HPI  Carlos Irving is a 59 y.o. male  Chief Complaint   Patient presents with   • Neck Pain     About an 8 bilateral due to arm injury very tight    • Back Pain     Low back feeling better 4      The patient presents to the office with neck and arm pain that has been an issue for several years. No specific trauma. That patient reports she has L shoulder surgery two years ago. The patient reports two weeks ago he has some pain in the L sided again without specific incident. The patient is an  and now works with [employer]. Less physical than before. Usually lifts weights but work on elastic bands and its helping.   1/22- The patient tolerated traetment well but some HA with last appt but subsided after a day and continued to work out.   2/5- The patient is feeling a little better since his last visit. Tight in the neck and lower back  4/30- The patient presents to the office with increase in pain into the base of the neck that feels sharp and a little into the L shoulder. The patient was exercises in his normal way and there was no trauma. No pain in to the UE.  5/14- The pt mentions his neck was improving but then a few days ago was trying to stretch and then pulled a muscle and then had a hard time turning.  5/30- Flare up of L sided pain and lateral hip pain after throwing around rocks at his camp site.   6/18- The patient is feeling some continued pain in L lower back and lateral hip since a few weeks ago, the pain is going up and down. Not taking any NSAIDs at this time for the pain.  7/11- The pt reports his neck and upper back tight but the pain has improved with continuing to work out.  8/1- The patient is feeling more sore in the neck and upper back after dealing with his R thumb injury last week which involved surgery.     Neck Pain   This is a  chronic problem. The current episode started more than 1 year ago. The problem occurs 2 to 4 times per day. The problem has been waxing and waning. The pain is associated with lifting a heavy object. Associated symptoms include leg pain.   Shoulder Pain   The pain is at a severity of 10/10.   Back Pain  Associated symptoms include leg pain.   Leg Pain       Past Medical History:   Diagnosis Date   • Colon polyp    • Disease of thyroid gland    • GERD (gastroesophageal reflux disease)    • Hyperlipidemia    • Pulmonary embolism (HCC)     last assessed 1/10/2013   • Snoring 04/25/2016      Past Surgical History:   Procedure Laterality Date   • INCISION AND DRAINAGE OF WOUND Right 7/24/2024    Procedure: INCISION AND DRAINAGE (I&D) EXTREMITY, OPEN REDUCTION & PINNING;  Surgeon: Kwabena Reyes MD;  Location: [facility];  Service: Orthopedics   • NV SURGICAL ARTHROSCOPY SHOULDER W/ROTATOR CUFF RPR Left 7/29/2022    Procedure: SHOULDER ARTHROSCOPIC ROTATOR CUFF REPAIR BICEPS TENODISIS;  Surgeon: Chino Fragoso MD;  Location: [facility];  Service: Orthopedics   • TONSILLECTOMY AND ADENOIDECTOMY       The following portions of the patient's history were reviewed and updated as appropriate: allergies, past family history, past medical history, past social history, past surgical history, and problem list.  Review of Systems   Musculoskeletal:  Positive for back pain and neck pain.     Physical Exam  Musculoskeletal:         General: Tenderness present.      Cervical back: Rigidity, spasms, tenderness and crepitus present. No swelling, edema, deformity, erythema, signs of trauma, lacerations, torticollis or bony tenderness. Pain with movement present. Decreased range of motion.      Thoracic back: Spasms and tenderness present. No swelling, edema, deformity, signs of trauma, lacerations or bony tenderness. Decreased range of motion. No scoliosis.      Lumbar back: Spasms and tenderness present. No swelling, edema,  deformity, signs of trauma, lacerations or bony tenderness. Decreased range of motion. No scoliosis.        Back:    Neurological:      Gait: Gait is intact.      Deep Tendon Reflexes: Reflexes are normal and symmetric.       SOFT TISSUE ASSESSMENT: Hypertonicity and tenderness palpated B C4-T2 T10-S1, L SIJ Upper trap, levatory scap, L SCM, rhomboids, erector spinae, hip flexor, glute med/min JOINT RESTRICTIONS:C4-T2 T10-Ss. L SIJ ORTHO:Melony repeated flexion peripheralizes, extension centralizes;maximal foraminal compression-neg,cervical distraction- relieving symptoms; Spurlings negative for reproduction of radicular symptoms, SLR- Neg, Sherif- neg, FABERE- neg, Sitting ROOT- neg, SLUMP- Neg for neural tension    Return in about 1 week (around 8/8/2024) for Recheck.

## 2024-08-14 ENCOUNTER — OFFICE VISIT (OUTPATIENT)
Dept: OBGYN CLINIC | Facility: CLINIC | Age: 59
End: 2024-08-14

## 2024-08-14 VITALS
BODY MASS INDEX: 29.63 KG/M2 | WEIGHT: 207 LBS | HEIGHT: 70 IN | DIASTOLIC BLOOD PRESSURE: 60 MMHG | SYSTOLIC BLOOD PRESSURE: 116 MMHG

## 2024-08-14 DIAGNOSIS — S62.501D: Primary | ICD-10-CM

## 2024-08-14 PROCEDURE — 99024 POSTOP FOLLOW-UP VISIT: CPT | Performed by: SURGERY

## 2024-08-14 NOTE — PROGRESS NOTES
Assessment/Plan:  Patient ID: Carlos Irving 59 y.o. male   Surgery: Incision And Drainage (i&d) Extremity, Open Reduction & Pinning - Right  Date of Surgery: 7/24/2024    Surgical site inspected. No signs of infection  Will provided new fabricated splint he may remove to bathe  No lifting ,pushing, pulling or weight bearing. Work note provided to return with splint on  Encouraged finger motion of free fingers. No AROM of thumb  NSAID's as needed    Follow Up:  2  week(s)    To Do Next Visit:  X-rays of the  right  thumb      CHIEF COMPLAINT:  Chief Complaint   Patient presents with    Right Hand - Post-op         SUBJECTIVE:  Carlos Irving is a 59 y.o. year old male who presents for follow up after Incision And Drainage (i&d) Extremity, Open Reduction & Pinning - Right. Today patient has Minimal pain . Denies fever or chills      PHYSICAL EXAMINATION:  General: well developed and well nourished, alert, oriented times 3, and appears comfortable  Psychiatric: Normal    MUSCULOSKELETAL EXAMINATION:  Incision: Clean, dry, intact and Pin in place  Surgery Site: normal, no evidence of infection  and scabbing noted  Range of Motion: As expected  Neurovascular status: Neuro intact, good cap refill and radial pulse 2+  Activity Restrictions: Cast/splint restrictions          STUDIES REVIEWED:  No new images obtained    LABS REVIEWED:    HgA1c:   Lab Results   Component Value Date    HGBA1C 5.9 (H) 07/11/2024     BMP:   Lab Results   Component Value Date    GLUCOSE 112 04/13/2015    CALCIUM 9.0 07/24/2024     04/13/2015    K 4.0 07/24/2024    CO2 26 07/24/2024     07/24/2024    BUN 24 07/24/2024    CREATININE 0.83 07/24/2024           PROCEDURES PERFORMED:  Procedures  No Procedures performed today    Scribe Attestation      I,:  Oscar Young am acting as a scribe while in the presence of the attending physician.:       I,:  Kwabena Reyes MD personally performed the services described in this documentation    as  scribed in my presence.:

## 2024-08-14 NOTE — LETTER
August 14, 2024     Patient: Carlos Irving  YOB: 1965  Date of Visit: 8/14/2024      To Whom it May Concern:    Carlos Irving is under my professional care. Carlos was seen in my office on 8/19/2024. Carlos may return to work with limitations he may work with the splint on. No lifting, pushing, pulling or weight bearing through Right hand .    If you have any questions or concerns, please don't hesitate to call.         Sincerely,          Kwabena Reyes MD

## 2024-08-15 ENCOUNTER — TELEPHONE (OUTPATIENT)
Age: 59
End: 2024-08-15

## 2024-08-15 NOTE — TELEPHONE ENCOUNTER
Caller: Svitlana from [employer]    Doctor: Eric     Reason for call: electronically faxed 8/1 OVN and work status to fax # [phone]     Call back#: [phone]

## 2024-08-22 ENCOUNTER — PROCEDURE VISIT (OUTPATIENT)
Age: 59
End: 2024-08-22
Payer: COMMERCIAL

## 2024-08-22 VITALS — HEIGHT: 70 IN | WEIGHT: 207 LBS | BODY MASS INDEX: 29.63 KG/M2

## 2024-08-22 DIAGNOSIS — M99.01 SEGMENTAL DYSFUNCTION OF CERVICAL REGION: ICD-10-CM

## 2024-08-22 DIAGNOSIS — M99.03 SEGMENTAL DYSFUNCTION OF LUMBAR REGION: ICD-10-CM

## 2024-08-22 DIAGNOSIS — M54.59 MECHANICAL LOW BACK PAIN: ICD-10-CM

## 2024-08-22 DIAGNOSIS — M99.04 SEGMENTAL DYSFUNCTION OF SACRAL REGION: ICD-10-CM

## 2024-08-22 DIAGNOSIS — M99.02 SEGMENTAL DYSFUNCTION OF THORACIC REGION: Primary | ICD-10-CM

## 2024-08-22 DIAGNOSIS — M54.2 NECK PAIN: ICD-10-CM

## 2024-08-22 PROCEDURE — 98941 CHIROPRACT MANJ 3-4 REGIONS: CPT | Performed by: CHIROPRACTOR

## 2024-08-22 PROCEDURE — 97110 THERAPEUTIC EXERCISES: CPT | Performed by: CHIROPRACTOR

## 2024-08-22 NOTE — LETTER
August 22, 2024     Patient: Carlos Irving  YOB: 1965  Date of Visit: 8/22/2024      To Whom it May Concern:    Carlos Irving is under my professional care. Carlos was seen in my office on 8/22/2024. Carlos was at our office for treatment today 8/22/24.    If you have any questions or concerns, please don't hesitate to call.         Sincerely,          Juanita Nugent DC        CC: No Recipients

## 2024-08-22 NOTE — PROGRESS NOTES
Diagnoses and all orders for this visit:    Segmental dysfunction of thoracic region    Segmental dysfunction of lumbar region    Neck pain    Segmental dysfunction of cervical region    Segmental dysfunction of sacral region    Mechanical low back pain      ASSESSMENT:  Pt's symptoms and exam findings consistent with mechanical neck pain secondary to repetitive st/sp injury of discogenic origin. Pt responded well to extension biased stretches and manual mobilization of the affected spinal and myofascial tissues with increased ROM; trial of conservative tx recommended consisting on Melony extension biased exercises, graded mobilization/manipulation of the affected tissues, postural/ergonomic education and take home stretches/exercises. If symptoms fail to centralize or neurologic deficit presents, appropriate imaging and referral will be coordinated.  -Pt has exacerbation of back pain without red flags. Tolerated treatment well with some improvements in pain and mm spasm  - The patient tolerated treatment fairly well but F/U in one week.    CODES: 07717 and 15094    TREATMENT:  Fear avoidance behavior discussion, encouraged and reassured pt that natural course of condition is to improve over time with adherence to tx plan and home care strategies. Home care recommendations: walk to tolerance, gradual return to activity to tolerance (avoid anything that peripheralizes symptoms), call if symptoms peripheralize, worsen, or neurologic deficit progresses. Postural education, avoid heavy lifting, and prolonged cervical flexion. Use cervical roll as recommended, Use of lumbar roll as recommended, Ther-ex: IASTM to affected mm hypertonicities (discussed soreness/ecchymosis up to 36 hrs post procedure); cervical axial retraction, Brueggers position, seated scapular retraction, greater than 15 spent on above mentioned ther-ex. Cervical mobilization/ long axis traction- manual with chin retraction, Thoracic  mobilization/manipulation: prone P-A mob, Prone A-P CT manip; Lumbar mobilization- Flexion distraction, Sacrum. L SIJ- Long axis distraction    HPI  Carlos Irving is a 59 y.o. male  Chief Complaint   Patient presents with   • Neck Pain     Neck pain about an 8 left side is worse    • Back Pain     Low back not too bad about a 5      The patient presents to the office with neck and arm pain that has been an issue for several years. No specific trauma. That patient reports she has L shoulder surgery two years ago. The patient reports two weeks ago he has some pain in the L sided again without specific incident. The patient is an  and now works with [employer]. Less physical than before. Usually lifts weights but work on elastic bands and its helping.   1/22- The patient tolerated traetment well but some HA with last appt but subsided after a day and continued to work out.   2/5- The patient is feeling a little better since his last visit. Tight in the neck and lower back  4/30- The patient presents to the office with increase in pain into the base of the neck that feels sharp and a little into the L shoulder. The patient was exercises in his normal way and there was no trauma. No pain in to the UE.  5/14- The pt mentions his neck was improving but then a few days ago was trying to stretch and then pulled a muscle and then had a hard time turning.  5/30- Flare up of L sided pain and lateral hip pain after throwing around rocks at his camp site.   6/18- The patient is feeling some continued pain in L lower back and lateral hip since a few weeks ago, the pain is going up and down. Not taking any NSAIDs at this time for the pain.  7/11- The pt reports his neck and upper back tight but the pain has improved with continuing to work out.  8/1- The patient is feeling more sore in the neck and upper back after dealing with his R thumb injury last week which involved surgery.   8/22- The patient has increase  pain in the lower  back and into the skull. Very tight on both sides.    Neck Pain   This is a chronic problem. The current episode started more than 1 year ago. The problem occurs 2 to 4 times per day. The problem has been waxing and waning. The pain is associated with lifting a heavy object. Associated symptoms include leg pain.   Shoulder Pain   The pain is at a severity of 10/10.   Back Pain  Associated symptoms include leg pain.   Leg Pain       Past Medical History:   Diagnosis Date   • Colon polyp    • Disease of thyroid gland    • GERD (gastroesophageal reflux disease)    • Hyperlipidemia    • Pulmonary embolism (HCC)     last assessed 1/10/2013   • Snoring 04/25/2016      Past Surgical History:   Procedure Laterality Date   • INCISION AND DRAINAGE OF WOUND Right 7/24/2024    Procedure: INCISION AND DRAINAGE (I&D) EXTREMITY, OPEN REDUCTION & PINNING;  Surgeon: Kwabena Reyes MD;  [facility];  Service: Orthopedics   • [place] SURGICAL ARTHROSCOPY SHOULDER W/ROTATOR CUFF RPR Left 7/29/2022    Procedure: SHOULDER ARTHROSCOPIC ROTATOR CUFF REPAIR BICEPS TENODISIS;  Surgeon: Chino Fragoso MD;  Location: [facility];  Service: Orthopedics   • TONSILLECTOMY AND ADENOIDECTOMY       The following portions of the patient's history were reviewed and updated as appropriate: allergies, past family history, past medical history, past social history, past surgical history, and problem list.  Review of Systems   Musculoskeletal:  Positive for back pain and neck pain.     Physical Exam  Musculoskeletal:         General: Tenderness present.      Cervical back: Rigidity, spasms, tenderness and crepitus present. No swelling, edema, deformity, erythema, signs of trauma, lacerations, torticollis or bony tenderness. Pain with movement present. Decreased range of motion.      Thoracic back: Spasms and tenderness present. No swelling, edema, deformity, signs of trauma, lacerations or bony tenderness. Decreased range  of motion. No scoliosis.      Lumbar back: Spasms and tenderness present. No swelling, edema, deformity, signs of trauma, lacerations or bony tenderness. Decreased range of motion. No scoliosis.        Back:    Neurological:      Gait: Gait is intact.      Deep Tendon Reflexes: Reflexes are normal and symmetric.       SOFT TISSUE ASSESSMENT: Hypertonicity and tenderness palpated B C4-T2 T10-S1, L SIJ Upper trap, levatory scap, L SCM, rhomboids, erector spinae, hip flexor, glute med/min JOINT RESTRICTIONS:C4-T2 T10-Ss. L SIJ ORTHO:Melony repeated flexion peripheralizes, extension centralizes;maximal foraminal compression-neg,cervical distraction- relieving symptoms; Spurlings negative for reproduction of radicular symptoms, SLR- Neg, Sherif- neg, FABERE- neg, Sitting ROOT- neg, SLUMP- Neg for neural tension    Return in about 1 week (around 8/29/2024) for Recheck.

## 2024-08-28 ENCOUNTER — OFFICE VISIT (OUTPATIENT)
Dept: OBGYN CLINIC | Facility: CLINIC | Age: 59
End: 2024-08-28

## 2024-08-28 VITALS
SYSTOLIC BLOOD PRESSURE: 110 MMHG | HEIGHT: 70 IN | BODY MASS INDEX: 29.63 KG/M2 | WEIGHT: 207 LBS | DIASTOLIC BLOOD PRESSURE: 62 MMHG

## 2024-08-28 DIAGNOSIS — S63.104D DISLOCATION OF RIGHT THUMB, SUBSEQUENT ENCOUNTER: Primary | ICD-10-CM

## 2024-08-28 DIAGNOSIS — S62.501D: ICD-10-CM

## 2024-08-28 PROCEDURE — 99024 POSTOP FOLLOW-UP VISIT: CPT | Performed by: SURGERY

## 2024-08-28 NOTE — PROGRESS NOTES
Assessment/Plan:  Patient ID: Carlos Irving 59 y.o. male   Surgery: Incision And Drainage (i&d) Extremity, Open Reduction & Pinning - Right  Date of Surgery: 7/24/2024    -Pin removed in office today.  -Discontinue splint immobilization.  -Limit thumb motion over the next 2 weeks, no lifting or firm grasping.  -Work note provided.    Follow Up: 2 weeks      To Do Next Visit:  Start more aggressive motion         CHIEF COMPLAINT:  Chief Complaint   Patient presents with    Right Hand - Post-op         SUBJECTIVE:  Carlos Irving is a 59 y.o. year old male who presents for follow up after Incision And Drainage (i&d) Extremity, Open Reduction & Pinning - Right.  Patient reports overall he is managing.  Has been splint compliant, but it is uncomfortable.  No severe pain, no fever/chills.    PHYSICAL EXAMINATION:  General: well developed and well nourished, alert, oriented times 3, and appears comfortable  Psychiatric: Normal    MUSCULOSKELETAL EXAMINATION:  Incision: Clean, dry, intact and Pin in place  Surgery Site: normal, no evidence of infection  and scabbing noted  Range of Motion: As expected  Neurovascular status: Neuro intact, good cap refill and radial pulse 2+  Activity Restrictions: Cast/splint restrictions          STUDIES REVIEWED:  X-rays of the right thumb today demonstrate maintained IP joint surface with unchanged alignment of fracture pattern with pin in place.    LABS REVIEWED:    HgA1c:   Lab Results   Component Value Date    HGBA1C 5.9 (H) 07/11/2024     BMP:   Lab Results   Component Value Date    GLUCOSE 112 04/13/2015    CALCIUM 9.0 07/24/2024     04/13/2015    K 4.0 07/24/2024    CO2 26 07/24/2024     07/24/2024    BUN 24 07/24/2024    CREATININE 0.83 07/24/2024           PROCEDURES PERFORMED:  Procedures  No Procedures performed today    Scribe Attestation      I,:  Florencio Manning PA-C am acting as a scribe while in the presence of the attending physician.:       I,:  Dr. Yuan  Eric personally performed the services described in this documentation    as scribed in my presence.:

## 2024-08-28 NOTE — LETTER
August 28, 2024     Patient: Carlos Irving  YOB: 1965  Date of Visit: 8/28/2024      To Whom it May Concern:     Carlos Irving is under my professional care. Carlos is to continue to work with limitations he may work with the splint on. No lifting, pushing, pulling or weight bearing through Right hand .  Re-evaluation in 2 weeks with further updates at that time.     If you have any questions or concerns, please don't hesitate to call.           Sincerely,            Kwabena Reyes MD      CC: No Recipients

## 2024-08-30 ENCOUNTER — TELEPHONE (OUTPATIENT)
Age: 59
End: 2024-08-30

## 2024-09-05 ENCOUNTER — PROCEDURE VISIT (OUTPATIENT)
Age: 59
End: 2024-09-05
Payer: COMMERCIAL

## 2024-09-05 VITALS
WEIGHT: 207 LBS | BODY MASS INDEX: 29.63 KG/M2 | SYSTOLIC BLOOD PRESSURE: 137 MMHG | DIASTOLIC BLOOD PRESSURE: 78 MMHG | HEART RATE: 80 BPM | HEIGHT: 70 IN

## 2024-09-05 DIAGNOSIS — M99.02 SEGMENTAL DYSFUNCTION OF THORACIC REGION: Primary | ICD-10-CM

## 2024-09-05 DIAGNOSIS — M54.59 MECHANICAL LOW BACK PAIN: ICD-10-CM

## 2024-09-05 DIAGNOSIS — M99.03 SEGMENTAL DYSFUNCTION OF LUMBAR REGION: ICD-10-CM

## 2024-09-05 DIAGNOSIS — M54.2 NECK PAIN: ICD-10-CM

## 2024-09-05 DIAGNOSIS — M99.01 SEGMENTAL DYSFUNCTION OF CERVICAL REGION: ICD-10-CM

## 2024-09-05 DIAGNOSIS — M99.04 SEGMENTAL DYSFUNCTION OF SACRAL REGION: ICD-10-CM

## 2024-09-05 PROCEDURE — 98941 CHIROPRACT MANJ 3-4 REGIONS: CPT | Performed by: CHIROPRACTOR

## 2024-09-05 PROCEDURE — 97110 THERAPEUTIC EXERCISES: CPT | Performed by: CHIROPRACTOR

## 2024-09-05 NOTE — LETTER
September 5, 2024     Patient: Carlos Irving  YOB: 1965  Date of Visit: 9/5/2024      To Whom it May Concern:    Carlos Irving is under my professional care. Carlos was seen in my office on 9/5/2024. Carlos was present at our office for treatment.     If you have any questions or concerns, please don't hesitate to call.         Sincerely,          Juanita Nugent DC        CC: No Recipients

## 2024-09-05 NOTE — PROGRESS NOTES
Diagnoses and all orders for this visit:    Segmental dysfunction of thoracic region    Segmental dysfunction of lumbar region    Neck pain    Segmental dysfunction of cervical region    Segmental dysfunction of sacral region    Mechanical low back pain      ASSESSMENT:  Pt's symptoms and exam findings consistent with mechanical neck pain secondary to repetitive st/sp injury of discogenic origin. Pt responded well to extension biased stretches and manual mobilization of the affected spinal and myofascial tissues with increased ROM; trial of conservative tx recommended consisting on Melony extension biased exercises, graded mobilization/manipulation of the affected tissues, postural/ergonomic education and take home stretches/exercises. If symptoms fail to centralize or neurologic deficit presents, appropriate imaging and referral will be coordinated.  -Pt has exacerbation of back pain without red flags. Tolerated treatment well with some improvements in pain and mm spasm  - The patient tolerated treatment fairly well but F/U in one week.    CODES: 54115 and 63768    TREATMENT:  Fear avoidance behavior discussion, encouraged and reassured pt that natural course of condition is to improve over time with adherence to tx plan and home care strategies. Home care recommendations: walk to tolerance, gradual return to activity to tolerance (avoid anything that peripheralizes symptoms), call if symptoms peripheralize, worsen, or neurologic deficit progresses. Postural education, avoid heavy lifting, and prolonged cervical flexion. Use cervical roll as recommended, Use of lumbar roll as recommended, Ther-ex: IASTM to affected mm hypertonicities (discussed soreness/ecchymosis up to 36 hrs post procedure); cervical axial retraction, Brueggers position, seated scapular retraction, greater than 15 spent on above mentioned ther-ex. Cervical mobilization/ long axis traction- manual with chin retraction, Thoracic  mobilization/manipulation: prone P-A mob, Prone A-P CT manip; Lumbar mobilization- Flexion distraction, Sacrum. L SIJ- Long axis distraction    HPI  Carlos Irving is a 59 y.o. male  Chief Complaint   Patient presents with   • Neck - Pain     Neck is sore on the right side 5-6     Left side 8-9        • Back Pain     Lower lumbar pain score 5         The patient presents to the office with neck and arm pain that has been an issue for several years. No specific trauma. That patient reports she has L shoulder surgery two years ago. The patient reports two weeks ago he has some pain in the L sided again without specific incident. The patient is an  and now works with [employer]. Less physical than before. Usually lifts weights but work on elastic bands and its helping.   1/22- The patient tolerated traetment well but some HA with last appt but subsided after a day and continued to work out.   2/5- The patient is feeling a little better since his last visit. Tight in the neck and lower back  4/30- The patient presents to the office with increase in pain into the base of the neck that feels sharp and a little into the L shoulder. The patient was exercises in his normal way and there was no trauma. No pain in to the UE.  5/14- The pt mentions his neck was improving but then a few days ago was trying to stretch and then pulled a muscle and then had a hard time turning.  5/30- Flare up of L sided pain and lateral hip pain after throwing around rocks at his camp site.   6/18- The patient is feeling some continued pain in L lower back and lateral hip since a few weeks ago, the pain is going up and down. Not taking any NSAIDs at this time for the pain.  7/11- The pt reports his neck and upper back tight but the pain has improved with continuing to work out.  8/1- The patient is feeling more sore in the neck and upper back after dealing with his R thumb injury last week which involved surgery.   8/22- The  patient has increase pain in the lower  back and into the skull. Very tight on both sides.  9/5- The patient is feeling sore with upper back and neck pain today.    Neck Pain   This is a chronic problem. The current episode started more than 1 year ago. The problem occurs 2 to 4 times per day. The problem has been waxing and waning. The pain is associated with lifting a heavy object. Associated symptoms include leg pain.   Shoulder Pain   The pain is at a severity of 10/10.   Back Pain  Associated symptoms include leg pain.   Leg Pain       Past Medical History:   Diagnosis Date   • Colon polyp    • Disease of thyroid gland    • GERD (gastroesophageal reflux disease)    • Hyperlipidemia    • Pulmonary embolism (HCC)     last assessed 1/10/2013   • Snoring 04/25/2016      Past Surgical History:   Procedure Laterality Date   • INCISION AND DRAINAGE OF WOUND Right 7/24/2024    Procedure: INCISION AND DRAINAGE (I&D) EXTREMITY, OPEN REDUCTION & PINNING;  Surgeon: Kwabena Reyes MD;  [facility];  Service: Orthopedics   • [place] SURGICAL ARTHROSCOPY SHOULDER W/ROTATOR CUFF RPR Left 7/29/2022    Procedure: SHOULDER ARTHROSCOPIC ROTATOR CUFF REPAIR BICEPS TENODISIS;  Surgeon: Chino Fragoso MD;  Location: [facility];  Service: Orthopedics   • TONSILLECTOMY AND ADENOIDECTOMY       The following portions of the patient's history were reviewed and updated as appropriate: allergies, past family history, past medical history, past social history, past surgical history, and problem list.  Review of Systems   Musculoskeletal:  Positive for back pain and neck pain.     Physical Exam  Musculoskeletal:         General: Tenderness present.      Cervical back: Rigidity, spasms, tenderness and crepitus present. No swelling, edema, deformity, erythema, signs of trauma, lacerations, torticollis or bony tenderness. Pain with movement present. Decreased range of motion.      Thoracic back: Spasms and tenderness present. No  swelling, edema, deformity, signs of trauma, lacerations or bony tenderness. Decreased range of motion. No scoliosis.      Lumbar back: Spasms and tenderness present. No swelling, edema, deformity, signs of trauma, lacerations or bony tenderness. Decreased range of motion. No scoliosis.        Back:    Neurological:      Gait: Gait is intact.      Deep Tendon Reflexes: Reflexes are normal and symmetric.       SOFT TISSUE ASSESSMENT: Hypertonicity and tenderness palpated B C4-T2 T10-S1, L SIJ Upper trap, levatory scap, L SCM, rhomboids, erector spinae, hip flexor, glute med/min JOINT RESTRICTIONS:C4-T2 T10-Ss. L SIJ ORTHO:Melony repeated flexion peripheralizes, extension centralizes;maximal foraminal compression-neg,cervical distraction- relieving symptoms; Spurlings negative for reproduction of radicular symptoms, SLR- Neg, Sherif- neg, FABERE- neg, Sitting ROOT- neg, SLUMP- Neg for neural tension    Return in about 1 week (around 9/12/2024) for Recheck.

## 2024-09-11 ENCOUNTER — OFFICE VISIT (OUTPATIENT)
Dept: OBGYN CLINIC | Facility: CLINIC | Age: 59
End: 2024-09-11

## 2024-09-11 VITALS
BODY MASS INDEX: 29.63 KG/M2 | HEIGHT: 70 IN | WEIGHT: 207 LBS | SYSTOLIC BLOOD PRESSURE: 120 MMHG | DIASTOLIC BLOOD PRESSURE: 60 MMHG

## 2024-09-11 DIAGNOSIS — Z47.89 AFTERCARE FOLLOWING SURGERY OF THE MUSCULOSKELETAL SYSTEM: Primary | ICD-10-CM

## 2024-09-11 PROCEDURE — 99024 POSTOP FOLLOW-UP VISIT: CPT | Performed by: SURGERY

## 2024-09-11 NOTE — LETTER
September 11, 2024     Patient: Carlos Irving  YOB: 1965  Date of Visit: 9/11/2024      To Whom it May Concern:    Carlos Irving is under my professional care. Carlos was seen in my office on 9/11/2024. Carlos can work with restrictions.  Restrictions include no lifting, pushing, or pulling greater than 15 lbs.  Restrictions in place until next visit in 2 weeks.    If you have any questions or concerns, please don't hesitate to call.         Sincerely,          Kwabena Reyes MD        CC: No Recipients

## 2024-09-11 NOTE — PROGRESS NOTES
Assessment/Plan:  Patient ID: Carlos Irving 59 y.o. male   Surgery: Incision And Drainage (i&d) Extremity, Open Reduction & Pinning - Right  Date of Surgery: 7/24/2024    Patient is doing well.  Recommend being more aggressive with passive and active range of motion of the thumb and hand.  Can do light grasping and lifting, normal daily activities, no heavy lifting just yet.  Work note provided.  Follow Up: 2 weeks      To Do Next Visit:  Start more aggressive motion         CHIEF COMPLAINT:  No chief complaint on file.        SUBJECTIVE:  Carlos Irving is a 59 y.o. year old male who presents for follow up after Incision And Drainage (i&d) Extremity, Open Reduction & Pinning - Right.  Patient reports overall he is improving.  He has been doing gentle range of motion and has been compliant with this.  The thumb is less swollen but is still sensitive mainly at the pin site.  Dorsal thumb feels tight with range of motion.  No associated numbness and tingling.  No significant pain.    PHYSICAL EXAMINATION:  General: well developed and well nourished, alert, oriented times 3, and appears comfortable  Psychiatric: Normal    MUSCULOSKELETAL EXAMINATION:  Incision: Healed, pin site healed.  Surgery Site: normal, no evidence of infection  and scabbing noted  Range of Motion: As expected slightly limited flexion extension, mild swelling  Neurovascular status: Neuro intact, good cap refill and radial pulse 2+  Drink testing deferred  Sensation intact to light touch  Good cap refill at the fingertip  Palpable radial pulse        STUDIES REVIEWED:  No new imaging performed for today's visit.      PROCEDURES PERFORMED:  Procedures  No Procedures performed today    Scribe Attestation      I,:  Florencio Manning PA-C am acting as a scribe while in the presence of the attending physician.:       I,:  Dr. Kwabena Reyes personally performed the services described in this documentation    as scribed in my presence.:

## 2024-09-26 ENCOUNTER — OFFICE VISIT (OUTPATIENT)
Dept: OBGYN CLINIC | Facility: CLINIC | Age: 59
End: 2024-09-26

## 2024-09-26 VITALS
DIASTOLIC BLOOD PRESSURE: 60 MMHG | BODY MASS INDEX: 29.63 KG/M2 | SYSTOLIC BLOOD PRESSURE: 116 MMHG | HEIGHT: 70 IN | WEIGHT: 207 LBS

## 2024-09-26 DIAGNOSIS — S63.104D DISLOCATION OF RIGHT THUMB, SUBSEQUENT ENCOUNTER: ICD-10-CM

## 2024-09-26 DIAGNOSIS — S62.501D: Primary | ICD-10-CM

## 2024-09-26 PROCEDURE — 99024 POSTOP FOLLOW-UP VISIT: CPT | Performed by: SURGERY

## 2024-09-26 NOTE — LETTER
September 26, 2024     Patient: Carlos Irving  YOB: 1965  Date of Visit: 9/26/2024      To Whom it May Concern:    Carlos Irving is under my professional care. Carlos was seen in my office on 9/26/2024. Carlos may return to work on 9/27/2024 with limitations: Carlos may slowly increase use of his Right  hand as he feels comfortable.    If you have any questions or concerns, please don't hesitate to call.         Sincerely,          Kwabena Reyes MD

## 2024-09-26 NOTE — PROGRESS NOTES
Assessment/Plan:  Patient ID: Carlos Irving 59 y.o. male   Surgery: Incision And Drainage (i&d) Extremity, Open Reduction & Pinning - Right Thumb  Date of Surgery: 7/24/2024    Patient is doing well.  Cont with ROM of IP joint and massage of scar tissue  Work note provided. May increase activities as tolerated. NSAID's as needed      To Do Next Visit:  Start more aggressive motion         CHIEF COMPLAINT:  Chief Complaint   Patient presents with    Right Hand - Post-op         SUBJECTIVE:  Carlos Irving is a 59 y.o. year old male who presents for follow up after Incision And Drainage (i&d) Extremity, Open Reduction & Pinning - Right.  Patient reports his IP joint is doing better with improved motion. Still has some senstivity  in his thumb but that is improving. He does report stiffness noted in his dorsal wrist with activities        PHYSICAL EXAMINATION:  General: well developed and well nourished, alert, oriented times 3, and appears comfortable  Psychiatric: Normal    MUSCULOSKELETAL EXAMINATION:  Incision: Healed, pin site healed.  Surgery Site: normal, no evidence of infection   Range of Motion: Limited due to stiffness and improved overall    Neurovascular status: Neuro intact, good cap refill and radial pulse 2+  Drink testing deferred  Sensation intact to light touch  Good cap refill at the fingertip  Palpable radial pulse        STUDIES REVIEWED:  No new imaging performed for today's visit.      PROCEDURES PERFORMED:  Procedures  No Procedures performed today    Scribe Attestation      I,:  Florencio Manning PA-C am acting as a scribe while in the presence of the attending physician.:       I,:  Dr. Kwabena Reyes personally performed the services described in this documentation    as scribed in my presence.:

## 2024-10-01 ENCOUNTER — TELEPHONE (OUTPATIENT)
Age: 59
End: 2024-10-01

## 2024-10-01 NOTE — TELEPHONE ENCOUNTER
Caller: [email or web address]    Doctor/Office: Eric     What needs to be faxed: Office note from 9/26/24    Fax#: [phone]       Documents were successfully e-faxed

## 2024-10-16 ENCOUNTER — TELEPHONE (OUTPATIENT)
Dept: OBGYN CLINIC | Facility: CLINIC | Age: 59
End: 2024-10-16

## 2024-10-17 ENCOUNTER — OFFICE VISIT (OUTPATIENT)
Dept: OBGYN CLINIC | Facility: MEDICAL CENTER | Age: 59
End: 2024-10-17

## 2024-10-17 VITALS
SYSTOLIC BLOOD PRESSURE: 120 MMHG | HEIGHT: 70 IN | DIASTOLIC BLOOD PRESSURE: 60 MMHG | BODY MASS INDEX: 29.63 KG/M2 | WEIGHT: 207 LBS

## 2024-10-17 DIAGNOSIS — S62.501D: Primary | ICD-10-CM

## 2024-10-17 PROCEDURE — 99024 POSTOP FOLLOW-UP VISIT: CPT | Performed by: SURGERY

## 2024-10-17 NOTE — LETTER
October 17, 2024     Patient: Carlos Irving  YOB: 1965  Date of Visit: 10/17/2024      To Whom it May Concern:    Carlos Irving is under my professional care. Carlos was seen in my office on 10/17/2024. Carlos may return to work on 10/18/24 without restrictions .    If you have any questions or concerns, please don't hesitate to call.         Sincerely,          Kwabena Reyes MD        CC: No Recipients

## 2024-10-17 NOTE — PROGRESS NOTES
HPI:  Pt is a 58 yo male s/p right thumb open distal phalanx fracture washout, pinning, extensor mechanism repair, germinal matrix repair on 8/27/24.  Pt states that he is doing well.  He notes some sensitivity on the dorsal and radial thumb.  ROM is improving.      PE:  RUE:  well healed, dorsal scar tissue, no erythema, good thumb IP flexion and extension, nail plate with some irregularity, SILT    A/P:  Pt is a 58 yo male s/p right thumb open distal phalanx fracture washout, pinning, extensor mechanism repair, germinal matrix repair on 8/27/24.  -Cont with activity as tolerated.  -Scar massage.  -Work letter for return without restrictions.  -NSAIDs as needed for any discomfort  -F/U PRN.

## 2024-10-27 DIAGNOSIS — E03.9 HYPOTHYROIDISM, UNSPECIFIED TYPE: ICD-10-CM

## 2024-10-28 ENCOUNTER — OFFICE VISIT (OUTPATIENT)
Dept: FAMILY MEDICINE CLINIC | Facility: CLINIC | Age: 59
End: 2024-10-28
Payer: COMMERCIAL

## 2024-10-28 VITALS
RESPIRATION RATE: 16 BRPM | DIASTOLIC BLOOD PRESSURE: 78 MMHG | SYSTOLIC BLOOD PRESSURE: 130 MMHG | HEART RATE: 65 BPM | WEIGHT: 204.5 LBS | OXYGEN SATURATION: 99 % | TEMPERATURE: 98.2 F | HEIGHT: 70 IN | BODY MASS INDEX: 29.28 KG/M2

## 2024-10-28 DIAGNOSIS — E78.2 MIXED HYPERLIPIDEMIA: ICD-10-CM

## 2024-10-28 DIAGNOSIS — R73.01 IFG (IMPAIRED FASTING GLUCOSE): ICD-10-CM

## 2024-10-28 DIAGNOSIS — E03.9 ACQUIRED HYPOTHYROIDISM: Primary | ICD-10-CM

## 2024-10-28 DIAGNOSIS — Z23 ENCOUNTER FOR IMMUNIZATION: ICD-10-CM

## 2024-10-28 PROBLEM — S20.361A TICK BITE OF RIGHT FRONT WALL OF THORAX: Status: RESOLVED | Noted: 2024-06-15 | Resolved: 2024-10-28
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
## 2024-10-28 PROCEDURE — 90673 RIV3 VACCINE NO PRESERV IM: CPT

## 2024-10-28 PROCEDURE — 99214 OFFICE O/P EST MOD 30 MIN: CPT | Performed by: FAMILY MEDICINE

## 2024-10-28 PROCEDURE — 90471 IMMUNIZATION ADMIN: CPT

## 2024-10-28 RX ORDER — LEVOTHYROXINE SODIUM 112 UG/1
TABLET ORAL
Qty: 90 TABLET | Refills: 1 | Status: SHIPPED | OUTPATIENT
Start: 2024-10-28

## 2024-10-28 NOTE — PROGRESS NOTES
"Ambulatory Visit  Name: Carlos Irving      : 1965      MRN: [id number]  Encounter Provider: Joy Reyes DO  Encounter Date: 10/28/2024   Encounter department: [facility]    Assessment & Plan  Acquired hypothyroidism  Check tsh  Orders:    TSH, 3rd generation with Free T4 reflex; Future    IFG (impaired fasting glucose)  Check a1c  Orders:    Hemoglobin A1C; Future    Mixed hyperlipidemia  Check lipid  Orders:    Comprehensive metabolic panel; Future    Lipid Panel with Direct LDL reflex; Future    Encounter for immunization    Orders:    influenza vaccine, recombinant, PF, 0.5 mL IM (Flublok)         History of Present Illness     History of Present Illness  Here for follow up  Had thumb injury from lili alexiser  Working out more       Review of Systems   Constitutional: Negative.    HENT: Negative.     Eyes: Negative.    Respiratory: Negative.     Cardiovascular: Negative.    Gastrointestinal: Negative.    Endocrine: Negative.    Genitourinary: Negative.    Musculoskeletal: Negative.    Allergic/Immunologic: Negative.    Neurological: Negative.    Hematological: Negative.    Psychiatric/Behavioral: Negative.       Objective     /78 (BP Location: Left arm, Patient Position: Sitting, Cuff Size: Large)   Pulse 65   Temp 98.2 °F (36.8 °C) (Tympanic)   Resp 16   Ht 5' 10\" (1.778 m)   Wt 92.8 kg (204 lb 8 oz)   SpO2 99%   BMI 29.34 kg/m²     Physical Exam    Physical Exam  Vitals and nursing note reviewed.   Constitutional:       Appearance: Normal appearance. He is well-developed.   HENT:      Head: Normocephalic and atraumatic.      Right Ear: External ear normal.      Left Ear: External ear normal.      Nose: Nose normal.   Eyes:      Extraocular Movements: Extraocular movements intact.      Conjunctiva/sclera: Conjunctivae normal.      Pupils: Pupils are equal, round, and reactive to light.   Cardiovascular:      Rate and Rhythm: Normal rate and regular rhythm.      Heart " sounds: Normal heart sounds.   Pulmonary:      Effort: Pulmonary effort is normal.      Breath sounds: Normal breath sounds.   Abdominal:      General: Abdomen is flat. Bowel sounds are normal.      Palpations: Abdomen is soft.   Musculoskeletal:         General: Normal range of motion.      Cervical back: Normal range of motion and neck supple.   Skin:     General: Skin is warm and dry.      Capillary Refill: Capillary refill takes less than 2 seconds.   Neurological:      General: No focal deficit present.      Mental Status: He is alert and oriented to person, place, and time.   Psychiatric:         Mood and Affect: Mood normal.         Behavior: Behavior normal.         Thought Content: Thought content normal.         Judgment: Judgment normal.

## 2024-11-17 DIAGNOSIS — F41.9 ANXIETY: ICD-10-CM

## 2024-11-18 RX ORDER — ALPRAZOLAM 0.25 MG/1
TABLET ORAL
Qty: 45 TABLET | Refills: 3 | Status: SHIPPED | OUTPATIENT
Start: 2024-11-18

## 2024-11-18 NOTE — TELEPHONE ENCOUNTER
Medication:  PDMP     10/02/2024 05/30/2024 ALPRAZolam (Tablet) 45.0 30 0.25 MG NA LUIS MANUEL URBINA     Active agreement on file -No

## 2024-12-08 DIAGNOSIS — E78.2 MIXED HYPERLIPIDEMIA: ICD-10-CM

## 2024-12-09 DIAGNOSIS — R12 HEARTBURN: ICD-10-CM

## 2024-12-09 NOTE — TELEPHONE ENCOUNTER
Message sent via [employer].   The option to request refill for Omeprazole was locked and directed me to send a message. Will you send refill to [facility] in [place]?

## 2024-12-10 RX ORDER — ROSUVASTATIN CALCIUM 20 MG/1
20 TABLET, COATED ORAL DAILY
Qty: 90 TABLET | Refills: 1 | Status: SHIPPED | OUTPATIENT
Start: 2024-12-10

## 2024-12-10 RX ORDER — OMEPRAZOLE 40 MG/1
40 CAPSULE, DELAYED RELEASE ORAL DAILY
Qty: 90 CAPSULE | Refills: 1 | Status: SHIPPED | OUTPATIENT
Start: 2024-12-10

## 2025-01-07 ENCOUNTER — PATIENT MESSAGE (OUTPATIENT)
Dept: FAMILY MEDICINE CLINIC | Facility: CLINIC | Age: 60
End: 2025-01-07

## 2025-01-07 ENCOUNTER — TELEPHONE (OUTPATIENT)
Age: 60
End: 2025-01-07

## 2025-01-07 NOTE — TELEPHONE ENCOUNTER
Are you willing to see patient virtual or given medication or do you want the patient to come in for a visit? Please advise.

## 2025-01-07 NOTE — TELEPHONE ENCOUNTER
Pt stated he has right hip pain started 3 days ago. Pt requested a virtual appt or requested meds to be sent to pharmacy for pain.       Please contact pt and let him know what PCP decided. Thank you.

## 2025-01-08 ENCOUNTER — OFFICE VISIT (OUTPATIENT)
Dept: FAMILY MEDICINE CLINIC | Facility: CLINIC | Age: 60
End: 2025-01-08
Payer: COMMERCIAL

## 2025-01-08 VITALS
OXYGEN SATURATION: 97 % | TEMPERATURE: 96.8 F | WEIGHT: 212 LBS | SYSTOLIC BLOOD PRESSURE: 136 MMHG | HEART RATE: 60 BPM | RESPIRATION RATE: 18 BRPM | DIASTOLIC BLOOD PRESSURE: 80 MMHG | BODY MASS INDEX: 30.35 KG/M2 | HEIGHT: 70 IN

## 2025-01-08 DIAGNOSIS — M54.41 ACUTE RIGHT-SIDED LOW BACK PAIN WITH RIGHT-SIDED SCIATICA: Primary | ICD-10-CM

## 2025-01-08 PROCEDURE — 99214 OFFICE O/P EST MOD 30 MIN: CPT | Performed by: NURSE PRACTITIONER

## 2025-01-08 RX ORDER — METHYLPREDNISOLONE 4 MG/1
TABLET ORAL
Qty: 21 EACH | Refills: 0 | Status: SHIPPED | OUTPATIENT
Start: 2025-01-08

## 2025-01-08 NOTE — PROGRESS NOTES
FAMILY PRACTICE OFFICE VISIT       NAME: Carlos Irving  AGE: 59 y.o. SEX: male       : 1965        MRN: [id number]    DATE: 2025  TIME: 3:34 PM    Assessment and Plan   1. Acute right-sided low back pain with right-sided sciatica  -     methylPREDNISolone 4 MG tablet therapy pack; Use as directed on package  -     tiZANidine (ZANAFLEX) 4 mg tablet; Take 1 tablet (4 mg total) by mouth every 8 (eight) hours as needed for muscle spasms       There are no Patient Instructions on file for this visit.        Chief Complaint     Chief Complaint   Patient presents with    Hip Pain     Pt being seen for hip/back pain       History of Present Illness   Carlos Irving is a 59 y.o.-year-old male who is here for c/o low back pain and sciatica right side  Tried stretching with his exercises for sciatica  Thinks he stretched too far  Hip pain and buttocks pain on right side  Tried sleeping on recliner  Today went to chiropractor for adjustment  Using ice now  Will go back to chiropractor      Review of Systems   Review of Systems   Constitutional:  Negative for fatigue, fever and unexpected weight change.   Respiratory:  Negative for cough, shortness of breath and wheezing.    Cardiovascular:  Negative for chest pain and palpitations.   Gastrointestinal:  Negative for abdominal pain, constipation, diarrhea, nausea and vomiting.   Genitourinary:  Negative for dysuria and frequency.   Musculoskeletal:  Positive for back pain.   Skin:  Negative for rash.   Neurological:  Negative for dizziness, weakness, light-headedness, numbness and headaches.   Hematological:  Negative for adenopathy.   Psychiatric/Behavioral:  Negative for dysphoric mood and sleep disturbance. The patient is not nervous/anxious.      BMI Counseling: Body mass index is 30.42 kg/m². The BMI is above normal. Nutrition recommendations include reducing portion sizes, decreasing overall calorie intake, 3-5 servings of fruits/vegetables daily, reducing  fast food intake, consuming healthier snacks, decreasing soda and/or juice intake, moderation in carbohydrate intake, increasing intake of lean protein, reducing intake of saturated fat and trans fat, and reducing intake of cholesterol. Exercise recommendations include moderate aerobic physical activity for 150 minutes/week, exercising 3-5 times per week, and strength training exercises.   Active Problem List     Patient Active Problem List   Diagnosis    Anxiety disorder    GERD (gastroesophageal reflux disease)    Hyperlipidemia    Hypothyroidism    Insomnia    Seborrheic keratosis    Umbilical hernia    Muscle spasm    Impingement syndrome of left shoulder    Radiculopathy, cervical region    Internal derangement of shoulder, left    Tear of left supraspinatus tendon    Rectal bleeding    Annual physical exam    IFG (impaired fasting glucose)    Elevated liver enzymes    Aftercare following surgery of the musculoskeletal system    Acute right-sided low back pain with right-sided sciatica         Past Medical History:  Past Medical History:   Diagnosis Date    Colon polyp     Disease of thyroid gland     GERD (gastroesophageal reflux disease)     Hyperlipidemia     Pulmonary embolism (HCC)     last assessed 1/10/2013    Snoring 04/25/2016       Past Surgical History:  Past Surgical History:   Procedure Laterality Date    INCISION AND DRAINAGE OF WOUND Right 7/24/2024    Procedure: INCISION AND DRAINAGE (I&D) EXTREMITY, OPEN REDUCTION & PINNING;  Surgeon: Kwabena Reyes MD;  Location: [facility];  Service: Orthopedics    IN SURGICAL ARTHROSCOPY SHOULDER W/ROTATOR CUFF RPR Left 7/29/2022    Procedure: SHOULDER ARTHROSCOPIC ROTATOR CUFF REPAIR BICEPS TENODISIS;  Surgeon: Chino Fragoso MD;  Location: [facility];  Service: Orthopedics    TONSILLECTOMY AND ADENOIDECTOMY         Family History:  Family History   Problem Relation Age of Onset    Diabetes Mother         DM    Hypothyroidism Mother     Glaucoma  Mother     Hypertension Mother     Alcohol abuse Sister     Drug abuse Sister     Hypothyroidism Sister     Lung cancer Maternal Grandmother        Social History:  Social History     Socioeconomic History    Marital status: /Civil Union     Spouse name: Not on file    Number of children: 3    Years of education: Not on file    Highest education level: Not on file   Occupational History    Occupation:      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   Tobacco Use    Smoking status: Former     Current packs/day: 0.00     Types: Cigarettes     Quit date:      Years since quittin.0     Passive exposure: Past    Smokeless tobacco: Never   Vaping Use    Vaping status: Former    Substances: CBD, Flavoring   Substance and Sexual Activity    Alcohol use: Yes     Comment: social    Drug use: No    Sexual activity: Yes     Partners: Female   Other Topics Concern    Not on file   Social History Narrative    Recreational activities martial arts      Social Drivers of Health     Financial Resource Strain: Not on file   Food Insecurity: Not on file   Transportation Needs: Not on file   Physical Activity: Not on file   Stress: Not on file   Social Connections: Not on file   Intimate Partner Violence: Not on file   Housing Stability: Not on file       Objective     Vitals:    25 1737   BP: 136/80   Pulse: 60   Resp: 18   Temp: (!) 96.8 °F (36 °C)   SpO2: 97%     Wt Readings from Last 3 Encounters:   25 96.2 kg (212 lb)   10/28/24 92.8 kg (204 lb 8 oz)   10/17/24 93.9 kg (207 lb)       Physical Exam  Vitals and nursing note reviewed.   Constitutional:       Appearance: Normal appearance.   HENT:      Head: Normocephalic and atraumatic.   Eyes:      Conjunctiva/sclera: Conjunctivae normal.   Cardiovascular:      Rate and Rhythm: Normal rate and regular rhythm.      Heart sounds: Normal heart sounds.   Pulmonary:      Effort: Pulmonary effort is normal.      Breath sounds: Normal  "breath sounds.   Abdominal:      General: Bowel sounds are normal.   Musculoskeletal:         General: Normal range of motion.      Cervical back: Normal range of motion and neck supple.      Comments: Increase pain with forward and backward flexion     Skin:     General: Skin is warm and dry.      Capillary Refill: Capillary refill takes less than 2 seconds.   Neurological:      Mental Status: He is alert and oriented to person, place, and time.   Psychiatric:         Mood and Affect: Mood normal.         Behavior: Behavior normal.         Pertinent Laboratory/Diagnostic Studies:  Lab Results   Component Value Date    GLUCOSE 112 04/13/2015    BUN 24 07/24/2024    CREATININE 0.83 07/24/2024    CALCIUM 9.0 07/24/2024     04/13/2015    K 4.0 07/24/2024    CO2 26 07/24/2024     07/24/2024     Lab Results   Component Value Date    ALT 23 07/24/2024    AST 20 07/24/2024    ALKPHOS 53 07/24/2024    BILITOT 0.45 04/13/2015       Lab Results   Component Value Date    WBC 5.06 07/24/2024    HGB 14.5 07/24/2024    HCT 41.7 07/24/2024    MCV 90 07/24/2024     07/24/2024       No results found for: \"TSH\"    Lab Results   Component Value Date    CHOL 180 04/13/2015     Lab Results   Component Value Date    TRIG 98 02/29/2024     Lab Results   Component Value Date    HDL 32 (L) 02/29/2024     Lab Results   Component Value Date    LDLCALC 53 02/29/2024     Lab Results   Component Value Date    HGBA1C 5.9 (H) 07/11/2024       Results for orders placed or performed during the hospital encounter of 07/24/24   Comprehensive metabolic panel    Collection Time: 07/24/24  8:30 AM   Result Value Ref Range    Sodium 138 135 - 147 mmol/L    Potassium 4.0 3.5 - 5.3 mmol/L    Chloride 107 96 - 108 mmol/L    CO2 26 21 - 32 mmol/L    ANION GAP 5 4 - 13 mmol/L    BUN 24 5 - 25 mg/dL    Creatinine 0.83 0.60 - 1.30 mg/dL    Glucose 109 65 - 140 mg/dL    Calcium 9.0 8.4 - 10.2 mg/dL    AST 20 13 - 39 U/L    ALT 23 7 - 52 U/L    " Alkaline Phosphatase 53 34 - 104 U/L    Total Protein 6.9 6.4 - 8.4 g/dL    Albumin 4.1 3.5 - 5.0 g/dL    Total Bilirubin 0.48 0.20 - 1.00 mg/dL    eGFR 95 ml/min/1.73sq m   CBC and differential    Collection Time: 07/24/24  8:30 AM   Result Value Ref Range    WBC 5.06 4.31 - 10.16 Thousand/uL    RBC 4.63 3.88 - 5.62 Million/uL    Hemoglobin 14.5 12.0 - 17.0 g/dL    Hematocrit 41.7 36.5 - 49.3 %    MCV 90 82 - 98 fL    MCH 31.3 26.8 - 34.3 pg    MCHC 34.8 31.4 - 37.4 g/dL    RDW 13.2 11.6 - 15.1 %    MPV 8.2 (L) 8.9 - 12.7 fL    Platelets 244 149 - 390 Thousands/uL    nRBC 0 /100 WBCs    Segmented % 57 43 - 75 %    Immature Grans % 0 0 - 2 %    Lymphocytes % 28 14 - 44 %    Monocytes % 12 4 - 12 %    Eosinophils Relative 2 0 - 6 %    Basophils Relative 1 0 - 1 %    Absolute Neutrophils 2.91 1.85 - 7.62 Thousands/µL    Absolute Immature Grans 0.02 0.00 - 0.20 Thousand/uL    Absolute Lymphocytes 1.42 0.60 - 4.47 Thousands/µL    Absolute Monocytes 0.60 0.17 - 1.22 Thousand/µL    Eosinophils Absolute 0.08 0.00 - 0.61 Thousand/µL    Basophils Absolute 0.03 0.00 - 0.10 Thousands/µL       No orders of the defined types were placed in this encounter.      ALLERGIES:  Allergies   Allergen Reactions    Esomeprazole        Current Medications     Current Outpatient Medications   Medication Sig Dispense Refill    ALPRAZolam (XANAX) 0.25 mg tablet TAKE 1+1/2 TABLETS BY MOUTH AT BEDTIME 45 tablet 3    aspirin (ECOTRIN LOW STRENGTH) 81 mg EC tablet Take 81 mg by mouth daily      Co Q-10 50 MG Take 50 mg by mouth      levothyroxine 112 mcg tablet Take 1 tablet (112 mcg total) by mouth daily in the early morning 90 tablet 1    methylPREDNISolone 4 MG tablet therapy pack Use as directed on package 21 each 0    Multiple Vitamins-Minerals (CENTRUM SILVER 50+MEN PO) Take 1 tablet by mouth daily      omeprazole (PriLOSEC) 40 MG capsule Take 1 capsule (40 mg total) by mouth daily 90 capsule 1    rosuvastatin (CRESTOR) 20 MG tablet Take 1  tablet (20 mg total) by mouth daily 90 tablet 1    tiZANidine (ZANAFLEX) 4 mg tablet Take 1 tablet (4 mg total) by mouth every 8 (eight) hours as needed for muscle spasms 30 tablet 0     No current facility-administered medications for this visit.         Health Maintenance     Health Maintenance   Topic Date Due    Zoster Vaccine (1 of 2) Never done    COVID-19 Vaccine (4 - 2024-25 season) 09/01/2024    Annual Physical  04/29/2025    Depression Screening  01/08/2026    Colorectal Cancer Screening  03/04/2029    DTaP,Tdap,and Td Vaccines (5 - Td or Tdap) 07/24/2034    RSV Vaccine for Pregnant Patients and Patients Age 60+ Years (1 - 1-dose 75+ series) 02/24/2039    HIV Screening  Completed    Hepatitis C Screening  Completed    Influenza Vaccine  Completed    Meningococcal B Vaccine  Aged Out    RSV Vaccine age 0-20 Months  Aged Out    Pneumococcal Vaccine: Pediatrics (0 to 5 Years) and At-Risk Patients (6 to 64 Years)  Aged Out    HIB Vaccine  Aged Out    IPV Vaccine  Aged Out    Hepatitis A Vaccine  Aged Out    Meningococcal ACWY Vaccine  Aged Out    HPV Vaccine  Aged Out     Immunization History   Administered Date(s) Administered    COVID-19 PFIZER VACCINE 0.3 ML IM 03/15/2021, 04/05/2021, 11/13/2021    INFLUENZA 10/31/2014, 01/23/2017, 12/04/2017, 12/01/2022    Influenza Quadrivalent Preservative Free 3 years and older IM 10/30/2015, 01/23/2017, 12/04/2017    Influenza Recombinant Preservative Free Im 10/28/2024    Influenza, injectable, quadrivalent, preservative free 0.5 mL 10/06/2018, 10/24/2023    Influenza, recombinant, quadrivalent,injectable, preservative free 10/19/2019, 10/21/2020, 10/16/2021    Influenza, seasonal, injectable 12/12/2013    Tdap 09/27/2010, 03/12/2014, 04/29/2024, 07/24/2024       Depression Screening and Follow-up Plan: Patient was screened for depression during today's encounter. They screened negative with a PHQ-2 score of 0.        MISSY Mehta

## 2025-04-28 ENCOUNTER — APPOINTMENT (OUTPATIENT)
Dept: LAB | Age: 60
End: 2025-04-28
Attending: FAMILY MEDICINE

## 2025-04-28 ENCOUNTER — APPOINTMENT (OUTPATIENT)
Dept: LAB | Age: 60
End: 2025-04-28
Attending: PREVENTIVE MEDICINE

## 2025-04-28 DIAGNOSIS — Z00.8 HEALTH EXAMINATION IN POPULATION SURVEY: ICD-10-CM

## 2025-04-28 DIAGNOSIS — E03.9 ACQUIRED HYPOTHYROIDISM: ICD-10-CM

## 2025-04-28 DIAGNOSIS — E78.2 MIXED HYPERLIPIDEMIA: ICD-10-CM

## 2025-04-28 DIAGNOSIS — R73.01 IFG (IMPAIRED FASTING GLUCOSE): ICD-10-CM

## 2025-04-28 LAB
ALBUMIN SERPL BCG-MCNC: 3.9 G/DL (ref 3.5–5)
ALP SERPL-CCNC: 56 U/L (ref 34–104)
ALT SERPL W P-5'-P-CCNC: 19 U/L (ref 7–52)
ANION GAP SERPL CALCULATED.3IONS-SCNC: 6 MMOL/L (ref 4–13)
AST SERPL W P-5'-P-CCNC: 21 U/L (ref 13–39)
BILIRUB SERPL-MCNC: 0.6 MG/DL (ref 0.2–1)
BUN SERPL-MCNC: 18 MG/DL (ref 5–25)
CALCIUM SERPL-MCNC: 8.8 MG/DL (ref 8.4–10.2)
CHLORIDE SERPL-SCNC: 106 MMOL/L (ref 96–108)
CHOLEST SERPL-MCNC: 99 MG/DL (ref ?–200)
CO2 SERPL-SCNC: 28 MMOL/L (ref 21–32)
CREAT SERPL-MCNC: 0.81 MG/DL (ref 0.6–1.3)
EST. AVERAGE GLUCOSE BLD GHB EST-MCNC: 120 MG/DL
GFR SERPL CREATININE-BSD FRML MDRD: 95 ML/MIN/1.73SQ M
GLUCOSE P FAST SERPL-MCNC: 120 MG/DL (ref 65–99)
HBA1C MFR BLD: 5.8 %
HDLC SERPL-MCNC: 32 MG/DL
LDLC SERPL CALC-MCNC: 46 MG/DL (ref 0–100)
POTASSIUM SERPL-SCNC: 4.1 MMOL/L (ref 3.5–5.3)
PROT SERPL-MCNC: 6.7 G/DL (ref 6.4–8.4)
SODIUM SERPL-SCNC: 140 MMOL/L (ref 135–147)
TRIGL SERPL-MCNC: 106 MG/DL (ref ?–150)
TSH SERPL DL<=0.05 MIU/L-ACNC: 1.23 UIU/ML (ref 0.45–4.5)

## 2025-04-28 PROCEDURE — 80053 COMPREHEN METABOLIC PANEL: CPT

## 2025-04-28 PROCEDURE — 80061 LIPID PANEL: CPT

## 2025-04-28 PROCEDURE — 83036 HEMOGLOBIN GLYCOSYLATED A1C: CPT

## 2025-04-28 PROCEDURE — 84443 ASSAY THYROID STIM HORMONE: CPT

## 2025-04-28 PROCEDURE — 36415 COLL VENOUS BLD VENIPUNCTURE: CPT

## 2025-04-29 ENCOUNTER — RESULTS FOLLOW-UP (OUTPATIENT)
Dept: FAMILY MEDICINE CLINIC | Facility: CLINIC | Age: 60
End: 2025-04-29

## 2025-05-01 ENCOUNTER — OFFICE VISIT (OUTPATIENT)
Dept: FAMILY MEDICINE CLINIC | Facility: CLINIC | Age: 60
End: 2025-05-01
Payer: COMMERCIAL

## 2025-05-01 VITALS
HEART RATE: 79 BPM | BODY MASS INDEX: 30.64 KG/M2 | HEIGHT: 70 IN | WEIGHT: 214 LBS | SYSTOLIC BLOOD PRESSURE: 120 MMHG | OXYGEN SATURATION: 97 % | DIASTOLIC BLOOD PRESSURE: 72 MMHG | TEMPERATURE: 98.7 F | RESPIRATION RATE: 16 BRPM

## 2025-05-01 DIAGNOSIS — R73.01 IFG (IMPAIRED FASTING GLUCOSE): ICD-10-CM

## 2025-05-01 DIAGNOSIS — Z12.5 SCREENING FOR PROSTATE CANCER: ICD-10-CM

## 2025-05-01 DIAGNOSIS — R21 RASH: ICD-10-CM

## 2025-05-01 DIAGNOSIS — Z00.00 ANNUAL PHYSICAL EXAM: Primary | ICD-10-CM

## 2025-05-01 DIAGNOSIS — E78.2 MIXED HYPERLIPIDEMIA: ICD-10-CM

## 2025-05-01 PROCEDURE — 99396 PREV VISIT EST AGE 40-64: CPT | Performed by: FAMILY MEDICINE

## 2025-05-01 RX ORDER — TRIAMCINOLONE ACETONIDE 1 MG/G
CREAM TOPICAL 2 TIMES DAILY
Qty: 80 G | Refills: 0 | Status: SHIPPED | OUTPATIENT
Start: 2025-05-01

## 2025-05-01 NOTE — PROGRESS NOTES
Adult Annual Physical  Name: Carlos Irving      : 1965      MRN: [id number]  Encounter Provider: Joy Reyes DO  Encounter Date: 2025   Encounter department: [facility]    :  Assessment & Plan  Annual physical exam         Mixed hyperlipidemia    Orders:  •  CBC; Future  •  Comprehensive metabolic panel; Future  •  Lipid Panel with Direct LDL reflex; Future  •  TSH, 3rd generation with Free T4 reflex; Future    IFG (impaired fasting glucose)    Orders:  •  Hemoglobin A1C; Future    Screening for prostate cancer    Orders:  •  PSA, Total Screen; Future    Rash    Orders:  •  triamcinolone (KENALOG) 0.1 % cream; Apply topically 2 (two) times a day        Preventive Screenings:  - Diabetes Screening: screening up-to-date  - Cholesterol Screening: screening not indicated and has hyperlipidemia   - Hepatitis C screening: screening up-to-date   - HIV screening: screening up-to-date   - Colon cancer screening: screening up-to-date   - Lung cancer screening: screening not indicated   - Prostate cancer screening: screening up-to-date     Immunizations:  - Immunizations due: Zoster (Shingrix)         History of Present Illness     Adult Annual Physical:  Patient presents for annual physical. Here for wellness  Overall doing well.     Diet and Physical Activity:  - Diet/Nutrition: well balanced diet.  - Exercise: strength training exercises, 3-4 times a week on average and moderate cardiovascular exercise.    General Health:  - Sleep: sleeps well and 4-6 hours of sleep on average.  - Hearing: normal hearing right ear and normal hearing left ear.  - Vision: wears glasses.  - Dental: regular dental visits.     Health:  - History of STDs: no.   - Urinary symptoms: none.     Advanced Care Planning:  - Has an advanced directive?: no    - Has a durable medical POA?: no      Review of Systems   Constitutional: Negative.    HENT: Negative.     Eyes: Negative.    Respiratory: Negative.    "  Cardiovascular: Negative.    Gastrointestinal: Negative.    Endocrine: Negative.    Genitourinary: Negative.    Musculoskeletal: Negative.    Skin: Negative.    Allergic/Immunologic: Negative.    Neurological: Negative.    Hematological: Negative.    Psychiatric/Behavioral: Negative.       Medical History Reviewed by provider this encounter:  Tobacco  Allergies  Meds  Problems  Med Hx  Surg Hx  Fam Hx     .    Objective   /72 (BP Location: Left arm, Patient Position: Sitting, Cuff Size: Large)   Pulse 79   Temp 98.7 °F (37.1 °C) (Tympanic)   Resp 16   Ht 5' 9.65\" (1.769 m)   Wt 97.1 kg (214 lb)   SpO2 97%   BMI 31.02 kg/m²     Physical Exam  Vitals and nursing note reviewed.   Constitutional:       Appearance: He is well-developed.   HENT:      Head: Normocephalic and atraumatic.      Right Ear: External ear normal.      Left Ear: External ear normal.      Nose: Nose normal.   Eyes:      Conjunctiva/sclera: Conjunctivae normal.      Pupils: Pupils are equal, round, and reactive to light.   Cardiovascular:      Rate and Rhythm: Normal rate and regular rhythm.      Heart sounds: Normal heart sounds.   Pulmonary:      Effort: Pulmonary effort is normal.      Breath sounds: Normal breath sounds.   Abdominal:      General: Bowel sounds are normal.      Palpations: Abdomen is soft.   Musculoskeletal:         General: Normal range of motion.      Cervical back: Normal range of motion and neck supple.   Skin:     General: Skin is warm and dry.      Capillary Refill: Capillary refill takes less than 2 seconds.   Neurological:      Mental Status: He is alert and oriented to person, place, and time.   Psychiatric:         Behavior: Behavior normal.         Thought Content: Thought content normal.         Judgment: Judgment normal.         "

## 2025-05-01 NOTE — ASSESSMENT & PLAN NOTE
Orders:  •  CBC; Future  •  Comprehensive metabolic panel; Future  •  Lipid Panel with Direct LDL reflex; Future  •  TSH, 3rd generation with Free T4 reflex; Future

## 2025-05-01 NOTE — PATIENT INSTRUCTIONS
"Patient Education     Routine physical for adults   The Basics   Written by the doctors and editors at [facility], [place]   What is a physical? -- A physical is a routine visit, or \"check-up,\" with your doctor. You might also hear it called a \"wellness visit\" or \"preventive visit.\"  During each visit, the doctor will:   Ask about your physical and mental health   Ask about your habits, behaviors, and lifestyle   Do an exam   Give you vaccines if needed   Talk to you about any medicines you take   Give advice about your health   Answer your questions  Getting regular check-ups is an important part of taking care of your health. It can help your doctor find and treat any problems you have. But it's also important for preventing health problems.  A routine physical is different from a \"sick visit.\" A sick visit is when you see a doctor because of a health concern or problem. Since physicals are scheduled ahead of time, you can think about what you want to ask the doctor.  How often should I get a physical? -- It depends on your age and health. In general, for people age 21 years and older:   If you are younger than 50 years, you might be able to get a physical every 3 years.   If you are 50 years or older, your doctor might recommend a physical every year.  If you have an ongoing health condition, like diabetes or high blood pressure, your doctor will probably want to see you more often.  What happens during a physical? -- In general, each visit will include:   Physical exam - The doctor or nurse will check your height, weight, heart rate, and blood pressure. They will also look at your eyes and ears. They will ask about how you are feeling and whether you have any symptoms that bother you.   Medicines - It's a good idea to bring a list of all the medicines you take to each doctor visit. Your doctor will talk to you about your medicines and answer any questions. Tell them if you are having any side effects that bother you. You " "should also tell them if you are having trouble paying for any of your medicines.   Habits and behaviors - This includes:   Your diet   Your exercise habits   Whether you smoke, drink alcohol, or use drugs   Whether you are sexually active   Whether you feel safe at home  Your doctor will talk to you about things you can do to improve your health and lower your risk of health problems. They will also offer help and support. For example, if you want to quit smoking, they can give you advice and might prescribe medicines. If you want to improve your diet or get more physical activity, they can help you with this, too.   Lab tests, if needed - The tests you get will depend on your age and situation. For example, your doctor might want to check your:   Cholesterol   Blood sugar   Iron level   Vaccines - The recommended vaccines will depend on your age, health, and what vaccines you already had. Vaccines are very important because they can prevent certain serious or deadly infections.   Discussion of screening - \"Screening\" means checking for diseases or other health problems before they cause symptoms. Your doctor can recommend screening based on your age, risk, and preferences. This might include tests to check for:   Cancer, such as breast, prostate, cervical, ovarian, colorectal, prostate, lung, or skin cancer   Sexually transmitted infections, such as chlamydia and gonorrhea   Mental health conditions like depression and anxiety  Your doctor will talk to you about the different types of screening tests. They can help you decide which screenings to have. They can also explain what the results might mean.   Answering questions - The physical is a good time to ask the doctor or nurse questions about your health. If needed, they can refer you to other doctors or specialists, too.  Adults older than 65 years often need other care, too. As you get older, your doctor will talk to you about:   How to prevent falling at " home   Hearing or vision tests   Memory testing   How to take your medicines safely   Making sure that you have the help and support you need at home  All topics are updated as new evidence becomes available and our peer review process is complete.  This topic retrieved from [employer] on: May 02, 2024.  Topic 815792 Version 1.0  Release: 32.4.3 - C32.122  © 2024 [employer] and/or its affiliates. All rights reserved.  Consumer Information Use and Disclaimer   Disclaimer: This generalized information is a limited summary of diagnosis, treatment, and/or medication information. It is not meant to be comprehensive and should be used as a tool to help the user understand and/or assess potential diagnostic and treatment options. It does NOT include all information about conditions, treatments, medications, side effects, or risks that may apply to a specific patient. It is not intended to be medical advice or a substitute for the medical advice, diagnosis, or treatment of a health care provider based on the health care provider's examination and assessment of a patient's specific and unique circumstances. Patients must speak with a health care provider for complete information about their health, medical questions, and treatment options, including any risks or benefits regarding use of medications. This information does not endorse any treatments or medications as safe, effective, or approved for treating a specific patient. [employer] and its affiliates disclaim any warranty or liability relating to this information or the use thereof.The use of this information is governed by the Terms of Use, available at https://www.woltersZanguwer.com/en/know/clinical-effectiveness-terms. 2024© [employer] Inc. and its affiliates and/or licensors. All rights reserved.  Copyright   © 2024 [employer] and/or its affiliates. All rights reserved.

## 2025-05-18 DIAGNOSIS — E03.9 HYPOTHYROIDISM, UNSPECIFIED TYPE: ICD-10-CM

## 2025-05-18 RX ORDER — LEVOTHYROXINE SODIUM 112 UG/1
TABLET ORAL
Qty: 90 TABLET | Refills: 1 | Status: SHIPPED | OUTPATIENT
Start: 2025-05-18

## 2025-05-19 RX ORDER — LEVOTHYROXINE SODIUM 112 UG/1
TABLET ORAL
Qty: 90 TABLET | Refills: 0 | OUTPATIENT
Start: 2025-05-19

## 2025-06-07 DIAGNOSIS — F41.9 ANXIETY: ICD-10-CM

## 2025-06-07 DIAGNOSIS — R12 HEARTBURN: ICD-10-CM

## 2025-06-07 DIAGNOSIS — E78.2 MIXED HYPERLIPIDEMIA: ICD-10-CM

## 2025-06-08 RX ORDER — OMEPRAZOLE 40 MG/1
40 CAPSULE, DELAYED RELEASE ORAL DAILY
Qty: 90 CAPSULE | Refills: 1 | Status: SHIPPED | OUTPATIENT
Start: 2025-06-08

## 2025-06-08 RX ORDER — ROSUVASTATIN CALCIUM 20 MG/1
20 TABLET, COATED ORAL DAILY
Qty: 90 TABLET | Refills: 1 | Status: SHIPPED | OUTPATIENT
Start: 2025-06-08

## 2025-06-09 RX ORDER — ALPRAZOLAM 0.25 MG
TABLET ORAL
Qty: 45 TABLET | Refills: 5 | Status: SHIPPED | OUTPATIENT
Start: 2025-06-09

## 2025-06-09 NOTE — TELEPHONE ENCOUNTER
Medication:  PDMP     05/12/2025 11/18/2024 ALPRAZolam (Tablet) 45.0 30 0.25 MG NA LUIS MANUEL URBINA     Active agreement on file -No

## (undated) DEVICE — SUT MONOCRYL 4-0 PS-2 18 IN Y496G

## (undated) DEVICE — BLADE SHAVER DISSECTOR 3.5MM 13CM COOLCUT

## (undated) DEVICE — DRESSING MEPILEX AG BORDER 4 X 4 IN

## (undated) DEVICE — CULTURE TUBE ANAEROBIC

## (undated) DEVICE — STERILE BETHLEHEM PLASTIC HAND: Brand: CARDINAL HEALTH

## (undated) DEVICE — GLOVE SRG BIOGEL 7.5

## (undated) DEVICE — ACE WRAP 4 IN STERILE

## (undated) DEVICE — OCCLUSIVE GAUZE STRIP,3% BISMUTH TRIBROMOPHENATE IN PETROLATUM BLEND: Brand: XEROFORM

## (undated) DEVICE — THREADED CLEAR CANNULA WITH OBTURATOR 7MM X 75MM

## (undated) DEVICE — GLOVE INDICATOR PI UNDERGLOVE SZ 8 BLUE

## (undated) DEVICE — DECANTER: Brand: UNBRANDED

## (undated) DEVICE — GLOVE SRG BIOGEL ECLIPSE 7

## (undated) DEVICE — BLADE SHAVER DISSECTOR 4MM 13CM COOLCUT

## (undated) DEVICE — THREADED CLEAR CANNULA WITH OBTURATOR 8.5MM X 75MM

## (undated) DEVICE — INTENDED FOR TISSUE SEPARATION, AND OTHER PROCEDURES THAT REQUIRE A SHARP SURGICAL BLADE TO PUNCTURE OR CUT.: Brand: BARD-PARKER ® CARBON RIB-BACK BLADES

## (undated) DEVICE — EXPRESSEW III SUTURE NEEDLE FOR USE WITH EXPRESSEW II OR III SUTURE PASSER: Brand: EXPRESSEW

## (undated) DEVICE — SHOULDER SUSPENSION KIT 6 PER BOX

## (undated) DEVICE — VAPR COOLPULSE 90 ELECTRODE 90 DEGREES SUCTION WITH INTEGRATED HANDPIECE: Brand: VAPR COOLPULSE

## (undated) DEVICE — PADDING CAST 4 IN  COTTON STRL

## (undated) DEVICE — GAUZE SPONGES,16 PLY: Brand: CURITY

## (undated) DEVICE — 3M™ IOBAN™ 2 ANTIMICROBIAL INCISE DRAPE 6650EZ: Brand: IOBAN™ 2

## (undated) DEVICE — DISPOSABLE EQUIPMENT COVER: Brand: SMALL TOWEL DRAPE

## (undated) DEVICE — GLOVE INDICATOR PI UNDERGLOVE SZ 7.5 BLUE

## (undated) DEVICE — ADHESIVE SKIN HIGH VISCOSITY EXOFIN 1ML

## (undated) DEVICE — TUBING SUCTION 5MM X 12 FT

## (undated) DEVICE — CULTURE TUBE AEROBIC

## (undated) DEVICE — SPLINT ORTHO-GLASS 3IN X 15FT

## (undated) DEVICE — SPONGE PVP SCRUB WING STERILE

## (undated) DEVICE — PACK PBDS SHOULDER ARTHROSCOPY RF

## (undated) DEVICE — NEEDLE 23G X 1 1/2 SAFETY-GLIDE THIN WALL